# Patient Record
Sex: FEMALE | Race: WHITE | NOT HISPANIC OR LATINO | Employment: PART TIME | URBAN - METROPOLITAN AREA
[De-identification: names, ages, dates, MRNs, and addresses within clinical notes are randomized per-mention and may not be internally consistent; named-entity substitution may affect disease eponyms.]

---

## 2017-01-17 ENCOUNTER — ALLSCRIPTS OFFICE VISIT (OUTPATIENT)
Dept: OTHER | Facility: OTHER | Age: 42
End: 2017-01-17

## 2017-02-13 ENCOUNTER — ALLSCRIPTS OFFICE VISIT (OUTPATIENT)
Dept: OTHER | Facility: OTHER | Age: 42
End: 2017-02-13

## 2017-04-19 ENCOUNTER — ALLSCRIPTS OFFICE VISIT (OUTPATIENT)
Dept: OTHER | Facility: OTHER | Age: 42
End: 2017-04-19

## 2017-08-15 ENCOUNTER — ALLSCRIPTS OFFICE VISIT (OUTPATIENT)
Dept: OTHER | Facility: OTHER | Age: 42
End: 2017-08-15

## 2017-08-29 ENCOUNTER — ALLSCRIPTS OFFICE VISIT (OUTPATIENT)
Dept: OTHER | Facility: OTHER | Age: 42
End: 2017-08-29

## 2017-09-14 ENCOUNTER — TRANSCRIBE ORDERS (OUTPATIENT)
Dept: ADMINISTRATIVE | Facility: HOSPITAL | Age: 42
End: 2017-09-14

## 2017-09-14 DIAGNOSIS — N92.1 METRORRHAGIA: Primary | ICD-10-CM

## 2017-09-20 ENCOUNTER — HOSPITAL ENCOUNTER (OUTPATIENT)
Dept: RADIOLOGY | Facility: HOSPITAL | Age: 42
Discharge: HOME | End: 2017-09-20
Attending: OBSTETRICS & GYNECOLOGY
Payer: COMMERCIAL

## 2017-09-20 DIAGNOSIS — N92.1 METRORRHAGIA: ICD-10-CM

## 2017-09-20 PROCEDURE — 76856 US EXAM PELVIC COMPLETE: CPT

## 2017-09-20 PROCEDURE — 76830 TRANSVAGINAL US NON-OB: CPT

## 2017-09-28 ENCOUNTER — GENERIC CONVERSION - ENCOUNTER (OUTPATIENT)
Dept: OTHER | Facility: OTHER | Age: 42
End: 2017-09-28

## 2017-10-11 RX ORDER — OMEPRAZOLE 20 MG/1
20 CAPSULE, DELAYED RELEASE ORAL AS NEEDED
COMMUNITY
End: 2019-01-02 | Stop reason: SDUPTHER

## 2017-10-11 NOTE — PRE-PROCEDURE INSTRUCTIONS
Pre-Surgery Instructions:   Medication Instructions    MELOXICAM PO Instructed patient per Anesthesia Guidelines   omeprazole (PriLOSEC) 20 mg delayed release capsule Instructed patient per Anesthesia Guidelines  Pre op instructions reviewed with pt via phone  Instructed to take omeprazole (prn) a m of surgery   bunny Johns [phone]  My Surgical Experience    The following information was developed to assist you to prepare for your operation  What do I need to do before coming to the hospital?   Arrange for a responsible person to drive you to and from the hospital    Arrange care for your children at home  Children are not allowed in the recovery areas of the hospital   Plan to wear clothing that is easy to put on and take off  If you are having shoulder surgery, wear a shirt that buttons or zippers in the front  Bathing  o Shower the evening before and the morning of your surgery with an antibacterial soap  Please refer to the Pre Op Showering Instructions for Surgery Patients Sheet   o Remove nail polish and all body piercing jewelry  o Do not shave any body part for at least 24 hours before surgery-this includes face, arms, legs and upper body  Food  o Nothing to eat or drink after midnight the night before your surgery  This includes candy and chewing gum  o Exception: If your surgery is after 12:00pm (noon), you may have clear liquids such as 7-Up®, ginger ale, apple or cranberry juice, Jell-O®, water, or clear broth until 8:00 am  o Do not drink milk or juice with pulp on the morning before surgery  o Do not drink alcohol 24 hours before surgery  Medicine  o Follow instructions you received from your surgeon about which medicines you may take on the day of surgery  o If instructed to take medicine on the morning of surgery, take pills with just a small sip of water   Call your prescribing doctor for specific information on what to do if you take insulin    What should I bring to the hospital?    Bring:  Nonnie Senters or a walker, if you have them, for foot or knee surgery   A list of the daily medicines, vitamins, minerals, herbals and nutritional supplements you take  Include the dosages of medicines and the time you take them each day   Glasses, dentures or hearing aids   Minimal clothing; you will be wearing hospital sleepwear   Photo ID; required to verify your identity   If you have a Living Will or Power of , bring a copy of the documents   If you have an ostomy, bring an extra pouch and any supplies you use    Do not bring   Medicines or inhalers   Money, valuables or jewelry    What other information should I know about the day of surgery?  Notify your surgeons if you develop a cold, sore throat, cough, fever, rash or any other illness   Report to the Ambulatory Surgical/Same Day Surgery Unit   You will be instructed to stop at Registration only if you have not been pre-registered   Inform your  fi they do not stay that they will be asked by the staff to leave a phone number where they can be reached   Be available to be reached before surgery  In the event the operating room schedule changes, you may be asked to come in earlier or later than expected    *It is important to tell your doctor and others involved in your health care if you are taking or have been taking any non-prescription drugs, vitamins, minerals, herbals or other nutritional supplements   Any of these may interact with some food or medicines and cause a reaction

## 2017-10-17 ENCOUNTER — ANESTHESIA EVENT (OUTPATIENT)
Dept: PERIOP | Facility: AMBULARY SURGERY CENTER | Age: 42
End: 2017-10-17
Payer: COMMERCIAL

## 2017-10-17 NOTE — ANESTHESIA PREPROCEDURE EVALUATION
Review of Systems/Medical History  Patient summary reviewed  Chart reviewed  History of anesthetic complications PONV    Cardiovascular   Pulmonary  Smoker ex-smoker , ,        GI/Hepatic    GERD ,             Endo/Other     GYN       Hematology   Musculoskeletal    Comment: Chronic right foot pain      Neurology    Headaches,    Psychology   Anxiety,            Physical Exam    Airway    Mallampati score: II  TM Distance: <3 FB  Neck ROM: full     Dental       Cardiovascular  Rhythm: regular, Rate: normal,     Pulmonary  Breath sounds clear to auscultation,     Other Findings        Anesthesia Plan  ASA Score- 2       Anesthesia Type- general with ASA Monitors  Additional Monitors:   Airway Plan: LMA  Induction- intravenous  Informed Consent- Anesthetic plan and risks discussed with patient  I personally reviewed this patient with the CRNA  Discussed and agreed on the Anesthesia Plan with the CRNA  Shelton Hassan

## 2017-10-18 ENCOUNTER — ANESTHESIA (OUTPATIENT)
Dept: PERIOP | Facility: AMBULARY SURGERY CENTER | Age: 42
End: 2017-10-18
Payer: COMMERCIAL

## 2017-10-18 ENCOUNTER — HOSPITAL ENCOUNTER (OUTPATIENT)
Facility: AMBULARY SURGERY CENTER | Age: 42
Setting detail: OUTPATIENT SURGERY
Discharge: HOME | End: 2017-10-18
Attending: OBSTETRICS & GYNECOLOGY | Admitting: OBSTETRICS & GYNECOLOGY
Payer: COMMERCIAL

## 2017-10-18 VITALS
BODY MASS INDEX: 27.47 KG/M2 | SYSTOLIC BLOOD PRESSURE: 104 MMHG | TEMPERATURE: 97.4 F | DIASTOLIC BLOOD PRESSURE: 64 MMHG | HEIGHT: 67 IN | HEART RATE: 55 BPM | RESPIRATION RATE: 16 BRPM | OXYGEN SATURATION: 98 % | WEIGHT: 175 LBS

## 2017-10-18 DIAGNOSIS — N93.9 ABNORMAL UTERINE AND VAGINAL BLEEDING, UNSPECIFIED (CODE): ICD-10-CM

## 2017-10-18 PROCEDURE — 88305 TISSUE EXAM BY PATHOLOGIST: CPT | Performed by: OBSTETRICS & GYNECOLOGY

## 2017-10-18 RX ORDER — SODIUM CHLORIDE, SODIUM LACTATE, POTASSIUM CHLORIDE, CALCIUM CHLORIDE 600; 310; 30; 20 MG/100ML; MG/100ML; MG/100ML; MG/100ML
125 INJECTION, SOLUTION INTRAVENOUS CONTINUOUS
Status: DISCONTINUED | OUTPATIENT
Start: 2017-10-18 | End: 2017-10-18 | Stop reason: SDUPTHER

## 2017-10-18 RX ORDER — HYDROMORPHONE HCL 110MG/55ML
0.5 PATIENT CONTROLLED ANALGESIA SYRINGE INTRAVENOUS AS NEEDED
Status: DISCONTINUED | OUTPATIENT
Start: 2017-10-18 | End: 2017-10-18 | Stop reason: HOSPADM

## 2017-10-18 RX ORDER — MEPERIDINE HYDROCHLORIDE 25 MG/ML
12.5 INJECTION INTRAMUSCULAR; INTRAVENOUS; SUBCUTANEOUS
Status: DISCONTINUED | OUTPATIENT
Start: 2017-10-18 | End: 2017-10-18 | Stop reason: HOSPADM

## 2017-10-18 RX ORDER — ONDANSETRON 2 MG/ML
INJECTION INTRAMUSCULAR; INTRAVENOUS AS NEEDED
Status: DISCONTINUED | OUTPATIENT
Start: 2017-10-18 | End: 2017-10-18 | Stop reason: SURG

## 2017-10-18 RX ORDER — FENTANYL CITRATE 50 UG/ML
INJECTION, SOLUTION INTRAMUSCULAR; INTRAVENOUS AS NEEDED
Status: DISCONTINUED | OUTPATIENT
Start: 2017-10-18 | End: 2017-10-18 | Stop reason: SURG

## 2017-10-18 RX ORDER — SODIUM CHLORIDE, SODIUM LACTATE, POTASSIUM CHLORIDE, CALCIUM CHLORIDE 600; 310; 30; 20 MG/100ML; MG/100ML; MG/100ML; MG/100ML
125 INJECTION, SOLUTION INTRAVENOUS CONTINUOUS
Status: DISCONTINUED | OUTPATIENT
Start: 2017-10-18 | End: 2017-10-18 | Stop reason: HOSPADM

## 2017-10-18 RX ORDER — SODIUM CHLORIDE, SODIUM LACTATE, POTASSIUM CHLORIDE, CALCIUM CHLORIDE 600; 310; 30; 20 MG/100ML; MG/100ML; MG/100ML; MG/100ML
75 INJECTION, SOLUTION INTRAVENOUS CONTINUOUS
Status: DISCONTINUED | OUTPATIENT
Start: 2017-10-18 | End: 2017-10-18 | Stop reason: SDUPTHER

## 2017-10-18 RX ORDER — MIDAZOLAM HYDROCHLORIDE 1 MG/ML
INJECTION INTRAMUSCULAR; INTRAVENOUS AS NEEDED
Status: DISCONTINUED | OUTPATIENT
Start: 2017-10-18 | End: 2017-10-18 | Stop reason: SURG

## 2017-10-18 RX ORDER — ONDANSETRON 2 MG/ML
4 INJECTION INTRAMUSCULAR; INTRAVENOUS ONCE AS NEEDED
Status: DISCONTINUED | OUTPATIENT
Start: 2017-10-18 | End: 2017-10-18 | Stop reason: HOSPADM

## 2017-10-18 RX ORDER — PROPOFOL 10 MG/ML
INJECTION, EMULSION INTRAVENOUS AS NEEDED
Status: DISCONTINUED | OUTPATIENT
Start: 2017-10-18 | End: 2017-10-18 | Stop reason: SURG

## 2017-10-18 RX ORDER — FENTANYL CITRATE/PF 50 MCG/ML
50 SYRINGE (ML) INJECTION AS NEEDED
Status: DISCONTINUED | OUTPATIENT
Start: 2017-10-18 | End: 2017-10-18 | Stop reason: HOSPADM

## 2017-10-18 RX ORDER — MAGNESIUM HYDROXIDE 1200 MG/15ML
LIQUID ORAL AS NEEDED
Status: DISCONTINUED | OUTPATIENT
Start: 2017-10-18 | End: 2017-10-18 | Stop reason: HOSPADM

## 2017-10-18 RX ORDER — LIDOCAINE HYDROCHLORIDE 10 MG/ML
INJECTION, SOLUTION INFILTRATION; PERINEURAL AS NEEDED
Status: DISCONTINUED | OUTPATIENT
Start: 2017-10-18 | End: 2017-10-18 | Stop reason: SURG

## 2017-10-18 RX ORDER — SCOLOPAMINE TRANSDERMAL SYSTEM 1 MG/1
1 PATCH, EXTENDED RELEASE TRANSDERMAL ONCE
Status: DISCONTINUED | OUTPATIENT
Start: 2017-10-18 | End: 2017-10-18 | Stop reason: HOSPADM

## 2017-10-18 RX ORDER — PROMETHAZINE HYDROCHLORIDE 25 MG/ML
12.5 INJECTION, SOLUTION INTRAMUSCULAR; INTRAVENOUS ONCE AS NEEDED
Status: DISCONTINUED | OUTPATIENT
Start: 2017-10-18 | End: 2017-10-18 | Stop reason: HOSPADM

## 2017-10-18 RX ADMIN — DEXAMETHASONE SODIUM PHOSPHATE 5 MG: 10 INJECTION INTRAMUSCULAR; INTRAVENOUS at 09:38

## 2017-10-18 RX ADMIN — SODIUM CHLORIDE, SODIUM LACTATE, POTASSIUM CHLORIDE, AND CALCIUM CHLORIDE 125 ML/HR: .6; .31; .03; .02 INJECTION, SOLUTION INTRAVENOUS at 08:45

## 2017-10-18 RX ADMIN — MIDAZOLAM HYDROCHLORIDE 2 MG: 1 INJECTION, SOLUTION INTRAMUSCULAR; INTRAVENOUS at 09:22

## 2017-10-18 RX ADMIN — FENTANYL CITRATE 50 MCG: 50 INJECTION, SOLUTION INTRAMUSCULAR; INTRAVENOUS at 09:22

## 2017-10-18 RX ADMIN — SODIUM CHLORIDE, SODIUM LACTATE, POTASSIUM CHLORIDE, AND CALCIUM CHLORIDE: .6; .31; .03; .02 INJECTION, SOLUTION INTRAVENOUS at 09:20

## 2017-10-18 RX ADMIN — ONDANSETRON 4 MG: 2 INJECTION INTRAMUSCULAR; INTRAVENOUS at 09:38

## 2017-10-18 RX ADMIN — LIDOCAINE HYDROCHLORIDE 50 MG: 10 INJECTION, SOLUTION INFILTRATION; PERINEURAL at 09:25

## 2017-10-18 RX ADMIN — PROPOFOL 200 MG: 10 INJECTION, EMULSION INTRAVENOUS at 09:25

## 2017-10-18 NOTE — ANESTHESIA POSTPROCEDURE EVALUATION
Post-Op Assessment Note      CV Status:  Stable    Mental Status:  Awake    Hydration Status:  Stable    PONV Controlled:  None    Airway Patency:  Patent    Post Op Vitals Reviewed: Yes          Staff: Anesthesiologist           /67 (10/18/17 1001)    Temp      Pulse (!) 52 (10/18/17 1001)   Resp      SpO2 100 % (10/18/17 1001)

## 2017-10-18 NOTE — OP NOTE
OPERATIVE REPORT  PATIENT NAME: Rocio Johnson    :  1975  MRN: [id number]  Pt Location: [facility] OR ROOM 01    SURGERY DATE: 10/18/2017    Surgeon(s) and Role:     * Norris Ahumada, MD - Primary    Preop Diagnosis:  Abnormal uterine and vaginal bleeding, unspecified (CODE) [N93 9]    Post-Op Diagnosis Codes:     * Abnormal uterine and vaginal bleeding, unspecified (CODE) [N93 9]    Procedure(s) (LRB):  DILATATION AND CURETTAGE (D&C) WITH HYSTEROSCOPY, POSS  POLYPECTOMY (N/A)    Specimen(s):  ID Type Source Tests Collected by Time Destination   1 : Endo cervical curretings Tissue Endocervical TISSUE EXAM Norris Ahumada, MD 10/18/2017 8450    2 : Endometrial curretings Tissue Endometrium TISSUE EXAM Norris Ahumada, MD 10/18/2017 9444        Estimated Blood Loss:   Minimal    Drains:       Anesthesia Type:   General    Operative Indications:  Abnormal uterine and vaginal bleeding, unspecified (CODE) [N93 9]  Endometrial hyperplasia on recent ultrasound; possible endometrial polyp    Operative Findings:  Normal-appearing endometrial cavity; no endometrial polyp seen    Complications:   None    Procedure and Technique:  The patient was brought into the operating room placed under general anesthesia in lithotomy position prepped and draped in usual manner  A weighted speculum was placed in the posterior fornix of the vagina, straight speculum was used to raise the anterior aspect of the vagina revealing the cervix  This was grasped on its anterior lip and brought into longitudinal axis with the vagina  The cervix was slightly dilated using West dilators after rigid diagnostic hysteroscope was placed through the cervical canal into the cavity of the uterus  The cavity was well distended with glycine and there was good visualization of the endometrial cavity    There were no submucosal polyps or fibroids noted and the cavity looked completely normal   T Once the hysteroscope was removed, endocervical curettings were obtained after which a endometrial curette marked to the depth of the endometrial cavity as noted with preoperative ultrasound was passed into the cavity and a thorough but gentle curettage of the endometrium ensued  There was no active bleeding and no damage or puncture of the uterus during the case    At the end of the procedure bleeding was normal   The patient was clean the anesthesia was reversed and the patient was transferred to PACU in good condition   I was present for the entire procedure    Patient Disposition:  PACU     SIGNATURE: Lakeshia Azul MD  DATE: October 18, 2017  TIME: 9:45 AM

## 2017-10-18 NOTE — DISCHARGE INSTRUCTIONS
DISCHARGE INSTRUCTIONS  DR GARCIA    · No driving or operating any heavy equipment for at least 24 hours  · Resume normal diet:  Start light including liquids, toast, jello, soup etc   · Resume regular medications unless otherwise directed by you physician    If you have an abdominal incision:  · You may shower in the morning following surgery  · Call Dr Summer Varner for any redness, foul smelling discharge, fever of over 100 4 or any signs of infection      · Nothing in vagina for 5-7 days  No sex, douching or tampons  · Call physician for excessive bleeding, soaking though pads, or passing large clots     · Make an appointment to see MD in 2 weeks    Call Dr Summer Varner at [phone] for any problems or questions

## 2017-12-22 ENCOUNTER — ALLSCRIPTS OFFICE VISIT (OUTPATIENT)
Dept: OTHER | Facility: OTHER | Age: 42
End: 2017-12-22

## 2017-12-23 NOTE — PROGRESS NOTES
Assessment   1  Acute bacterial sinusitis (461 9) (J01 90,B96 89)    Plan   Acute bacterial sinusitis    · Amoxicillin-Pot Clavulanate 875-125 MG Oral Tablet; TAKE 1 TABLET EVERY 12    HOURS DAILY   · Promethazine-DM 6 25-15 MG/5ML Oral Syrup; TAKE 5 - 10 ML BY MOUTH EVERY    6 HOURS AS NEEDED    Discussion/Summary      Rto prn  Possible side effects of new medications were reviewed with the patient/guardian today  The treatment plan was reviewed with the patient/guardian  The patient/guardian understands and agrees with the treatment plan      Chief Complaint   pt  c/o sinus pain, swollen glands, left ear pain  ck/lpn      History of Present Illness   HPI: 43 y o f seen for sinus pressure, cough with colored mucus in am, cannot sleep b/o cough, no SOB, + L earache, no therapy tried      Review of Systems        Constitutional: No fever, no chills, feels well, no tiredness, no recent weight gain or loss  ENT: sore throat,-- nasal discharge-- and-- hoarseness, but-- as noted in HPI  Respiratory: cough, but-- no shortness of breath,-- no wheezing-- and-- no shortness of breath during exertion  Active Problems   1  Common migraine without aura (346 10) (G43 009)   2  Generalized anxiety disorder (300 02) (F41 1)   3  Screening for colorectal cancer (V76 51) (Z12 11,Z12 12)    Family History   Family History    1  Family history of Arthritis (V17 7)   2  Family history of Colon Cancer (V16 0)   3  Family history of Lung Cancer (V16 1)   4  Family history of Uterine Cancer (V16 49)    Social History    · Alcohol Use (History)   · Daily Cola Consumption (___ Cans/Day)   · Daily Tea Consumption (___ Cups/Day)   · Former smoker (A87 13) (F90 136)    Surgical History   1  History of Oral Surgery Tooth Extraction   2  History of Pilonidal Cyst Resection - Simple    Current Meds    1  FLUoxetine HCl - 20 MG Oral Capsule; TAKE 1 CAPSULE DAILY;      Therapy: 38DEF1479 to (Evaluate:11Nov2016)  Requested for: 79VIF5810; Last     Rx:07Miy2784 Ordered   2  Meloxicam 15 MG Oral Tablet; one tab po qd with food; Therapy: 17KET5854 to (Last Gissel Rodriguez)  Requested for: 04MNT2323 Ordered   3  Omeprazole 40 MG Oral Capsule Delayed Release; take 1 capsule by mouth once daily; Therapy: 99CZS6128 to (Evaluate:64Wfu3022)  Requested for: 00ZVO8358; Last     Rx:40Acz9163 Ordered   4  SUMAtriptan Succinate 100 MG Oral Tablet; take one for migraine; repeat in 2 hours if     needed; not to take more than 2 in 24 hours; Therapy: 94AMF5887 to (Last Rx:92Lof6144)  Requested for: 36Shw5883 Ordered     The medication list was reviewed and updated today  Allergies   1  No Known Drug Allergies    Vitals    Recorded: 22Dec2017 01:30PM   Temperature 98 8 F, Tympanic   Heart Rate 72, L Radial   Pulse Quality Normal, L Radial   Respiration Quality Normal   Respiration 14   Systolic 458, LUE, Sitting   Diastolic 70, LUE, Sitting   Height 5 ft 5 in   Weight 175 lb    BMI Calculated 29 12   BSA Calculated 1 87     Physical Exam        Constitutional      General appearance: No acute distress, well appearing and well nourished  Ears, Nose, Mouth, and Throat      Otoscopic examination: Tympanic membranes translucent with normal light reflex  Canals patent without erythema  Nasal mucosa, septum, and turbinates: Abnormal   The bilateral nasal mucosa was boggy-- and-- edematous  -- b/l maxillary sinuses tenderness  Oropharynx: Normal with no erythema, edema, exudate or lesions  Pulmonary      Respiratory effort: No increased work of breathing or signs of respiratory distress  Auscultation of lungs: Clear to auscultation  Results/Data   PHQ-2 Adult Depression Screening 22Dec2017 01:39PM User, s      Test Name Result Flag Reference   PHQ-2 Adult Depression Score 0     Over the last two weeks, how often have you been bothered by any of the following problems?      Little interest or pleasure in doing things: Not at all - 0     Feeling down, depressed, or hopeless: Not at all - 0   PHQ-2 Adult Depression Screening Negative          Signatures    Electronically signed by : GIAN Connors ; Dec 22 2017  1:49PM EST                       (Author)

## 2018-01-09 ENCOUNTER — ALLSCRIPTS OFFICE VISIT (OUTPATIENT)
Dept: OTHER | Facility: OTHER | Age: 43
End: 2018-01-09

## 2018-01-09 DIAGNOSIS — Z12.31 ENCOUNTER FOR SCREENING MAMMOGRAM FOR MALIGNANT NEOPLASM OF BREAST: ICD-10-CM

## 2018-01-10 NOTE — PROGRESS NOTES
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)   2  Common migraine without aura (346 10) (G43 009)   3  Encounter for PPD test (V74 1) (Z11 1)   4  Generalized anxiety disorder (300 02) (F41 1)   5  Ingrowing nail (703 0) (L60 0)   6  Left maxillary sinusitis (473 0) (J32 0)   7  Paronychia of toe (681 11) (L03 039)   8  Urinary frequency (788 41) (R35 0)   9  Vaginal candidiasis (112 1) (B37 3)    Current Meds   1  Amoxicillin-Pot Clavulanate 875-125 MG Oral Tablet; take one tablet twice daily for 10   days; Therapy: 73GPC8814 to (Evaluate:93Idp1835)  Requested for: 56CLA2000; Last   Rx:30Nov2016 Ordered   2  Fluconazole 150 MG Oral Tablet; TAKE 1 TABLET NOW AND AGAIN IN 3 DAYS; Therapy: 12XXY0066 to (Last Rx:30Nov2016)  Requested for: 85HPD7073 Ordered   3  FLUoxetine HCl - 20 MG Oral Capsule; TAKE 1 CAPSULE DAILY; Therapy: 62MYA3614 to (Evaluate:11Nov2016)  Requested for: 79BTM3911; Last   Rx:65Fmr8483 Ordered   4  Fluticasone Propionate 50 MCG/ACT Nasal Suspension; USE TWO SPRAYS IN EACH   NOSTRIL ONCE DAILY; Therapy: 72NNF6045 to (Last Rx:30Nov2016)  Requested for: 20DWC7264 Ordered   5  SUMAtriptan Succinate 100 MG Oral Tablet; take one for migraine; repeat in 2 hours if   needed; not to take more than 2 in 24 hours; Therapy: 31VAH5846 to (Last Rx:29Mmy2922)  Requested for: 68Hpz7246 Ordered    Allergies    1   No Known Drug Allergies    Plan  Encounter for PPD test    · PPD    Future Appointments    Date/Time Provider Specialty Site   12/23/2016 10:30 AM [facility] FP, Nurse Schedule  [facility]     Signatures   Electronically signed by : GIAN Zhao ; Dec 21 2016 11:58AM EST                       (Author)

## 2018-01-11 ENCOUNTER — GENERIC CONVERSION - ENCOUNTER (OUTPATIENT)
Dept: OTHER | Facility: OTHER | Age: 43
End: 2018-01-11

## 2018-01-11 LAB
A/G RATIO (HISTORICAL): 1.7 (ref 1.2–2.2)
ALBUMIN SERPL BCP-MCNC: 4.3 G/DL (ref 3.5–5.5)
ALP SERPL-CCNC: 86 IU/L (ref 39–117)
ALT SERPL W P-5'-P-CCNC: 8 IU/L (ref 0–32)
AST SERPL W P-5'-P-CCNC: 24 IU/L (ref 0–40)
BILIRUB SERPL-MCNC: 0.4 MG/DL (ref 0–1.2)
BUN SERPL-MCNC: 11 MG/DL (ref 6–24)
BUN/CREA RATIO (HISTORICAL): 13 (ref 9–23)
CALCIUM SERPL-MCNC: 9.1 MG/DL (ref 8.7–10.2)
CHLORIDE SERPL-SCNC: 100 MMOL/L (ref 96–106)
CHOLEST SERPL-MCNC: 172 MG/DL (ref 100–199)
CHOLEST/HDLC SERPL: 2.2 RATIO UNITS (ref 0–4.4)
CO2 SERPL-SCNC: 28 MMOL/L (ref 18–29)
CREAT SERPL-MCNC: 0.84 MG/DL (ref 0.57–1)
EGFR AFRICAN AMERICAN (HISTORICAL): 99 ML/MIN/1.73
EGFR-AMERICAN CALC (HISTORICAL): 86 ML/MIN/1.73
GLUCOSE SERPL-MCNC: 79 MG/DL (ref 65–99)
HDLC SERPL-MCNC: 79 MG/DL
INTERPRETATION (HISTORICAL): NORMAL
LDLC SERPL CALC-MCNC: 80 MG/DL (ref 0–99)
POTASSIUM SERPL-SCNC: 4 MMOL/L (ref 3.5–5.2)
SODIUM SERPL-SCNC: 142 MMOL/L (ref 134–144)
TOT. GLOBULIN, SERUM (HISTORICAL): 2.6 G/DL (ref 1.5–4.5)
TOTAL PROTEIN (HISTORICAL): 6.9 G/DL (ref 6–8.5)
TRIGL SERPL-MCNC: 66 MG/DL (ref 0–149)
VLDLC SERPL CALC-MCNC: 13 MG/DL (ref 5–40)

## 2018-01-12 ENCOUNTER — GENERIC CONVERSION - ENCOUNTER (OUTPATIENT)
Dept: OTHER | Facility: OTHER | Age: 43
End: 2018-01-12

## 2018-01-12 ENCOUNTER — ALLSCRIPTS OFFICE VISIT (OUTPATIENT)
Dept: OTHER | Facility: OTHER | Age: 43
End: 2018-01-12

## 2018-01-12 VITALS
WEIGHT: 175 LBS | SYSTOLIC BLOOD PRESSURE: 120 MMHG | HEART RATE: 80 BPM | DIASTOLIC BLOOD PRESSURE: 70 MMHG | BODY MASS INDEX: 29.16 KG/M2 | HEIGHT: 65 IN | TEMPERATURE: 97.5 F | RESPIRATION RATE: 18 BRPM

## 2018-01-12 LAB
DEPRECATED RDW RBC AUTO: 13.6 % (ref 12.3–15.4)
HCT VFR BLD AUTO: 40.2 % (ref 34–46.6)
HGB BLD-MCNC: 13 G/DL (ref 11.1–15.9)
MCH RBC QN AUTO: 28.6 PG (ref 26.6–33)
MCHC RBC AUTO-ENTMCNC: 32.3 G/DL (ref 31.5–35.7)
MCV RBC AUTO: 89 FL (ref 79–97)
PLATELET # BLD AUTO: 331 X10E3/UL (ref 150–379)
RBC (HISTORICAL): 4.54 X10E6/UL (ref 3.77–5.28)
TSH SERPL DL<=0.05 MIU/L-ACNC: 0.46 UIU/ML (ref 0.45–4.5)
WBC # BLD AUTO: 5.3 X10E3/UL (ref 3.4–10.8)

## 2018-01-13 VITALS
BODY MASS INDEX: 28.82 KG/M2 | SYSTOLIC BLOOD PRESSURE: 124 MMHG | RESPIRATION RATE: 18 BRPM | DIASTOLIC BLOOD PRESSURE: 70 MMHG | HEIGHT: 65 IN | HEART RATE: 80 BPM | WEIGHT: 173 LBS | TEMPERATURE: 98 F

## 2018-01-13 VITALS
WEIGHT: 175 LBS | BODY MASS INDEX: 29.16 KG/M2 | HEART RATE: 70 BPM | DIASTOLIC BLOOD PRESSURE: 73 MMHG | RESPIRATION RATE: 16 BRPM | SYSTOLIC BLOOD PRESSURE: 110 MMHG | HEIGHT: 65 IN

## 2018-01-14 VITALS
DIASTOLIC BLOOD PRESSURE: 65 MMHG | HEART RATE: 65 BPM | WEIGHT: 175 LBS | RESPIRATION RATE: 16 BRPM | SYSTOLIC BLOOD PRESSURE: 104 MMHG | HEIGHT: 65 IN | BODY MASS INDEX: 29.16 KG/M2

## 2018-01-14 VITALS
DIASTOLIC BLOOD PRESSURE: 80 MMHG | TEMPERATURE: 98.6 F | HEART RATE: 84 BPM | BODY MASS INDEX: 29.16 KG/M2 | SYSTOLIC BLOOD PRESSURE: 100 MMHG | WEIGHT: 175 LBS | RESPIRATION RATE: 18 BRPM | HEIGHT: 65 IN

## 2018-01-16 NOTE — PROGRESS NOTES
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)   2  Common migraine without aura (346 10) (G43 009)   3  Encounter for PPD test (V74 1) (Z11 1)   4  Generalized anxiety disorder (300 02) (F41 1)   5  Ingrowing nail (703 0) (L60 0)   6  Left maxillary sinusitis (473 0) (J32 0)   7  Paronychia of toe (681 11) (L03 039)   8  Urinary frequency (788 41) (R35 0)   9  Vaginal candidiasis (112 1) (B37 3)    Current Meds   1  Amoxicillin-Pot Clavulanate 875-125 MG Oral Tablet; take one tablet twice daily for 10   days; Therapy: 88ITS4341 to (Evaluate:13Dvp1503)  Requested for: 05NOD2873; Last   Rx:30Nov2016 Ordered   2  Fluconazole 150 MG Oral Tablet; TAKE 1 TABLET NOW AND AGAIN IN 3 DAYS; Therapy: 65WPX7224 to (Last Rx:30Nov2016)  Requested for: 25QUO9583 Ordered   3  FLUoxetine HCl - 20 MG Oral Capsule; TAKE 1 CAPSULE DAILY; Therapy: 86FMU4824 to (Evaluate:11Nov2016)  Requested for: 07MNF7878; Last   Rx:63Ubx4513 Ordered   4  Fluticasone Propionate 50 MCG/ACT Nasal Suspension; USE TWO SPRAYS IN EACH   NOSTRIL ONCE DAILY; Therapy: 44RPS8114 to (Last Rx:30Nov2016)  Requested for: 81RNW7873 Ordered   5  SUMAtriptan Succinate 100 MG Oral Tablet; take one for migraine; repeat in 2 hours if   needed; not to take more than 2 in 24 hours; Therapy: 25CAS2206 to (Last Rx:41Ego4003)  Requested for: 86Bmi2413 Ordered    Allergies    1   No Known Drug Allergies    Plan  Encounter for PPD test    · PPD    Signatures   Electronically signed by : GIAN Jarvis ; Dec 23 2016 12:11PM EST                       (Author)

## 2018-01-22 VITALS
BODY MASS INDEX: 29.16 KG/M2 | DIASTOLIC BLOOD PRESSURE: 70 MMHG | HEIGHT: 65 IN | SYSTOLIC BLOOD PRESSURE: 110 MMHG | HEART RATE: 72 BPM | TEMPERATURE: 98.8 F | RESPIRATION RATE: 14 BRPM | WEIGHT: 175 LBS

## 2018-01-22 VITALS
WEIGHT: 175 LBS | SYSTOLIC BLOOD PRESSURE: 98 MMHG | RESPIRATION RATE: 16 BRPM | DIASTOLIC BLOOD PRESSURE: 56 MMHG | BODY MASS INDEX: 29.16 KG/M2 | HEIGHT: 65 IN | HEART RATE: 68 BPM

## 2018-01-23 VITALS
RESPIRATION RATE: 16 BRPM | HEIGHT: 65 IN | SYSTOLIC BLOOD PRESSURE: 100 MMHG | TEMPERATURE: 97.7 F | DIASTOLIC BLOOD PRESSURE: 60 MMHG | HEART RATE: 76 BPM | BODY MASS INDEX: 29.49 KG/M2 | WEIGHT: 177 LBS

## 2018-01-23 NOTE — PROGRESS NOTES
Assessment    1  Encounter for preventive health examination (V70 0) (Z00 00)   2  Acute bacterial sinusitis (461 9) (J01 90,B96 89)   3  Cough (786 2) (R05)   4  Encounter for screening mammogram for breast cancer (V76 12) (Z12 31)   5  Encounter for PPD test (V74 1) (Z11 1)   6  Encounter for screening for diabetes mellitus (V77 1) (Z13 1)   7  History of dysfunctional uterine bleeding (V13 29) (Z87 42)   8  Thyroid disorder screen (V77 0) (Z13 29)   9  Need for lipid screening (V77 91) (Z13 220)   10  BMI 29 0-29 9,adult (V85 25) (Z68 29)    Plan  Acute bacterial sinusitis    · Fluticasone Propionate 50 MCG/ACT Nasal Suspension; Two sprays in each nostril  once daily   · Moxifloxacin HCl - 400 MG Oral Tablet; take one tablet daily   · Apply warm moist compresses to the affected area 3 times a day for 5 minutes ;  Status:Complete;   Done: 16LYD3071   · Drink at least 6 glasses of water or juice a day ; Status:Complete;   Done: 61ULE3900   · How to use a nasal spray ; Status:Complete;   Done: 26ULZ5681   · Taking a hot steamy shower may help your condition ; Status:Complete;   Done:  60SWF2826   · Call [phone] if: The symptoms are not better in 7 days ; Status:Complete;   Done:  60LTB3453   · Call [phone] if: The symptoms come back after the medications are finished ;  Status:Complete;   Done: 92KJR5059  Acute bacterial sinusitis, Common migraine without aura, Cough, Health Maintenance    · (1) CBC/ PLT (NO DIFF); Status:Active; Requested [id number];    · (1) COMPREHENSIVE METABOLIC PANEL; Status:Active;  Requested YXY:[id number];   Common migraine without aura    · SUMAtriptan Succinate 100 MG Oral Tablet (Imitrex); take one for migraine;  repeat in 2 hours if needed; not to take more than 2 in 24  hours  Cough    · Benzonatate 200 MG Oral Capsule; TAKE 1 CAPSULE 3 TIMES DAILY AS  NEEDED   · Promethazine-Codeine 6 25-10 MG/5ML Oral Syrup; TAKE 5 ML BY MOUTH AT  BEDTIME AS NEEDED  Encounter for PPD test    · PPD; INJECT 0 1  ML Intradermal; To Be Done: 82BLZ7558  Encounter for screening mammogram for breast cancer    · * MAMMO SCREENING BILATERAL W CAD; Status:Hold For - Scheduling; Requested  for:09Jan2018;   Generalized anxiety disorder, Thyroid disorder screen    · (1) TSH WITH FT4 REFLEX; Status:Active; Requested [id number]; Health Maintenance    · Follow-up visit in 1 year Evaluation and Treatment  Follow-up  Status: Hold For -  Scheduling  Requested for: 85MQX8373   · Begin or continue regular aerobic exercise  Gradually work up to at least 3 sessions of 30  minutes of exercise a week ; Status:Complete;   Done: 78ZTF3018   · Drink plenty of fluids ; Status:Complete;   Done: 87TSX5379   · Eat a low fat and low cholesterol diet ; Status:Complete;   Done: 20YGY3347   · There are ways to decrease your stress and improve your sense of well-being  We  encourage you to keep active and exercise regularly  Make time to take care of yourself  and participate in activities that you enjoy  Stay connected to friends and family that can  support and comfort you  If at any time you have thoughts of harming yourself or  someone else, contact us immediately ; Status:Active; Requested [id number]:[id number];    · Call [phone] if: You have any warning signs of skin cancer ; Status:Complete;    Done: 21QQT2428  Health Maintenance, Need for lipid screening    · (1) LIPID PANEL, FASTING; Status:Active; Requested [id number]:[id number]; Discussion/Summary  health maintenance visit Currently, she eats an adequate diet and has an inadequate exercise regimen  the risks and benefits of cervical cancer screening were discussed cervical cancer screening is managed by Dr Jasmeet Gerard Breast cancer screening: the risks and benefits of breast cancer screening were discussed, monthly self breast exam was advised and mammogram has been ordered  Screening lab work includes hemoglobin, glucose, lipid profile and thyroid function testing  The risks and benefits of immunizations were discussed and patient declines immunizations  Advice and education were given regarding nutrition and weight bearing exercise  Patient discussion: discussed with the patient  The patient was counseled regarding instructions for management, risk factor reductions, prognosis, impressions, risks and benefits of treatment options, importance of compliance with treatment  Possible side effects of new medications were reviewed with the patient/guardian today  The treatment plan was reviewed with the patient/guardian  The patient/guardian understands and agrees with the treatment plan      Chief Complaint  pt presents for annual PE  paps w/ Dr Sofiya Ng  Pt needs refills today  pt has regular eye exams w/ [facility]  rh/cma      History of Present Illness  HM, Adult Female: The patient is being seen for a health maintenance evaluation  The last health maintenance visit was unknown  General Health: The patient's health since the last visit is described as fair  She has regular dental visits  She denies vision problems  She denies hearing loss  Immunizations status: not up to date  Lifestyle:  She consumes a diverse and healthy diet  She does not exercise regularly  She does not use tobacco  She denies alcohol use  Reproductive health: the patient is perimenopausal   she reports abnormal menses  see hpi  Screening: Cervical cancer screening includes uncertain timing of her last pap smear  Breast cancer screening includes uncertain timing of her last mammogram  She hasn't been previously screened for colorectal cancer  Metabolic screening includes uncertain timing of her last lipid profile, uncertain timing of her last glucose screening and uncertain timing of her last thyroid function test      Cardiovascular risk factors: stress and obesity, but no diabetes  General health risks: no abnormal cervical cytology     Safety elements used: seat belt and safe driving habits  Risk assessments performed include depression symptoms  Risk findings: no depression symptoms  HPI: Sees Dr Ottoniel Augustin for GYN   s/p D & C with uterine polyp removal for DUB 10/18/17    Sinusitis (Brief): The patient is being seen for an initial evaluation of sinusitis  The sinusitis involves the maxillary sinuses  The sinusitis is classified as acute recurrent  The patient is currently experiencing symptoms  facial pressure headache nasal congestion purulent rhinorrhea postnasal drainage The facial pain is located in the retro-orbital area bilaterally and maxillary teeth bilaterally  Nasal discharge is described as yellow and brown  Onset was gradual 2 week(s) ago and did not get better from 12/22 ov  The symptoms occur constantly  She describes this as moderate in severity and worsening  Exacerbating factors:  lying down  No relieving factors are noted  Associated symptoms:  fever, ear pain and cough  Current treatment includes increased fluid intake and saline nasal drops  By report, there is poor symptom control  Pertinent medical history:  1 week postpartum  No risk factors have been identified  Exposure history:  upper respiratory tract infection  Review of Systems    Constitutional: as noted in HPI  Eyes: No complaints of eye pain, no red eyes, no eyesight problems, no discharge, no dry eyes, no itching of eyes  ENT: as noted in HPI  Cardiovascular: No complaints of slow heart rate, no fast heart rate, no chest pain, no palpitations, no leg claudication, no lower extremity edema  Respiratory: cough, but no shortness of breath and no wheezing  Gastrointestinal: No complaints of abdominal pain, no constipation, no nausea or vomiting, no diarrhea, no bloody stools  Genitourinary: No complaints of dysuria, no incontinence, no pelvic pain, no dysmenorrhea, no vaginal discharge or bleeding     Musculoskeletal: No complaints of arthralgias, no myalgias, no joint swelling or stiffness, no limb pain or swelling  Integumentary: no rashes  Neurological: headache, but no dizziness  Psychiatric: Not suicidal, no sleep disturbance, no anxiety or depression, no change in personality, no emotional problems  Active Problems    1  Acute bacterial sinusitis (461 9) (J01 90,B96 89)   2  Common migraine without aura (346 10) (G43 009)   3  Generalized anxiety disorder (300 02) (F41 1)   4   Screening for colorectal cancer (V76 51) (Z12 11,Z12 12)    Past Medical History    · History of Abdominal pain, LLQ (left lower quadrant) (789 04) (R10 32)   · History of Acquired ankle/foot deformity (736 70) (M21 969)   · History of Acute bacterial bronchitis (466 0,041 9) (J20 8,B96 89)   · History of Acute Bronchitis With Bronchospasm (466 0)   · History of Acute upper respiratory infection (465 9) (J06 9)   · History of Dermatomycosis (111 9) (B36 9)   · History of acute bacterial sinusitis (V12 69) (Z87 09)   · History of acute bacterial sinusitis (V12 69) (Z87 09)   · History of acute sinusitis (V12 69) (Z87 09)   · History of dysfunctional uterine bleeding (V13 29) (Z87 42)   · History of ingrown nail (V13 3) (Z87 2)   · History of ingrown nail (V13 3) (Z87 2)   · History of pharyngitis (V12 69) (Z87 09)   · History of trichomoniasis (V12 09) (Z86 19)   · History of urinary frequency (V13 09) (Z87 898)   · History of vaginitis (V13 29) (Z87 42)   · History of viral gastroenteritis (V12 09) (Z86 19)   · History of Ingrown nail (703 0) (L60 0)   · History of Joint pain, knee (719 46) (M25 569)   · History of Left maxillary sinusitis (473 0) (J32 0)   · History of Limb pain (729 5) (M79 609)   · History of Lower back pain (724 2) (M54 5)   · History of Mass of knee (719 66) (M25 869)   · History of Paronychia (681 9)   · History of Paronychia of toe (681 11) (L03 039)   · History of Paronychia of toe of right foot (681 11) (L03 031)   · History of Paronychia of toe, unspecified laterality (681 11) (L03 039)   · History of Patellofemoral Dysfunction (736 6)   · History of Patellofemoral stress syndrome, unspecified laterality (719 46) (M22 2X9)   · History of Pes planus, congenital (754 61) (Q66 50)   · History of Plantar fibromatosis (728 71) (M72 2)   · History of Polycystic ovarian syndrome (256 4) (E28 2)   · History of Right foot pain (729 5) (M79 671)   · History of Right foot pain (729 5) (M79 671)   · History of Symptoms involving urinary system (788 99) (R39 9)   · History of Twin pregnancy (651 00,V91 00) (O30 009)   · History of Urinary frequency (788 41) (R35 0)   · History of Urinary Tract Infection (V13 02)   · History of UTI (urinary tract infection) (599 0) (N39 0)   · History of Vaginal candidiasis (112 1) (B37 3)   · History of Varicose Veins Of Lower Extremities (454 9)   · History of Vertigo (780 4) (R42)   · History of Wrist Sprain (842 00)   · History of Yeast infection (112 9) (B37 9)    The active problems and past medical history were reviewed and updated today  Surgical History    · History of Oral Surgery Tooth Extraction   · History of Pilonidal Cyst Resection - Simple    The surgical history was reviewed and updated today  Family History  Family History    · Family history of Arthritis (V17 7)   · Family history of Colon Cancer (V16 0)   · Family history of Lung Cancer (V16 1)   · Family history of Uterine Cancer (V16 49)    The family history was reviewed and updated today  Social History    · Alcohol Use (History)   · Daily Cola Consumption (___ Cans/Day)   · Daily Tea Consumption (___ Cups/Day)   · Former smoker (F83 02) (S91 709)  The social history was reviewed and updated today  Current Meds   1  Omeprazole 40 MG Oral Capsule Delayed Release; take 1 capsule by mouth once daily; Therapy: 25KCP1254 to (Evaluate:42Ibl1191)  Requested for: 43Pgc9261; Last   Rx:92Ebm6546 Ordered   2   SUMAtriptan Succinate 100 MG Oral Tablet; take one for migraine; repeat in 2 hours if   needed; not to take more than 2 in 24 hours; Therapy: 24HCI5959 to (Last Rx:19Apr2017)  Requested for: 19Apr2017 Ordered    Allergies    1  No Known Drug Allergies    Vitals   Recorded: 59DWC6170 01:09PM   Temperature 97 7 F, Temporal   Heart Rate 76, L Radial   Pulse Quality Normal, L Radial   Respiration Quality Normal   Respiration 16   Systolic 670, LUE, Sitting   Diastolic 60, LUE, Sitting   Height 5 ft 5 in   Weight 177 lb    BMI Calculated 29 45   BSA Calculated 1 88     Physical Exam    Constitutional   General appearance: Abnormal   acutely ill and well hydrated  Eyes   Conjunctiva and lids: Abnormal   injected  Pupils and irises: Equal, round and reactive to light  Ears, Nose, Mouth, and Throat   External inspection of ears and nose: Normal     Otoscopic examination: Abnormal   TMs dull  Nasal mucosa, septum, and turbinates: Abnormal   There was a purulent discharge from both nares  The bilateral nasal mucosa was edematous and red  Oropharynx: Abnormal   The posterior pharynx was erythematous  Pulmonary   Respiratory effort: No increased work of breathing or signs of respiratory distress  Auscultation of lungs: Clear to auscultation  Cardiovascular   Auscultation of heart: Normal rate and rhythm, normal S1 and S2, without murmurs  Examination of extremities for edema and/or varicosities: Normal     Lymphatic   Palpation of lymph nodes in neck: Abnormal   bilateral anterior cervical node enlargement  Musculoskeletal   Gait and station: Normal     Skin   Skin and subcutaneous tissue: Normal without rashes or lesions  Psychiatric   Mood and affect: Normal       Head and Face: Maxillary sinuses: tender to palpation on the left        Future Appointments    Date/Time Provider Specialty Site   01/12/2018 11:30 AM [facility] FP, Nurse Schedule  [facility]     Signatures   Electronically signed by : MICHAEL Cummings; Jan 9 2018 1:45PM EST                       (Author)    Electronically signed by : GIAN Ventura ; Jan 9 2018  1:45PM EST                       (Author)

## 2018-02-06 ENCOUNTER — OFFICE VISIT (OUTPATIENT)
Dept: FAMILY MEDICINE CLINIC | Facility: CLINIC | Age: 43
End: 2018-02-06
Payer: COMMERCIAL

## 2018-02-06 VITALS
HEART RATE: 78 BPM | HEIGHT: 65 IN | SYSTOLIC BLOOD PRESSURE: 92 MMHG | BODY MASS INDEX: 29.49 KG/M2 | DIASTOLIC BLOOD PRESSURE: 60 MMHG | TEMPERATURE: 97.1 F | RESPIRATION RATE: 12 BRPM | WEIGHT: 177 LBS

## 2018-02-06 DIAGNOSIS — M25.50 ARTHRALGIA, UNSPECIFIED JOINT: Primary | ICD-10-CM

## 2018-02-06 PROCEDURE — 99214 OFFICE O/P EST MOD 30 MIN: CPT | Performed by: FAMILY MEDICINE

## 2018-02-06 NOTE — PROGRESS NOTES
Chief Complaint   Patient presents with    Joint Swelling     pt  c/o joint pain x2 years on and off        Patient ID: Scar Interiano is a 43 y o  female  HPI    Pt is seeing for intermittent multiple joints pain started 1 y ago -  Worsening last wk -  Was taking NSAIDs with help -  Symptoms resolved by now, no prior w/u     The following portions of the patient's history were reviewed and updated as appropriate: allergies, current medications, past family history, past medical history, past social history, past surgical history and problem list     Review of Systems   Constitutional: Negative  Respiratory: Negative  Cardiovascular: Negative  Gastrointestinal: Negative  Genitourinary: Negative  Musculoskeletal: Negative  Skin: Negative  Neurological: Negative  Current Outpatient Prescriptions   Medication Sig Dispense Refill    omeprazole (PriLOSEC) 20 mg delayed release capsule Take 20 mg by mouth as needed       No current facility-administered medications for this visit  Objective:    BP 92/60 (BP Location: Right arm, Patient Position: Sitting, Cuff Size: Adult)   Pulse 78   Temp (!) 97 1 °F (36 2 °C) (Temporal)   Resp 12   Ht 5' 5" (1 651 m)   Wt 80 3 kg (177 lb)   BMI 29 45 kg/m²        Physical Exam   Constitutional: She is oriented to person, place, and time  Musculoskeletal: Normal range of motion  She exhibits no edema, tenderness or deformity  Neurological: She is alert and oriented to person, place, and time  No cranial nerve deficit  Skin: Skin is warm and dry  No rash noted  Assessment/Plan:     Diagnoses and all orders for this visit:    Arthralgia, unspecified joint  -     Comprehensive metabolic panel; Future  -     Lyme disease, western blot; Future  -     CBC and differential; Future  -     TSH, 3rd generation; Future  -     Sedimentation rate, automated; Future  -     Vitamin D 25 hydroxy;  Future  -     RF Screen w/ Reflex to [place]; Future  -     Ambulatory referral to Rheumatology;  Future    rto prn                             Valente Dorman MD

## 2018-02-07 LAB
25(OH)D3+25(OH)D2 SERPL-MCNC: 22 NG/ML (ref 30–100)
ALBUMIN SERPL-MCNC: 4.4 G/DL (ref 3.5–5.5)
ALBUMIN/GLOB SERPL: 1.6 {RATIO} (ref 1.2–2.2)
ALP SERPL-CCNC: 81 IU/L (ref 39–117)
ALT SERPL-CCNC: 7 IU/L (ref 0–32)
AST SERPL-CCNC: 17 IU/L (ref 0–40)
B BURGDOR IGG PATRN SER IB-IMP: NEGATIVE
B BURGDOR IGM PATRN SER IB-IMP: NEGATIVE
B BURGDOR18KD IGG SER QL IB: ABNORMAL
B BURGDOR23KD IGG SER QL IB: ABNORMAL
B BURGDOR23KD IGM SER QL IB: ABNORMAL
B BURGDOR28KD IGG SER QL IB: ABNORMAL
B BURGDOR30KD IGG SER QL IB: ABNORMAL
B BURGDOR39KD IGG SER QL IB: ABNORMAL
B BURGDOR39KD IGM SER QL IB: ABNORMAL
B BURGDOR41KD IGG SER QL IB: ABNORMAL
B BURGDOR41KD IGM SER QL IB: ABNORMAL
B BURGDOR45KD IGG SER QL IB: ABNORMAL
B BURGDOR58KD IGG SER QL IB: ABNORMAL
B BURGDOR66KD IGG SER QL IB: PRESENT
B BURGDOR93KD IGG SER QL IB: ABNORMAL
BASOPHILS # BLD AUTO: 0.1 X10E3/UL (ref 0–0.2)
BASOPHILS NFR BLD AUTO: 1 %
BILIRUB SERPL-MCNC: 0.6 MG/DL (ref 0–1.2)
BUN SERPL-MCNC: 13 MG/DL (ref 6–24)
BUN/CREAT SERPL: 18 (ref 9–23)
CALCIUM SERPL-MCNC: 9.2 MG/DL (ref 8.7–10.2)
CHLORIDE SERPL-SCNC: 98 MMOL/L (ref 96–106)
CO2 SERPL-SCNC: 25 MMOL/L (ref 18–29)
CREAT SERPL-MCNC: 0.71 MG/DL (ref 0.57–1)
EOSINOPHIL # BLD AUTO: 0.1 X10E3/UL (ref 0–0.4)
EOSINOPHIL NFR BLD AUTO: 2 %
ERYTHROCYTE [DISTWIDTH] IN BLOOD BY AUTOMATED COUNT: 12.7 % (ref 12.3–15.4)
ERYTHROCYTE [SEDIMENTATION RATE] IN BLOOD BY WESTERGREN METHOD: 2 MM/HR (ref 0–32)
GLOBULIN SER-MCNC: 2.7 G/DL (ref 1.5–4.5)
GLUCOSE SERPL-MCNC: 81 MG/DL (ref 65–99)
HCT VFR BLD AUTO: 37.2 % (ref 34–46.6)
HGB BLD-MCNC: 12.7 G/DL (ref 11.1–15.9)
IMM GRANULOCYTES # BLD: 0 X10E3/UL (ref 0–0.1)
IMM GRANULOCYTES NFR BLD: 0 %
LYMPHOCYTES # BLD AUTO: 2.8 X10E3/UL (ref 0.7–3.1)
LYMPHOCYTES NFR BLD AUTO: 36 %
MCH RBC QN AUTO: 28.3 PG (ref 26.6–33)
MCHC RBC AUTO-ENTMCNC: 34.1 G/DL (ref 31.5–35.7)
MCV RBC AUTO: 83 FL (ref 79–97)
MONOCYTES # BLD AUTO: 0.5 X10E3/UL (ref 0.1–0.9)
MONOCYTES NFR BLD AUTO: 6 %
NEUTROPHILS # BLD AUTO: 4.4 X10E3/UL (ref 1.4–7)
NEUTROPHILS NFR BLD AUTO: 55 %
PLATELET # BLD AUTO: 280 X10E3/UL (ref 150–379)
POTASSIUM SERPL-SCNC: 4.2 MMOL/L (ref 3.5–5.2)
PROT SERPL-MCNC: 7.1 G/DL (ref 6–8.5)
RBC # BLD AUTO: 4.49 X10E6/UL (ref 3.77–5.28)
RHEUMATOID FACT SERPL-ACNC: <10 IU/ML (ref 0–13.9)
SL AMB EGFR AFRICAN AMERICAN: 121 ML/MIN/1.73
SL AMB EGFR NON AFRICAN AMERICAN: 105 ML/MIN/1.73
SODIUM SERPL-SCNC: 139 MMOL/L (ref 134–144)
TSH SERPL DL<=0.005 MIU/L-ACNC: 0.64 UIU/ML (ref 0.45–4.5)
WBC # BLD AUTO: 7.9 X10E3/UL (ref 3.4–10.8)

## 2018-02-08 ENCOUNTER — TELEPHONE (OUTPATIENT)
Dept: FAMILY MEDICINE CLINIC | Facility: CLINIC | Age: 43
End: 2018-02-08

## 2018-02-08 NOTE — TELEPHONE ENCOUNTER
LMOM to return call - Dr Jigna Hillman message states:   pl, advise pt -  All labs are normal except for minimally decreased Vit D level at 22 ( normal from 30 + ) - Ok to try OTC 1000 IU a day of Vit D  -  Still needs to see rheumatologist as discussed at last Varun 53, [place]

## 2018-02-26 NOTE — PROGRESS NOTES
Chief Complaint  PPD read negative 0 00mm bh lpn      Active Problems    1  Acute bacterial sinusitis (461 9) (J01 90,B96 89)   2  BMI 29 0-29 9,adult (V85 25) (Z68 29)   3  Common migraine without aura (346 10) (G43 009)   4  Cough (786 2) (R05)   5  Encounter for PPD test (V74 1) (Z11 1)   6  Encounter for screening for diabetes mellitus (V77 1) (Z13 1)   7  Encounter for screening mammogram for breast cancer (V76 12) (Z12 31)   8  Generalized anxiety disorder (300 02) (F41 1)   9  Need for lipid screening (V77 91) (Z13 220)   10  Screening for colorectal cancer (V76 51) (Z12 11,Z12 12)   11  Thyroid disorder screen (V77 0) (Z13 29)    Current Meds   1  Benzonatate 200 MG Oral Capsule; TAKE 1 CAPSULE 3 TIMES DAILY AS NEEDED; Therapy: 25LPG4167 to )  Requested for: 34VYE9550; Last   Rx:09Jan2018 Ordered   2  Fluticasone Propionate 50 MCG/ACT Nasal Suspension; Two sprays in each nostril once   daily; Therapy: 43XDV2736 to (Last Rx:09Jan2018)  Requested for: 12KSR0246 Ordered   3  Moxifloxacin HCl - 400 MG Oral Tablet; take one tablet daily; Therapy: 56XFQ8075 to )  Requested for: 44MBF2036; Last   Rx:09Jan2018 Ordered   4  Omeprazole 40 MG Oral Capsule Delayed Release; take 1 capsule by mouth once daily; Therapy: 71BBZ8380 to (Evaluate:93Pdi9555)  Requested for: 95PPB2425; Last   Rx:37Umz3209 Ordered   5  Promethazine-Codeine 6 25-10 MG/5ML Oral Syrup; TAKE 5 ML BY MOUTH AT   BEDTIME AS NEEDED; Therapy: 62KUM0150 to (Evaluate:88Xmc9124); Last Rx:09Jan2018 Ordered   6  SUMAtriptan Succinate 100 MG Oral Tablet; take one for migraine; repeat in 2 hours if   needed; not to take more than 2 in 24 hours; Therapy: 83TSV8980 to (Last Rx:09Jan2018)  Requested for: 66TOC2645 Ordered    Allergies    1   No Known Drug Allergies    Plan  Encounter for PPD test    · PPD; INJECT 0 1  ML Intradermal; To Be Done: 46CTQ5776    Signatures   Electronically signed by : Roel Melvin GIAN Burns ; Jan 12 2018 12:42PM EST                       (Author)

## 2018-02-26 NOTE — RESULT NOTES
Verified Results  (1) CBC/ PLT (NO DIFF) [id number] 08:04AM [facility]     Test Name Result Flag Reference   WBC 5 3 x10E3/uL  3 4-10 8   RBC 4 54 x10E6/uL  3 77-5 28   Hemoglobin 13 0 g/dL  11 1-15 9   Hematocrit 40 2 %  34 0-46  6   MCV 89 fL  79-97   MCH 28 6 pg  26 6-33 0   MCHC 32 3 g/dL  31 5-35 7   RDW 13 6 %  12 3-15 4   Platelets 658 /IU  150-379       Signatures   Electronically signed by : MICHAEL Gallego; 2018  2:00PM EST                       (Author)

## 2018-02-26 NOTE — RESULT NOTES
Discussion/Summary   blood work in acceptable range     Verified Results  (1) LIPID PANEL, FASTING [id number] 08:04AM Evaristo Kanner     Test Name Result Flag Reference   Cholesterol, Total 172 mg/dL  100-199   Triglycerides 66 mg/dL  0-149   HDL Cholesterol 79 mg/dL  >39   VLDL Cholesterol [place] 13 mg/dL  5-40   LDL Cholesterol Calc 80 mg/dL  0-99   T  Chol/HDL Ratio 2 2 ratio units  0 0-4 4   T  Chol/HDL Ratio                                                             Men  Women                                               1/2 Avg  Risk  3 4    3 3                                                   Avg Risk  5 0    4 4                                                2X Avg  Risk  9 6    7 1                                                3X Avg  Risk 23 4   11 0     (1) TSH WITH FT4 REFLEX [id number] 08:04AM Evaristo Kanner     Test Name Result Flag Reference   TSH 0 464 uIU/mL  0 450-4 500     (1) COMPREHENSIVE METABOLIC PANEL [id number] 79:82IZ Ardeen Kanner     Test Name Result Flag Reference   Glucose, Serum 79 mg/dL  65-99   BUN 11 mg/dL  6-24   Creatinine, Serum 0 84 mg/dL  0 57-1 00   BUN/Creatinine Ratio 13  9-23   Sodium, Serum 142 mmol/L  134-144   Potassium, Serum 4 0 mmol/L  3 5-5 2   Chloride, Serum 100 mmol/L     Carbon Dioxide, Total 28 mmol/L  18-29   Calcium, Serum 9 1 mg/dL  8 7-10 2   Protein, Total, Serum 6 9 g/dL  6 0-8 5   Albumin, Serum 4 3 g/dL  3 5-5 5   Globulin, Total 2 6 g/dL  1 5-4 5   A/G Ratio 1 7  1 2-2 2   Bilirubin, Total 0 4 mg/dL  0 0-1 2   Alkaline Phosphatase, S 86 IU/L     AST (SGOT) 24 IU/L  0-40   ALT (SGPT) 8 IU/L  0-32   eGFR If NonAfricn Am 86 mL/min/1 73  >59   eGFR If Africn Am 99 mL/min/1 73  >59     [facility]) Cardiovascular Risk Assessment [id number] 08:04AM Evaristo Kanner     Test Name Result Flag Reference   Interpretation Note     -------------------------------  CARDIOVASCULAR REPORT:  -------------------------------  Current available clinical information suggests the  patient's risk is at least LOW  If the patient has two or  more major risk factors, the risk category is intermediate  If the patient has CHD or a CHD risk equivalent, the risk  category is high  If patient does not have CHD or a CHD risk  equivalent, consider use of the Pooled Cohort Equations to  estimate 10-year CVD risk, as individuals with greater than  7 5% risk may warrant more intensive therapy  The calculator  can be found at:  http://tools  cardiosource org/JJXRM-Ucwc-Duhbwbusv/  -  Insulin resistance, obesity, excessive alcohol use, smoking,  thyroid disease, nephrotic syndrome, liver disease, and  certain medications are all causes of secondary  dyslipidemia  Consider evaluation if clinically indicated  -  Therapeutic lifestyle changes are always valuable to achieve  optimal blood lipid status (diet, exercise, weight  management)  -------------------------------  LIPID MANAGEMENT  Select one patient risk category based upon medical history  and clinical judgment  Additional risk factors such as  personal or family history of premature CHD, smoking, and  hypertension modify a patient's goals of therapy  In CVD  prevention, the intensity of therapy should be adjusted to  the level of patient risk  MODERATE intensity statin therapy  generally results in an average LDL-C reduction of 30% to  less than 50% from the untreated baseline  Examples include  (daily doses): atorvastatin 10-20 mg, rosuvastatin 5-10 mg,  simvastatin 20-40 mg, pravastatin 40-80 mg, lovastatin 40  mg  HIGH intensity statin therapy generally results in an  average LDL-C reduction of 50% or more from the untreated  baseline  Examples include (daily doses): atorvastatin 40-80  mg and rosuvastatin 20 mg   -------------------------------  LOW RISK ASSESSMENT AND TREATMENT SUGGESTIONS  -------------------------------  LDL-C is optimal, 80 mg/dL  Non-HDL Cholesterol is optimal,  93 mg/dL    -  Considerations for use of statin therapy include family  history of premature atherosclerotic disease, elevated  coronary artery calcium score, ankle-brachial index < 0 9,  elevated CRP, or elevated 10-year or lifetime CVD risk   -------------------------------  INTERMEDIATE RISK ASSESSMENT AND TREATMENT SUGGESTIONS  -------------------------------  LDL-C is optimal, 80 mg/dL  Non-HDL Cholesterol is optimal,  93 mg/dL  -  Consider measurement of LDL particle number or Apo B to  adjudicate need for further LDL lowering therapy  Factors  that may influence statin use include family history of  premature atherosclerotic disease, elevated coronary artery  calcium score, ankle-brachial index < 0 9, elevated CRP, or  elevated 10-year or lifetime CVD risk  If statin cannot be  tolerated or increased, alternatives include use of an  intestinal agent (ezetimibe or bile acid sequestrant) or  niacin   -------------------------------  HIGH RISK ASSESSMENT AND TREATMENT SUGGESTIONS  -------------------------------  LDL-C is normal, 80 mg/dL  Non-HDL Cholesterol is optimal,  93 mg/dL  -  If at least a 50% LDL reduction from baseline has not been  achieved, begin or increase statin  Consider measurement of  LDL particle number or Apo B to adjudicate need for further  LDL lowering therapy  If statin cannot be tolerated or  increased, alternatives include use of an intestinal agent  (ezetimibe or bile acid sequestrant) or niacin   -------------------------------  DISCLAIMER  These assessments and treatment suggestions are provided as  a convenience in support of the physician-patient  relationship and are not intended to replace the physician's  clinical judgment  They are derived from national guidelines  in addition to other evidence and expert opinion  The  clinician should consider this information within the  context of clinical opinion and the individual patient    SEE GUIDANCE FOR CARDIOVASCULAR REPORT: Arturo Bartlett et al  2013  ACC/AHA guideline on the treatment of blood cholesterol to  reduce atherosclerotic cardiovascular risk in adults: a  report of the [employer] of Cardiology/American Heart  Association Task Force on Practice Guidelines  Circulation  2014; 129 (suppl 2): S1?S45; Contois et al  Clin Chem 2009;  55(3):407-419; Tian et al  Diabetes Care 2008;  31(4):811-82  PDF Image            Signatures   Electronically signed by : MICHAEL Montanez; Jan 12 2018 11:33AM EST                       (Author)

## 2018-04-18 ENCOUNTER — OFFICE VISIT (OUTPATIENT)
Dept: PODIATRY | Facility: CLINIC | Age: 43
End: 2018-04-18
Payer: COMMERCIAL

## 2018-04-18 VITALS
RESPIRATION RATE: 17 BRPM | BODY MASS INDEX: 29.49 KG/M2 | HEIGHT: 65 IN | DIASTOLIC BLOOD PRESSURE: 69 MMHG | WEIGHT: 177 LBS | SYSTOLIC BLOOD PRESSURE: 96 MMHG | HEART RATE: 72 BPM

## 2018-04-18 DIAGNOSIS — Q66.52 CONGENITAL PES PLANUS OF LEFT FOOT: ICD-10-CM

## 2018-04-18 DIAGNOSIS — M79.672 PAIN IN BOTH FEET: ICD-10-CM

## 2018-04-18 DIAGNOSIS — M72.2 PLANTAR FASCIITIS: Primary | ICD-10-CM

## 2018-04-18 DIAGNOSIS — M79.671 PAIN IN BOTH FEET: ICD-10-CM

## 2018-04-18 DIAGNOSIS — Q66.51 CONGENITAL PES PLANUS OF RIGHT FOOT: ICD-10-CM

## 2018-04-18 DIAGNOSIS — R26.2 DIFFICULTY WALKING: ICD-10-CM

## 2018-04-18 DIAGNOSIS — M54.16 RADICULOPATHY OF LUMBAR REGION: ICD-10-CM

## 2018-04-18 PROCEDURE — L3000 FT INSERT UCB BERKELEY SHELL: HCPCS | Performed by: PODIATRIST

## 2018-04-18 PROCEDURE — 99213 OFFICE O/P EST LOW 20 MIN: CPT | Performed by: PODIATRIST

## 2018-04-18 RX ORDER — MELOXICAM 7.5 MG/1
7.5 TABLET ORAL DAILY
Qty: 20 TABLET | Refills: 0 | Status: SHIPPED | OUTPATIENT
Start: 2018-04-18 | End: 2018-06-10

## 2018-04-18 RX ORDER — GABAPENTIN 100 MG/1
100 CAPSULE ORAL 3 TIMES DAILY
Qty: 90 CAPSULE | Refills: 0 | Status: SHIPPED | OUTPATIENT
Start: 2018-04-18 | End: 2018-06-10

## 2018-04-18 RX ORDER — METHYLPREDNISOLONE 4 MG/1
TABLET ORAL
Qty: 21 TABLET | Refills: 0 | Status: SHIPPED | OUTPATIENT
Start: 2018-04-18 | End: 2018-06-10

## 2018-04-18 NOTE — PROGRESS NOTES
Assessment/Plan:  Plantar fasciitis  Radiculopathy  Plan  Patient declines injection therapy  She declines physical therapy  We will try Medrol Dosepak  We will add gabapentin  Her feet have been casted for custom molded foot orthotics  2 pairs dispensed  We will wait rheumatology workup    No problem-specific Assessment & Plan notes found for this encounter  Review of Systems     Constitutional: as noted in HPI    ENT: as noted in HPI  Cardiovascular: no complaints of slow or fast heart rate, no chest pain, no palpitations, no leg claudication or lower extremity edema  Respiratory: cough, but-no shortness of breath,-no wheezing-and-no shortness of breath during exertion  Gastrointestinal: no complaints of abdominal pain, no constipation, no nausea or diarrhea, no vomiting, no bloody stools  Integumentary: no rashes  Neurological: headache       Constitutional: feeling poorly, but-not feeling tired  Cardiovascular: No complaints of slow heart rate, no fast heart rate, no chest pain, no palpitations, no leg claudication, no lower extremity edema  Respiratory: No complaints of shortness of breath, no wheezing, no cough, no SOB on exertion, no orthopnea, no PND  Gastrointestinal: No complaints of abdominal pain, no constipation, no nausea or vomiting, no diarrhea, no bloody stools  Psychiatric: anxiety-and-Generally patient appears improved and confirms that she is better controlled with higher dose of fluoxetine  Active Problems  1  Acquired ankle/foot deformity (736 70) (M21 969)   2  Acute bacterial bronchitis (466 0,041 9) (J20 8,B96 89)   3  Common migraine without aura (346 10) (G43 009)   4  Generalized anxiety disorder (300 02) (F41 1)   5  Ingrowing nail (703 0) (L60 0)   6  Paronychia of toe of right foot (681 11) (L03 031)   7  Pes planus, congenital (754 61) (Q66 50)   8  Plantar fibromatosis (728 71) (M72 2)   9  Right foot pain (729 5) (M79 671)   10   Screening for colorectal cancer (V76 51) (Z12 11,Z12 12)     Past Medical History   · History of Abdominal pain, LLQ (left lower quadrant) (789 04) (R10 32)   · History of Acute Bronchitis With Bronchospasm (466 0)   · History of Acute upper respiratory infection (465 9) (J06 9)   · History of Cough (786 2) (R05)   · History of Dermatomycosis (111 9) (B36 9)   · History of Encounter for PPD test (V74 1) (Z11 1)   · History of acute bacterial sinusitis (V12 69) (Z87 09)   · History of acute bacterial sinusitis (V12 69) (Z87 09)   · History of acute sinusitis (V12 69) (Z87 09)   · History of ingrown nail (V13 3) (Z87 2)   · History of pharyngitis (V12 69) (Z87 09)   · History of trichomoniasis (V12 09) (Z86 19)   · History of urinary frequency (V13 09) (W14 575)   · History of vaginitis (V13 29) (Z87 42)   · History of viral gastroenteritis (V12 09) (Z86 19)   · History of Ingrown nail (703 0) (L60 0)   · History of Joint pain, knee (719 46) (M25 569)   · History of Left maxillary sinusitis (473 0) (J32 0)   · History of Limb pain (729 5) (M79 609)   · History of Lower back pain (724 2) (M54 5)   · History of Mass of knee (719 66) (M25 869)   · History of Paronychia (681 9)   · History of Paronychia of toe (681 11) (L03 039)   · History of Paronychia of toe, unspecified laterality (681 11) (L03 039)   · History of Patellofemoral Dysfunction (736 6)   · History of Patellofemoral stress syndrome, unspecified laterality (719 46) (M22 2X9)   · History of Polycystic ovarian syndrome (256 4) (E28 2)   · History of Right foot pain (729 5) (M79 671)   · History of Symptoms involving urinary system (788 99) (R39 9)   · History of Twin pregnancy (651 00,V91 00) (O30 009)   · History of Urinary frequency (788 41) (R35 0)   · History of Urinary Tract Infection (V13 02)   · History of UTI (urinary tract infection) (599 0) (N39 0)   · History of Vaginal candidiasis (112 1) (B37 3)   · History of Varicose Veins Of Lower Extremities (454 9)   · History of Vertigo (780 4) (R42)   · History of Wrist Sprain (842 00)   · History of Yeast infection (112 9) (B37 9)     The active problems and past medical history were reviewed and updated today  Surgical History   · History of Oral Surgery Tooth Extraction   · History of Pilonidal Cyst Resection - Simple     The surgical history was reviewed and updated today  Family History  Family History    · Family history of Arthritis (V17 7)   · Family history of Colon Cancer (V16 0)   · Family history of Lung Cancer (V16 1)   · Family history of Uterine Cancer (V16 49)     The family history was reviewed and updated today  Social History   · Alcohol Use (History)   · Daily Cola Consumption (___ Cans/Day)   · Daily Tea Consumption (___ Cups/Day)   · Former smoker (Y61 61) (A92 238)  The social history was reviewed and updated today  The social history was reviewed and is unchanged  Current Meds   1  FLUoxetine HCl - 20 MG Oral Capsule; TAKE 1 CAPSULE DAILY; Therapy: 03JZR6349 to (Evaluate:11Nov2016)  Requested for: 80YEO4602; Last   Rx:01Vag4729 Ordered   2  Moxifloxacin HCl - 400 MG Oral Tablet; 1 every day; Therapy: 26QYA7888 to (Last Rx:25Cvy1535)  Requested for: 19Apr2017 Ordered   3  Moxifloxacin HCl - 400 MG Oral Tablet; take one tablet daily; Therapy: 19Apr2017 to (Last Rx:19Apr2017)  Requested for: 19Apr2017 Ordered   4  Omeprazole 40 MG Oral Capsule Delayed Release; take 1 capsule by mouth once daily; Therapy: 88RHS9893 to (Evaluate:97Aha4586)  Requested for: 85PJR0255; Last   Rx:80Ter6554 Ordered   5  Promethazine-DM 6 25-15 MG/5ML Oral Syrup; TAKE 5-10 ML Bedtime PRN cough; Therapy: 19Apr2017 to (Evaluate:45Nan4614)  Requested for: 19Apr2017; Last   Rx:19Apr2017 Ordered   6  Proventil  (90 Base) MCG/ACT Inhalation Aerosol Solution; INHALE TWO   PUFFS EVERY FOUR HOURS AS NEEDED FOR WHEEZING; Therapy: 19Apr2017 to (Last Rx:19Apr2017)  Requested for: 44Bww5469 Ordered   7  SUMAtriptan Succinate 100 MG Oral Tablet; take one for migraine; repeat in 2 hours if   needed; not to take more than 2 in 24 hours; Therapy: 40FBB3079 to (Last Rx:19Apr2017)  Requested for: 45Iyg4696 Ordered     The medication list was reviewed and updated today  There are no diagnoses linked to this encounter  Subjective:      Patient ID: Ami Aragon is a 43 y o  female  Patient complains of pain in her feet  She has pain in her right heel  She has pain in her legs  She suffers from low back pain  She is presently undergoing workup by Rheumatology  The following portions of the patient's history were reviewed and updated as appropriate: allergies, current medications, past family history, past medical history, past social history, past surgical history and problem list     Review of Systems      Objective: Foot Exam    General  General Appearance: appears stated age and healthy   Orientation: alert and oriented to person, place, and time   Affect: appropriate   Gait: antalgic       Right Foot/Ankle     Inspection and Palpation  Ecchymosis: none  Tenderness: plantar fascia   Swelling: plantar fascia   Arch: pes planus  Hammertoes: fifth toe  Hallux valgus: no  Hallux limitus: yes  Skin Exam: skin intact; Neurovascular  Dorsalis pedis: 3+  Posterior tibial: 3+  Superficial peroneal nerve sensation: diminished  Deep peroneal nerve sensation: diminished  Achilles reflex: 1+    Muscle Strength  Ankle dorsiflexion: 4+    Tests  PT Tinel's sign: negative    Too many toes: positive     Comments  Pain with palpation heel  4/5 L5 testing bilateral     Left Foot/Ankle      Inspection and Palpation  Ecchymosis: none  Tenderness: plantar fascia   Swelling: none   Arch: pes planus  Hammertoes: fifth toe  Hallux valgus: no  Hallux limitus: yes  Skin Exam: skin intact;      Neurovascular  Dorsalis pedis: 3+  Posterior tibial: 3+  Superficial peroneal nerve sensation: diminished  Deep peroneal nerve sensation: diminished  Achilles reflex: 1+    Muscle Strength  Ankle dorsiflexion: 4+    Tests  Too many toes: positive         Physical Exam   Cardiovascular:   Pulses:       Dorsalis pedis pulses are 3+ on the right side, and 3+ on the left side  Posterior tibial pulses are 3+ on the right side, and 3+ on the left side  Neurological:   Reflex Scores:       Achilles reflexes are 1+ on the right side and 1+ on the left side

## 2018-04-20 ENCOUNTER — HOSPITAL ENCOUNTER (OUTPATIENT)
Dept: RADIOLOGY | Facility: HOSPITAL | Age: 43
Discharge: HOME | End: 2018-04-20
Payer: COMMERCIAL

## 2018-04-20 ENCOUNTER — TELEPHONE (OUTPATIENT)
Dept: FAMILY MEDICINE CLINIC | Facility: CLINIC | Age: 43
End: 2018-04-20

## 2018-04-20 DIAGNOSIS — Z12.31 ENCOUNTER FOR SCREENING MAMMOGRAM FOR MALIGNANT NEOPLASM OF BREAST: ICD-10-CM

## 2018-04-20 PROCEDURE — 77067 SCR MAMMO BI INCL CAD: CPT

## 2018-04-20 NOTE — TELEPHONE ENCOUNTER
----- Message from 3Ejasson Fry [facility] sent at 4/20/2018  3:07 PM EDT -----  Mammo is acceptable  Follow up as scheduled

## 2018-05-10 ENCOUNTER — OFFICE VISIT (OUTPATIENT)
Dept: PODIATRY | Facility: CLINIC | Age: 43
End: 2018-05-10
Payer: COMMERCIAL

## 2018-05-10 VITALS
HEIGHT: 65 IN | WEIGHT: 177 LBS | DIASTOLIC BLOOD PRESSURE: 81 MMHG | SYSTOLIC BLOOD PRESSURE: 122 MMHG | BODY MASS INDEX: 29.49 KG/M2

## 2018-05-10 DIAGNOSIS — S92.014A CLOSED NONDISPLACED FRACTURE OF BODY OF RIGHT CALCANEUS, INITIAL ENCOUNTER: ICD-10-CM

## 2018-05-10 DIAGNOSIS — M72.2 PLANTAR FASCIITIS: Primary | ICD-10-CM

## 2018-05-10 DIAGNOSIS — G57.51 TARSAL TUNNEL SYNDROME OF RIGHT SIDE: ICD-10-CM

## 2018-05-10 PROCEDURE — 99213 OFFICE O/P EST LOW 20 MIN: CPT | Performed by: PODIATRIST

## 2018-05-10 RX ORDER — NAPROXEN 500 MG/1
500 TABLET ORAL 2 TIMES DAILY WITH MEALS
Qty: 40 TABLET | Refills: 0 | Status: SHIPPED | OUTPATIENT
Start: 2018-05-10 | End: 2018-05-31 | Stop reason: SDUPTHER

## 2018-05-10 RX ORDER — GABAPENTIN 300 MG/1
300 CAPSULE ORAL 2 TIMES DAILY
Qty: 60 CAPSULE | Refills: 0 | Status: SHIPPED | OUTPATIENT
Start: 2018-05-10 | End: 2018-05-31 | Stop reason: SDUPTHER

## 2018-05-10 RX ORDER — ETODOLAC 400 MG/1
400 TABLET, FILM COATED ORAL 2 TIMES DAILY
Qty: 60 TABLET | Refills: 0 | Status: SHIPPED | OUTPATIENT
Start: 2018-05-10 | End: 2018-05-10

## 2018-05-10 NOTE — PROGRESS NOTES
Assessment/Plan:  Plantar fasciitis  Rule out tarsal tunnel syndrome  Rule out calcaneal stress fracture  Plan  Patient declines injection  We will check orthotics  MRI will be ordered to rule out fracture  Will change oral medication  No problem-specific Assessment & Plan notes found for this encounter  There are no diagnoses linked to this encounter  Subjective:      Patient ID: Bert Gonzales is a 43 y o  female  Patient is having continued pain  She has pain in her right heel  She has pain in her right foot  The following portions of the patient's history were reviewed and updated as appropriate: allergies, current medications, past family history, past medical history, past social history, past surgical history and problem list     Review of Systems      Objective:  Review of Systems     Constitutional: as noted in HPI    ENT: as noted in HPI  Cardiovascular: no complaints of slow or fast heart rate, no chest pain, no palpitations, no leg claudication or lower extremity edema  Respiratory: cough, but-no shortness of breath,-no wheezing-and-no shortness of breath during exertion  Gastrointestinal: no complaints of abdominal pain, no constipation, no nausea or diarrhea, no vomiting, no bloody stools  Integumentary: no rashes  Neurological: headache       Constitutional: feeling poorly, but-not feeling tired  Cardiovascular: No complaints of slow heart rate, no fast heart rate, no chest pain, no palpitations, no leg claudication, no lower extremity edema  Respiratory: No complaints of shortness of breath, no wheezing, no cough, no SOB on exertion, no orthopnea, no PND  Gastrointestinal: No complaints of abdominal pain, no constipation, no nausea or vomiting, no diarrhea, no bloody stools     Psychiatric: anxiety-and-Generally patient appears improved and confirms that she is better controlled with higher dose of fluoxetine       Active Problems  1  Acquired ankle/foot deformity (736 70) (M21 969)   2  Acute bacterial bronchitis (466 0,041 9) (J20 8,B96 89)   3  Common migraine without aura (346 10) (G43 009)   4  Generalized anxiety disorder (300 02) (F41 1)   5  Ingrowing nail (703 0) (L60 0)   6  Paronychia of toe of right foot (681 11) (L03 031)   7  Pes planus, congenital (754 61) (Q66 50)   8  Plantar fibromatosis (728 71) (M72 2)   9  Right foot pain (729 5) (M79 671)   10  Screening for colorectal cancer (V76 51) (Z12 11,Z12 12)     Past Medical History   · History of Abdominal pain, LLQ (left lower quadrant) (789 04) (R10 32)   · History of Acute Bronchitis With Bronchospasm (466 0)   · History of Acute upper respiratory infection (465 9) (J06 9)   · History of Cough (786 2) (R05)   · History of Dermatomycosis (111 9) (B36 9)   · History of Encounter for PPD test (V74 1) (Z11 1)   · History of acute bacterial sinusitis (V12 69) (Z87 09)   · History of acute bacterial sinusitis (V12 69) (Z87 09)   · History of acute sinusitis (V12 69) (Z87 09)   · History of ingrown nail (V13 3) (Z87 2)   · History of pharyngitis (V12 69) (Z87 09)   · History of trichomoniasis (V12 09) (Z86 19)   · History of urinary frequency (V13 09) (Z87 898)   · History of vaginitis (V13 29) (Z87 42)   · History of viral gastroenteritis (V12 09) (Z86 19)   · History of Ingrown nail (703 0) (L60 0)   · History of Joint pain, knee (719 46) (M25 569)   · History of Left maxillary sinusitis (473 0) (J32 0)   · History of Limb pain (729 5) (M79 609)   · History of Lower back pain (724 2) (M54 5)   · History of Mass of knee (719 66) (M25 869)   · History of Paronychia (681 9)   · History of Paronychia of toe (681 11) (L03 039)   · History of Paronychia of toe, unspecified laterality (681 11) (L03 039)   · History of Patellofemoral Dysfunction (736 6)   · History of Patellofemoral stress syndrome, unspecified laterality (719 46) (M22 2X9)   · History of Polycystic ovarian syndrome (256 4) (E28 2)   · History of Right foot pain (729 5) (M79 671)   · History of Symptoms involving urinary system (788 99) (R39 9)   · History of Twin pregnancy (651 00,V91 00) (O30 009)   · History of Urinary frequency (788 41) (R35 0)   · History of Urinary Tract Infection (V13 02)   · History of UTI (urinary tract infection) (599 0) (N39 0)   · History of Vaginal candidiasis (112 1) (B37 3)   · History of Varicose Veins Of Lower Extremities (454 9)   · History of Vertigo (780 4) (R42)   · History of Wrist Sprain (842 00)   · History of Yeast infection (112 9) (B37 9)     The active problems and past medical history were reviewed and updated today       Surgical History   · History of Oral Surgery Tooth Extraction   · History of Pilonidal Cyst Resection - Simple     The surgical history was reviewed and updated today        Family History  Family History    · Family history of Arthritis (V17 7)   · Family history of Colon Cancer (V16 0)   · Family history of Lung Cancer (V16 1)   · Family history of Uterine Cancer (V16 49)     The family history was reviewed and updated today        Social History   · Alcohol Use (History)   · Daily Cola Consumption (___ Cans/Day)   · Daily Tea Consumption (___ Cups/Day)   · Former smoker (J14 64) (Z87 891)  The social history was reviewed and updated today   The social history was reviewed and is unchanged       Current Meds   1  FLUoxetine HCl - 20 MG Oral Capsule; TAKE 1 CAPSULE DAILY;   Therapy: 02MWH2108 to (Evaluate:11Nov2016)  Requested for: 55EIW3559; Last   Rx:17Yyd9311 Ordered   2  Moxifloxacin HCl - 400 MG Oral Tablet; 1 every day;   Therapy: 90NHM5438 to (Last Rx:59Pwt1187)  Requested for: 56Qrm1066 Ordered   3  Moxifloxacin HCl - 400 MG Oral Tablet; take one tablet daily;   Therapy: 19Apr2017 to (Last Rx:19Apr2017)  Requested for: 19Apr2017 Ordered   4  Omeprazole 40 MG Oral Capsule Delayed Release; take 1 capsule by mouth once daily;   Therapy: 48VWX8041 to ([place])  Requested for: 57KXT4071; Last   Rx:40Hid5038 Ordered   5  Promethazine-DM 6 25-15 MG/5ML Oral Syrup; TAKE 5-10 ML Bedtime PRN cough;   Therapy: 94UJY8387 to (Evaluate:33Syh9147)  Requested for: 19Apr2017; Last   Rx:19Apr2017 Ordered   6  Proventil  (90 Base) MCG/ACT Inhalation Aerosol Solution; INHALE TWO   PUFFS EVERY FOUR HOURS AS NEEDED FOR WHEEZING;   Therapy: 86ONC3759 to (Last Rx:19Apr2017)  Requested for: 19Apr2017 Ordered   7  SUMAtriptan Succinate 100 MG Oral Tablet; take one for migraine; repeat in 2 hours if   needed; not to take more than 2 in 24 hours;  Margaret Purchase: 09JYA8008 to (Last Rx:19Apr2017)  Requested for: 19Apr2017 Ordered     The medication list was reviewed and updated today              There are no diagnoses linked to this encounter        Subjective:       Patient ID: Blanca Valenzuela is a 43 y o  female      Patient complains of pain in her feet  She has pain in her right heel  She has pain in her legs  She suffers from low back pain    She is presently undergoing workup by Rheumatology         The following portions of the patient's history were reviewed and updated as appropriate: allergies, current medications, past family history, past medical history, past social history, past surgical history and problem list      Review of Systems       Objective:      Foot Exam     General  General Appearance: appears stated age and healthy   Orientation: alert and oriented to person, place, and time   Affect: appropriate   Gait: antalgic         Right Foot/Ankle      Inspection and Palpation  Ecchymosis: none  Tenderness: plantar fascia   Swelling: plantar fascia   Arch: pes planus  Hammertoes: fifth toe  Hallux valgus: no  Hallux limitus: yes  Skin Exam: skin intact;      Neurovascular  Dorsalis pedis: 3+  Posterior tibial: 3+  Superficial peroneal nerve sensation: diminished  Deep peroneal nerve sensation: diminished  Achilles reflex: 1+     Muscle Strength  Ankle dorsiflexion: 4+     Tests  PT Tinel's sign: negative    Too many toes: positive      Comments  Pain with palpation heel  4/5 L5 testing bilateral      Left Foot/Ankle       Inspection and Palpation  Ecchymosis: none  Tenderness: plantar fascia   Swelling: none   Arch: pes planus  Hammertoes: fifth toe  Hallux valgus: no  Hallux limitus: yes  Skin Exam: skin intact;      Neurovascular  Dorsalis pedis: 3+  Posterior tibial: 3+  Superficial peroneal nerve sensation: diminished  Deep peroneal nerve sensation: diminished  Achilles reflex: 1+     Muscle Strength  Ankle dorsiflexion: 4+     Tests  Too many toes: positive         Physical Exam   Cardiovascular:   Pulses:       Dorsalis pedis pulses are 3+ on the right side, and 3+ on the left side  Posterior tibial pulses are 3+ on the right side, and 3+ on the left side     Neurological:   Reflex Scores:       Achilles reflexes are 1+ on the right side and 1+ on the left side           Foot ExamPhysical Exam

## 2018-05-24 ENCOUNTER — TELEPHONE (OUTPATIENT)
Dept: FAMILY MEDICINE CLINIC | Facility: CLINIC | Age: 43
End: 2018-05-24

## 2018-05-24 ENCOUNTER — OFFICE VISIT (OUTPATIENT)
Dept: FAMILY MEDICINE CLINIC | Facility: CLINIC | Age: 43
End: 2018-05-24
Payer: COMMERCIAL

## 2018-05-24 VITALS
WEIGHT: 182 LBS | TEMPERATURE: 98.1 F | RESPIRATION RATE: 16 BRPM | HEART RATE: 68 BPM | SYSTOLIC BLOOD PRESSURE: 110 MMHG | BODY MASS INDEX: 30.32 KG/M2 | DIASTOLIC BLOOD PRESSURE: 66 MMHG | HEIGHT: 65 IN

## 2018-05-24 DIAGNOSIS — J01.41 ACUTE RECURRENT PANSINUSITIS: Primary | ICD-10-CM

## 2018-05-24 PROBLEM — M32.8 OTHER FORMS OF SYSTEMIC LUPUS ERYTHEMATOSUS (HCC): Status: ACTIVE | Noted: 2018-05-24

## 2018-05-24 PROCEDURE — 99213 OFFICE O/P EST LOW 20 MIN: CPT | Performed by: NURSE PRACTITIONER

## 2018-05-24 RX ORDER — MOXIFLOXACIN HYDROCHLORIDE 400 MG/1
400 TABLET ORAL DAILY
Qty: 7 TABLET | Refills: 0 | Status: SHIPPED | OUTPATIENT
Start: 2018-05-24 | End: 2018-05-31

## 2018-05-24 RX ORDER — HYDROXYCHLOROQUINE SULFATE 200 MG/1
200 TABLET, FILM COATED ORAL 2 TIMES DAILY
Refills: 0 | COMMUNITY
Start: 2018-05-02

## 2018-05-24 RX ORDER — ERGOCALCIFEROL 1.25 MG/1
CAPSULE ORAL
Refills: 0 | COMMUNITY
Start: 2018-04-18 | End: 2018-08-09 | Stop reason: ALTCHOICE

## 2018-05-24 RX ORDER — SUMATRIPTAN 100 MG/1
TABLET, FILM COATED ORAL
Refills: 0 | COMMUNITY
Start: 2018-04-17 | End: 2018-09-27 | Stop reason: SDUPTHER

## 2018-05-24 NOTE — LETTER
Date: 5/24/2018    To whom it may concern: This is to certify that Luz Elena Rodriguez has been under my care for the following diagnosis: Chronic back pain with radiculopathy, Lupus  Unable to sit for prolong periods greater than one hour at a time  I feel that she is unable to serve on Jury Duty at this time for the above mentioned medical reasons                    Sincerely,   Jean Carlos Lyle, [address]

## 2018-05-24 NOTE — PROGRESS NOTES
Assessment:      Acute bacterial sinusitis      Plan:      1  Mucinex  2  Avelox  3  Nasal saline rinses as needed for congestion  4  Follow-up with PCP in 1 week if symptoms worsen or persist      Supportive care as dicussed    Subjective:       Raegan Schuler is a 43 y o  female who presents for evaluation of possible sinus infection  Symptoms include bilateral ear pressure/pain, facial pain, headache described as dull, low grade fever, nasal congestion, post nasal drip and purulent nasal discharge  Onset of symptoms was 2 weeks ago, gradually worsening since that time  She is drinking plenty of fluids  Past history is significant for no history of pneumonia or bronchitis  Patient is a non-smoker  Reports frequent sinus infections  +sick contacts  The following portions of the patient's history were reviewed and updated as appropriate: allergies, current medications, past family history, past medical history, past social history, past surgical history and problem list     Review of Systems  Pertinent items are noted in HPI        Objective:      /66 (BP Location: Left arm, Patient Position: Sitting, Cuff Size: Adult)   Pulse 68   Temp 98 1 °F (36 7 °C) (Temporal)   Resp 16   Ht 5' 5" (1 651 m)   Wt 82 6 kg (182 lb)   BMI 30 29 kg/m²   General appearance: alert and oriented, in no acute distress  Head: Normocephalic, without obvious abnormality, sinuses tender to percussion  Ears: abnormal TM right ear - dull and abnormal TM left ear - dull  Nose: no discharge, turbinates red, swollen  Throat: abnormal findings: moderate oropharyngeal erythema  Lungs: clear to auscultation bilaterally  Heart: regular rate and rhythm, S1, S2 normal, no murmur, click, rub or gallop  Pulses: 2+ and symmetric  Lymph nodes: Cervical adenopathy: present  Neurologic: Grossly normal

## 2018-05-24 NOTE — PATIENT INSTRUCTIONS
Sinusitis   AMBULATORY CARE:   Sinusitis  is inflammation or infection of your sinuses  It is most often caused by a virus  Acute sinusitis may last up to 12 weeks  Chronic sinusitis lasts longer than 12 weeks  Recurrent sinusitis means you have 4 or more times in 1 year  Common symptoms include the following:   · Fever    · Pain, pressure, redness, or swelling around the forehead, cheeks, or eyes    · Thick yellow or green discharge from your nose    · Tenderness when you touch your face over your sinuses    · Dry cough that happens mostly at night or when you lie down    · Headache and face pain that is worse when you lean forward    · Tooth pain, or pain when you chew  Seek care immediately if:   · Your eye and eyelid are red, swollen, and painful  · You cannot open your eye  · You have vision changes, such as double vision  · Your eyeball bulges out or you cannot move your eye  · You are more sleepy than normal, or you notice changes in your ability to think, move, or talk  · You have a stiff neck, a fever, or a bad headache  · You have swelling of your forehead or scalp  Contact your healthcare provider if:   · Your symptoms do not improve after 3 days  · Your symptoms do not go away after 10 days  · You have nausea and are vomiting  · Your nose is bleeding  · You have questions or concerns about your condition or care  Treatment for sinusitis:  Your symptoms may go away on their own  Your healthcare provider may recommend watchful waiting for up to 10 days before starting antibiotics  You may  need any of the following:  · Acetaminophen  decreases pain and fever  It is available without a doctor's order  Ask how much to take and how often to take it  Follow directions  Read the labels of all other medicines you are using to see if they also contain acetaminophen, or ask your doctor or pharmacist  Acetaminophen can cause liver damage if not taken correctly   Do not use more than 4 grams (4,000 milligrams) total of acetaminophen in one day  · NSAIDs , such as ibuprofen, help decrease swelling, pain, and fever  This medicine is available with or without a doctor's order  NSAIDs can cause stomach bleeding or kidney problems in certain people  If you take blood thinner medicine, always ask your healthcare provider if NSAIDs are safe for you  Always read the medicine label and follow directions  · Nasal steroid sprays  may help decrease inflammation in your nose and sinuses  · Decongestants  help reduce swelling and drain mucus in the nose and sinuses  They may help you breathe easier  · Antihistamines  help dry mucus in the nose and relieve sneezing  · Antibiotics  help treat or prevent a bacterial infection  · Take your medicine as directed  Contact your healthcare provider if you think your medicine is not helping or if you have side effects  Tell him or her if you are allergic to any medicine  Keep a list of the medicines, vitamins, and herbs you take  Include the amounts, and when and why you take them  Bring the list or the pill bottles to follow-up visits  Carry your medicine list with you in case of an emergency  Self-care:   · Rinse your sinuses  Use a sinus rinse device to rinse your nasal passages with a saline (salt water) solution or distilled water  Do not use tap water  This will help thin the mucus in your nose and rinse away pollen and dirt  It will also help reduce swelling so you can breathe normally  Ask your healthcare provider how often to do this  · Breathe in steam   Heat a bowl of water until you see steam  Lean over the bowl and make a tent over your head with a large towel  Breathe deeply for about 20 minutes  Be careful not to get too close to the steam or burn yourself  Do this 3 times a day  You can also breathe deeply when you take a hot shower  · Sleep with your head elevated    Place an extra pillow under your head before you go to sleep to help your sinuses drain  · Drink liquids as directed  Ask your healthcare provider how much liquid to drink each day and which liquids are best for you  Liquids will thin the mucus in your nose and help it drain  Avoid drinks that contain alcohol or caffeine  · Do not smoke, and avoid secondhand smoke  Nicotine and other chemicals in cigarettes and cigars can make your symptoms worse  Ask your healthcare provider for information if you currently smoke and need help to quit  E-cigarettes or smokeless tobacco still contain nicotine  Talk to your healthcare provider before you use these products  Prevent the spread of germs that cause sinusitis:  Wash your hands often with soap and water  Wash your hands after you use the bathroom, change a child's diaper, or sneeze  Wash your hands before you prepare or eat food  Follow up with your healthcare provider as directed: You may be referred to an ear, nose, and throat specialist  Write down your questions so you remember to ask them during your visits  © 2017 [facility] Information is for End User's use only and may not be sold, redistributed or otherwise used for commercial purposes  All illustrations and images included in CareNotes® are the copyrighted property of [employer]  or [employer]  The above information is an  only  It is not intended as medical advice for individual conditions or treatments  Talk to your doctor, nurse or pharmacist before following any medical regimen to see if it is safe and effective for you

## 2018-05-24 NOTE — TELEPHONE ENCOUNTER
Dr Nayeli Lemos,     Patient was called to jury duty and was asking fi she could get a note for that because of her back pain and not being able to sit there for that long? She said that she either sees you or ORLÉANS here and then she asked to make an appointment for today because of sinus pain so I gave her a same day with ORLÉANS since you are out  I gave ABHIJIT 30 minutes incase she starts to ask her about jury duty

## 2018-05-31 ENCOUNTER — OFFICE VISIT (OUTPATIENT)
Dept: PODIATRY | Facility: CLINIC | Age: 43
End: 2018-05-31
Payer: COMMERCIAL

## 2018-05-31 VITALS
BODY MASS INDEX: 30.32 KG/M2 | DIASTOLIC BLOOD PRESSURE: 71 MMHG | SYSTOLIC BLOOD PRESSURE: 110 MMHG | WEIGHT: 182 LBS | HEIGHT: 65 IN

## 2018-05-31 DIAGNOSIS — M79.671 RIGHT FOOT PAIN: ICD-10-CM

## 2018-05-31 DIAGNOSIS — M79.671 PAIN IN BOTH FEET: ICD-10-CM

## 2018-05-31 DIAGNOSIS — Q66.52 CONGENITAL PES PLANUS OF LEFT FOOT: ICD-10-CM

## 2018-05-31 DIAGNOSIS — M79.672 PAIN IN BOTH FEET: ICD-10-CM

## 2018-05-31 DIAGNOSIS — G57.51 TARSAL TUNNEL SYNDROME OF RIGHT SIDE: ICD-10-CM

## 2018-05-31 DIAGNOSIS — M72.2 PLANTAR FASCIITIS: Primary | ICD-10-CM

## 2018-05-31 DIAGNOSIS — Q66.51 CONGENITAL PES PLANUS OF RIGHT FOOT: ICD-10-CM

## 2018-05-31 DIAGNOSIS — R26.2 DIFFICULTY WALKING: ICD-10-CM

## 2018-05-31 PROCEDURE — L3000 FT INSERT UCB BERKELEY SHELL: HCPCS | Performed by: PODIATRIST

## 2018-05-31 PROCEDURE — 99213 OFFICE O/P EST LOW 20 MIN: CPT | Performed by: PODIATRIST

## 2018-05-31 RX ORDER — NAPROXEN 500 MG/1
500 TABLET ORAL 2 TIMES DAILY WITH MEALS
Qty: 40 TABLET | Refills: 0 | Status: SHIPPED | OUTPATIENT
Start: 2018-05-31 | End: 2018-08-08 | Stop reason: SDUPTHER

## 2018-05-31 RX ORDER — GABAPENTIN 300 MG/1
300 CAPSULE ORAL 2 TIMES DAILY
Qty: 60 CAPSULE | Refills: 0 | Status: SHIPPED | OUTPATIENT
Start: 2018-05-31 | End: 2018-08-08 | Stop reason: SDUPTHER

## 2018-05-31 NOTE — PROGRESS NOTES
Assessment/Plan:  Chronic plantar fasciitis  Rule out tarsal tunnel syndrome  Plan  X-rays taken  Right heel trigger point injection done  1 cc of Kenalog 10 injected into the right heel without pain or complication  Patient declines therapy due to time constraints  MRI is ordered  Patient will use orthotics daily  Refill medications ordered  No problem-specific Assessment & Plan notes found for this encounter  There are no diagnoses linked to this encounter  Subjective:  Patient has continued ongoing pain in the right heel  Her insurance is denying  MRI  Patient ID: Lois Bentley is a 43 y o  female  HPI    The following portions of the patient's history were reviewed and updated as appropriate: allergies, current medications, past family history, past medical history, past social history, past surgical history and problem list     Review of Systems      Objective:     Objective:  Review of Systems     Constitutional: as noted in HPI    ENT: as noted in HPI  Cardiovascular: no complaints of slow or fast heart rate, no chest pain, no palpitations, no leg claudication or lower extremity edema  Respiratory: cough, but-no shortness of breath,-no wheezing-and-no shortness of breath during exertion  Gastrointestinal: no complaints of abdominal pain, no constipation, no nausea or diarrhea, no vomiting, no bloody stools  Integumentary: no rashes  Neurological: headache       Constitutional: feeling poorly, but-not feeling tired  Cardiovascular: No complaints of slow heart rate, no fast heart rate, no chest pain, no palpitations, no leg claudication, no lower extremity edema  Respiratory: No complaints of shortness of breath, no wheezing, no cough, no SOB on exertion, no orthopnea, no PND  Gastrointestinal: No complaints of abdominal pain, no constipation, no nausea or vomiting, no diarrhea, no bloody stools     Psychiatric: anxiety-and-Generally patient appears improved and confirms that she is better controlled with higher dose of fluoxetine       Active Problems  1  Acquired ankle/foot deformity (736 70) (M21 969)   2  Acute bacterial bronchitis (466 0,041 9) (J20 8,B96 89)   3  Common migraine without aura (346 10) (G43 009)   4  Generalized anxiety disorder (300 02) (F41 1)   5  Ingrowing nail (703 0) (L60 0)   6  Paronychia of toe of right foot (681 11) (L03 031)   7  Pes planus, congenital (754 61) (Q66 50)   8  Plantar fibromatosis (728 71) (M72 2)   9  Right foot pain (729 5) (M79 671)   10  Screening for colorectal cancer (V76 51) (Z12 11,Z12 12)     Past Medical History   · History of Abdominal pain, LLQ (left lower quadrant) (789 04) (R10 32)   · History of Acute Bronchitis With Bronchospasm (466 0)   · History of Acute upper respiratory infection (465 9) (J06 9)   · History of Cough (786 2) (R05)   · History of Dermatomycosis (111 9) (B36 9)   · History of Encounter for PPD test (V74 1) (Z11 1)   · History of acute bacterial sinusitis (V12 69) (Z87 09)   · History of acute bacterial sinusitis (V12 69) (Z87 09)   · History of acute sinusitis (V12 69) (Z87 09)   · History of ingrown nail (V13 3) (Z87 2)   · History of pharyngitis (V12 69) (Z87 09)   · History of trichomoniasis (V12 09) (Z86 19)   · History of urinary frequency (V13 09) (Z87 898)   · History of vaginitis (V13 29) (Z87 42)   · History of viral gastroenteritis (V12 09) (Z86 19)   · History of Ingrown nail (703 0) (L60 0)   · History of Joint pain, knee (719 46) (M25 569)   · History of Left maxillary sinusitis (473 0) (J32 0)   · History of Limb pain (729 5) (M79 609)   · History of Lower back pain (724 2) (M54 5)   · History of Mass of knee (719 66) (M25 869)   · History of Paronychia (681 9)   · History of Paronychia of toe (681 11) (L03 039)   · History of Paronychia of toe, unspecified laterality (681 11) (L03 039)   · History of Patellofemoral Dysfunction (736 6)   · History of Patellofemoral stress syndrome, unspecified laterality (719 46) (M22 2X9)   · History of Polycystic ovarian syndrome (256 4) (E28 2)   · History of Right foot pain (729 5) (M79 671)   · History of Symptoms involving urinary system (788 99) (R39 9)   · History of Twin pregnancy (651 00,V91 00) (O30 009)   · History of Urinary frequency (788 41) (R35 0)   · History of Urinary Tract Infection (V13 02)   · History of UTI (urinary tract infection) (599 0) (N39 0)   · History of Vaginal candidiasis (112 1) (B37 3)   · History of Varicose Veins Of Lower Extremities (454 9)   · History of Vertigo (780 4) (R42)   · History of Wrist Sprain (842 00)   · History of Yeast infection (112 9) (B37 9)     The active problems and past medical history were reviewed and updated today       Surgical History   · History of Oral Surgery Tooth Extraction   · History of Pilonidal Cyst Resection - Simple     The surgical history was reviewed and updated today        Family History  Family History    · Family history of Arthritis (V17 7)   · Family history of Colon Cancer (V16 0)   · Family history of Lung Cancer (V16 1)   · Family history of Uterine Cancer (V16 49)     The family history was reviewed and updated today        Social History   · Alcohol Use (History)   · Daily Cola Consumption (___ Cans/Day)   · Daily Tea Consumption (___ Cups/Day)   · Former smoker (B03 34) (Z87 891)  The social history was reviewed and updated today   The social history was reviewed and is unchanged       Current Meds   1  FLUoxetine HCl - 20 MG Oral Capsule; TAKE 1 CAPSULE DAILY;   Therapy: 60SZG4981 to (Evaluate:11Nov2016)  Requested for: 85FOX0513; Last   Rx:41Mry3769 Ordered   2  Moxifloxacin HCl - 400 MG Oral Tablet; 1 every day;   Therapy: 03DDS6514 to (Last Rx:60Fag1732)  Requested for: 19Apr2017 Ordered   3  Moxifloxacin HCl - 400 MG Oral Tablet; take one tablet daily;   Therapy: 22XSU2652 to (Last Rx:19Apr2017)  Requested for: 19Apr2017 Ordered   4  Omeprazole 40 MG Oral Capsule Delayed Release; take 1 capsule by mouth once daily;   Therapy: 71SIX7032 to (Evaluate:50Ebb7349)  Requested for: 36QBK8910; Last   Rx:45Ozg7321 Ordered   5  Promethazine-DM 6 25-15 MG/5ML Oral Syrup; TAKE 5-10 ML Bedtime PRN cough;   Therapy: 78ZBG6489 to (Evaluate:85Ofk5566)  Requested for: 15Zbo7376; Last   Rx:19Apr2017 Ordered   6  Proventil  (90 Base) MCG/ACT Inhalation Aerosol Solution; INHALE TWO   PUFFS EVERY FOUR HOURS AS NEEDED FOR WHEEZING;   Therapy: 58LFO5891 to (Last Rx:19Apr2017)  Requested for: 55Bmv3793 Ordered   7  SUMAtriptan Succinate 100 MG Oral Tablet; take one for migraine; repeat in 2 hours if   needed; not to take more than 2 in 24 hours;   Therapy: 16VCM0758 to (Last Rx:19Apr2017)  Requested for: 01Lww6503 Ordered     The medication list was reviewed and updated today              Foot ExamPhysical Exam

## 2018-06-10 ENCOUNTER — HOSPITAL ENCOUNTER (EMERGENCY)
Facility: HOSPITAL | Age: 43
Discharge: HOME | End: 2018-06-10
Attending: EMERGENCY MEDICINE | Admitting: EMERGENCY MEDICINE
Payer: COMMERCIAL

## 2018-06-10 VITALS
BODY MASS INDEX: 29.95 KG/M2 | OXYGEN SATURATION: 98 % | TEMPERATURE: 99.8 F | SYSTOLIC BLOOD PRESSURE: 103 MMHG | RESPIRATION RATE: 18 BRPM | HEART RATE: 79 BPM | WEIGHT: 180 LBS | DIASTOLIC BLOOD PRESSURE: 54 MMHG

## 2018-06-10 DIAGNOSIS — J02.0 STREP PHARYNGITIS: Primary | ICD-10-CM

## 2018-06-10 DIAGNOSIS — E87.6 HYPOKALEMIA: ICD-10-CM

## 2018-06-10 DIAGNOSIS — R79.89 ABNORMAL TSH: ICD-10-CM

## 2018-06-10 LAB
ALBUMIN SERPL BCP-MCNC: 3.6 G/DL (ref 3.5–5)
ALP SERPL-CCNC: 66 U/L (ref 46–116)
ALT SERPL W P-5'-P-CCNC: 12 U/L (ref 12–78)
ANION GAP SERPL CALCULATED.3IONS-SCNC: 7 MMOL/L (ref 4–13)
AST SERPL W P-5'-P-CCNC: 11 U/L (ref 5–45)
BASOPHILS # BLD AUTO: 0.04 THOUSANDS/ΜL (ref 0–0.1)
BASOPHILS NFR BLD AUTO: 0 % (ref 0–1)
BILIRUB SERPL-MCNC: 1 MG/DL (ref 0.2–1)
BUN SERPL-MCNC: 11 MG/DL (ref 5–25)
CALCIUM SERPL-MCNC: 8.8 MG/DL (ref 8.3–10.1)
CHLORIDE SERPL-SCNC: 101 MMOL/L (ref 100–108)
CO2 SERPL-SCNC: 30 MMOL/L (ref 21–32)
CREAT SERPL-MCNC: 0.91 MG/DL (ref 0.6–1.3)
EOSINOPHIL # BLD AUTO: 0 THOUSAND/ΜL (ref 0–0.61)
EOSINOPHIL NFR BLD AUTO: 0 % (ref 0–6)
ERYTHROCYTE [DISTWIDTH] IN BLOOD BY AUTOMATED COUNT: 13.3 % (ref 11.6–15.1)
ERYTHROCYTE [SEDIMENTATION RATE] IN BLOOD: 11 MM/HOUR (ref 2–25)
GFR SERPL CREATININE-BSD FRML MDRD: 78 ML/MIN/1.73SQ M
GLUCOSE SERPL-MCNC: 110 MG/DL (ref 65–140)
HCT VFR BLD AUTO: 39.6 % (ref 34.8–46.1)
HGB BLD-MCNC: 12.7 G/DL (ref 11.5–15.4)
IMM GRANULOCYTES # BLD AUTO: 0.09 THOUSAND/UL (ref 0–0.2)
IMM GRANULOCYTES NFR BLD AUTO: 1 % (ref 0–2)
LYMPHOCYTES # BLD AUTO: 1.17 THOUSANDS/ΜL (ref 0.6–4.47)
LYMPHOCYTES NFR BLD AUTO: 8 % (ref 14–44)
MCH RBC QN AUTO: 27.4 PG (ref 26.8–34.3)
MCHC RBC AUTO-ENTMCNC: 32.1 G/DL (ref 31.4–37.4)
MCV RBC AUTO: 86 FL (ref 82–98)
MONOCYTES # BLD AUTO: 0.92 THOUSAND/ΜL (ref 0.17–1.22)
MONOCYTES NFR BLD AUTO: 6 % (ref 4–12)
NEUTROPHILS # BLD AUTO: 13.46 THOUSANDS/ΜL (ref 1.85–7.62)
NEUTS SEG NFR BLD AUTO: 85 % (ref 43–75)
NRBC BLD AUTO-RTO: 0 /100 WBCS
PLATELET # BLD AUTO: 243 THOUSANDS/UL (ref 149–390)
PMV BLD AUTO: 9.8 FL (ref 8.9–12.7)
POTASSIUM SERPL-SCNC: 3 MMOL/L (ref 3.5–5.3)
PROT SERPL-MCNC: 7.3 G/DL (ref 6.4–8.2)
RBC # BLD AUTO: 4.63 MILLION/UL (ref 3.81–5.12)
S PYO AG THROAT QL: POSITIVE
SODIUM SERPL-SCNC: 138 MMOL/L (ref 136–145)
TSH SERPL DL<=0.05 MIU/L-ACNC: 0.2 UIU/ML (ref 0.36–3.74)
WBC # BLD AUTO: 15.68 THOUSAND/UL (ref 4.31–10.16)

## 2018-06-10 PROCEDURE — 85025 COMPLETE CBC W/AUTO DIFF WBC: CPT | Performed by: EMERGENCY MEDICINE

## 2018-06-10 PROCEDURE — 99284 EMERGENCY DEPT VISIT MOD MDM: CPT

## 2018-06-10 PROCEDURE — 87430 STREP A AG IA: CPT | Performed by: EMERGENCY MEDICINE

## 2018-06-10 PROCEDURE — 80053 COMPREHEN METABOLIC PANEL: CPT | Performed by: EMERGENCY MEDICINE

## 2018-06-10 PROCEDURE — 84443 ASSAY THYROID STIM HORMONE: CPT | Performed by: EMERGENCY MEDICINE

## 2018-06-10 PROCEDURE — 36415 COLL VENOUS BLD VENIPUNCTURE: CPT | Performed by: EMERGENCY MEDICINE

## 2018-06-10 PROCEDURE — 85652 RBC SED RATE AUTOMATED: CPT | Performed by: EMERGENCY MEDICINE

## 2018-06-10 RX ORDER — POTASSIUM CHLORIDE 20 MEQ/1
40 TABLET, EXTENDED RELEASE ORAL ONCE
Status: COMPLETED | OUTPATIENT
Start: 2018-06-10 | End: 2018-06-10

## 2018-06-10 RX ORDER — AMOXICILLIN 500 MG/1
500 CAPSULE ORAL 3 TIMES DAILY
Qty: 30 CAPSULE | Refills: 0 | Status: SHIPPED | OUTPATIENT
Start: 2018-06-10 | End: 2018-06-20

## 2018-06-10 RX ADMIN — POTASSIUM CHLORIDE 40 MEQ: 1500 TABLET, EXTENDED RELEASE ORAL at 15:29

## 2018-06-10 RX ADMIN — SODIUM CHLORIDE 1000 ML: 0.9 INJECTION, SOLUTION INTRAVENOUS at 15:01

## 2018-06-10 NOTE — ED PROVIDER NOTES
History  Chief Complaint   Patient presents with    Weakness - Generalized     States she has not been feeling well since last week but attributed to being dx with Lupus last month  States has been very tired, sore throat since last night, chills, did not take temp  Today when took a shower her legs were weak and her fingers contracted  Patient is a 49-year-old female that was recently diagnosed with lupus presents with complaint of feeling generalized weakness and exhaustion  Patient had an episode this morning in the shower where she felt weak she had to sit down states she might have been hyperventilating and she had her fingers were cramped and claw like motion that lasted approximately 15 minutes  Patient also awoke with the sore throat, there has been no fevers or chills, no nausea vomiting or diarrhea  Patient states his exhaustion has been going on patient recently has started a medication for lupus  Patient denies any aggravating or alleviating factors  Patient denies any chest pain, no shortness of breath, no abdominal pain, no nausea vomiting or diarrhea  Patient denies any difficulty with moving her bowels or passing urine  Prior to Admission Medications   Prescriptions Last Dose Informant Patient Reported? Taking? SUMAtriptan (IMITREX) 100 mg tablet 6/9/2018 at Unknown time  Yes Yes   ergocalciferol (VITAMIN D2) 50,000 units Past Week at Unknown time  Yes Yes   Sig: take 1 capsule by mouth every week  X 12 WEEKS-THEN DISCONTINUE A   (REFER TO PRESCRIPTION NOTES)     gabapentin (NEURONTIN) 300 mg capsule 6/9/2018 at Unknown time  No Yes   Sig: Take 1 capsule (300 mg total) by mouth 2 (two) times a day for 30 days   hydroxychloroquine (PLAQUENIL) 200 mg tablet 6/10/2018 at 1230  Yes Yes   Sig: Take 200 mg by mouth 2 (two) times a day   naproxen (NAPROSYN) 500 mg tablet 6/9/2018 at Unknown time  No Yes   Sig: Take 1 tablet (500 mg total) by mouth 2 (two) times a day with meals for 20 days   omeprazole (PriLOSEC) 20 mg delayed release capsule More than a month at Unknown time  Yes No   Sig: Take 20 mg by mouth as needed      Facility-Administered Medications: None       Past Medical History:   Diagnosis Date    Acquired ankle/foot deformity     last assessed: 2017    Anxiety     Chronic pain disorder     right foot plantar    Dermatomycosis 2008    Dysfunctional uterine bleeding     Last assessed: 2018    Headache     Lupus     Mass of knee     last assessed: 2014    Mild acid reflux     Paronychia of finger     last assessed: 2013    Paronychia of toe     last asssessed: 2016   right foot    Patellofemoral dysfunction     last assessed: 3/21/2014    PCOS (polycystic ovarian syndrome) 12/15/2007    Pes planus, congenital     last assessed: 2017    Plantar fasciitis of right foot     Plantar fibromatosis     last assessed: 2017    PONV (postoperative nausea and vomiting)     Trichomoniasis     last assessed: 2015    Vaginal candidiasis     last assessed: 2016    Varicose veins of lower extremity     last assessed: 3/5/2014    Vertigo     last assessed: 3/5/2014    Viral gastroenteritis     last assessed: 2015       Past Surgical History:   Procedure Laterality Date    BACK SURGERY      discectomy lumbar     SECTION      x2 bikini    PILONIDAL CYST EXCISION      NH HYSTEROSCOPY,W/ENDO BX N/A 10/18/2017    Procedure: HYSTEROSCOPY AND FRACTIONAL DILATATION AND CURRETTAGE;  Surgeon: Tito Becerra MD;  Location: [facility] MAIN OR;  Service: Gynecology    TUBAL LIGATION  2016    WISDOM TOOTH EXTRACTION         Family History   Problem Relation Age of Onset    Cancer Mother      skin , gallbladder    Liver disease Mother     Hypertension Father     Heart disease Sister     Cancer Brother      skin    No Known Problems Daughter     No Known Problems Son     No Known Problems Son     Colon cancer Maternal Uncle     Arthritis Family     Lung cancer Family     Uterine cancer Family      I have reviewed and agree with the history as documented  Social History   Substance Use Topics    Smoking status: Former Smoker     Packs/day: 0 50     Years: 20 00     Quit date: 2010    Smokeless tobacco: Never Used    Alcohol use Yes      Comment: occ        Review of Systems   Constitutional: Positive for fatigue  Negative for chills and fever  HENT: Positive for sore throat  Negative for drooling, facial swelling, rhinorrhea and trouble swallowing  Eyes: Negative for photophobia and visual disturbance  Respiratory: Negative for cough, chest tightness and shortness of breath  Cardiovascular: Negative for chest pain and leg swelling  Gastrointestinal: Negative for abdominal pain, nausea and vomiting  Genitourinary: Negative for dysuria and flank pain  Musculoskeletal: Negative for back pain and neck pain  Skin: Negative  Neurological: Negative for weakness and numbness  Hematological: Negative  Psychiatric/Behavioral: Negative  Physical Exam  Physical Exam   Constitutional: She is oriented to person, place, and time  She appears well-developed and well-nourished  No distress  HENT:   Head: Normocephalic and atraumatic  Mouth/Throat: Uvula is midline and mucous membranes are normal  Posterior oropharyngeal edema and posterior oropharyngeal erythema present  No oropharyngeal exudate  Cardiovascular: Normal rate and regular rhythm  Pulmonary/Chest: Effort normal and breath sounds normal    Abdominal: Soft  Bowel sounds are normal  She exhibits no distension  There is no tenderness  Musculoskeletal: Normal range of motion  Neurological: She is alert and oriented to person, place, and time  Skin: Skin is warm and dry  Psychiatric: She has a normal mood and affect  Nursing note and vitals reviewed        Vital Signs  ED Triage Vitals [06/10/18 1312]   Temperature Pulse Respirations Blood Pressure SpO2   99 8 °F (37 7 °C) 79 18 103/54 98 %      Temp Source Heart Rate Source Patient Position - Orthostatic VS BP Location FiO2 (%)   Oral Monitor Lying Left arm --      Pain Score       5           Vitals:    06/10/18 1312   BP: 103/54   Pulse: 79   Patient Position - Orthostatic VS: Lying       Visual Acuity      ED Medications  Medications   sodium chloride 0 9 % bolus 1,000 mL (1,000 mL Intravenous New Bag 6/10/18 1501)   potassium chloride (K-DUR,KLOR-CON) CR tablet 40 mEq (not administered)       Diagnostic Studies  Results Reviewed     Procedure Component Value Units Date/Time    Sedimentation rate, automated [[id number]]  (Normal) Collected:  06/10/18 1417    Lab Status:  Final result Specimen:  Blood from Arm, Left Updated:  06/10/18 1526     Sed Rate 11 mm/hour     Comprehensive metabolic panel [[id number]]  (Abnormal) Collected:  06/10/18 1417    Lab Status:  Final result Specimen:  Blood from Arm, Left Updated:  06/10/18 1521     Sodium 138 mmol/L      Potassium 3 0 (L) mmol/L      Chloride 101 mmol/L      CO2 30 mmol/L      Anion Gap 7 mmol/L      BUN 11 mg/dL      Creatinine 0 91 mg/dL      Glucose 110 mg/dL      Calcium 8 8 mg/dL      AST 11 U/L      ALT 12 U/L      Alkaline Phosphatase 66 U/L      Total Protein 7 3 g/dL      Albumin 3 6 g/dL      Total Bilirubin 1 00 mg/dL      eGFR 78 ml/min/1 73sq m     Narrative:         National Kidney Disease Education Program recommendations are as follows:  GFR calculation is accurate only with a steady state creatinine  Chronic Kidney disease less than 60 ml/min/1 73 sq  meters  Kidney failure less than 15 ml/min/1 73 sq  meters      TSH [[id number]]  (Abnormal) Collected:  06/10/18 1417    Lab Status:  Final result Specimen:  Blood from Arm, Left Updated:  06/10/18 1521     TSH 3RD GENERATON 0 200 (L) uIU/mL     Narrative:         Patients undergoing fluorescein dye angiography may retain small amounts of fluorescein in the body for 48-72 hours post procedure  Samples containing fluorescein can produce falsely depressed TSH values  If the patient had this procedure,a specimen should be resubmitted post fluorescein clearance  The recommended reference ranges for TSH during pregnancy are as follows:  First trimester 0 1 to 2 5 uIU/mL  Second trimester  0 2 to 3 0 uIU/mL  Third trimester 0 3 to 3 0 uIU/m      Rapid Beta strep screen [[id number]]  (Abnormal) Collected:  06/10/18 1417    Lab Status:  Final result Specimen:  Throat from Throat Updated:  06/10/18 1448     Rapid Strep A Screen Positive (A)    CBC and differential [[id number]]  (Abnormal) Collected:  06/10/18 1417    Lab Status:  Final result Specimen:  Blood from Arm, Left Updated:  06/10/18 1436     WBC 15 68 (H) Thousand/uL      RBC 4 63 Million/uL      Hemoglobin 12 7 g/dL      Hematocrit 39 6 %      MCV 86 fL      MCH 27 4 pg      MCHC 32 1 g/dL      RDW 13 3 %      MPV 9 8 fL      Platelets 442 Thousands/uL      nRBC 0 /100 WBCs      Neutrophils Relative 85 (H) %      Immat GRANS % 1 %      Lymphocytes Relative 8 (L) %      Monocytes Relative 6 %      Eosinophils Relative 0 %      Basophils Relative 0 %      Neutrophils Absolute 13 46 (H) Thousands/µL      Immature Grans Absolute 0 09 Thousand/uL      Lymphocytes Absolute 1 17 Thousands/µL      Monocytes Absolute 0 92 Thousand/µL      Eosinophils Absolute 0 00 Thousand/µL      Basophils Absolute 0 04 Thousands/µL                  No orders to display              Procedures  Procedures       Phone Contacts  ED Phone Contact    ED Course                               MDM  Number of Diagnoses or Management Options  Abnormal TSH:   Hypokalemia:   Strep pharyngitis:   Diagnosis management comments: Patient workup showed a low TSH, low potassium and strep throat    Patient was informed of these findings she is going to be given amoxicillin for her strep throat she was given 40 mEq of potassium in the emergency room and she is going to eat banana daily or drink arms use for the next 3-5 days  Patient also needs to follow up where a complete thyroid panel with her primary care doctor  Patient states understanding is in agreement with the assessment plan  Amount and/or Complexity of Data Reviewed  Clinical lab tests: ordered and reviewed      CritCare Time    Disposition  Final diagnoses:   Strep pharyngitis   Hypokalemia   Abnormal TSH     Time reflects when diagnosis was documented in both MDM as applicable and the Disposition within this note     Time User Action Codes Description Comment    6/10/2018  3:25 PM Tsiha Starcher Add [J02 0] Strep pharyngitis     6/10/2018  3:25 PM Tisha Starcher Add [E87 6] Hypokalemia     6/10/2018  3:25 PM Tisha Starcher Add [R94 6] Abnormal TSH       ED Disposition     ED Disposition Condition Comment    Discharge  Leeta Space discharge to home/self care  Condition at discharge: Stable        Follow-up Information     Follow up With Specialties Details Why Contact Info    Lynda Souza MD Family Medicine Schedule an appointment as soon as possible for a visit in 2 days  [address] [facility] [address]  [phone]            Patient's Medications   Discharge Prescriptions    AMOXICILLIN (AMOXIL) 500 MG CAPSULE    Take 1 capsule (500 mg total) by mouth 3 (three) times a day for 10 days       Start Date: 6/10/2018 End Date: 6/20/2018       Order Dose: 500 mg       Quantity: 30 capsule    Refills: 0     No discharge procedures on file      ED Provider  Electronically Signed by           Stephanie Valderrama MD  06/10/18 9779

## 2018-06-10 NOTE — DISCHARGE INSTRUCTIONS
Hypokalemia   WHAT YOU NEED TO KNOW:   Hypokalemia is a low level of potassium in your blood  Potassium helps control how your muscles, heart, and digestive system work  Hypokalemia occurs when your body loses too much potassium or does not absorb enough from food  DISCHARGE INSTRUCTIONS:   Seek care immediately if:   · You cannot move your arm or leg  · You have a fast or irregular heartbeat  · You are too tired or weak to stand up  Contact your healthcare provider if:   · You are vomiting, or you have diarrhea  · You have numbness or tingling in your arms or legs  · Your symptoms do not go away or they get worse  · You have questions or concerns about your condition or care  Medicines:   · Potassium  will be given to bring your potassium levels back to normal     · Take your medicine as directed  Contact your healthcare provider if you think your medicine is not helping or if you have side effects  Tell him of her if you are allergic to any medicine  Keep a list of the medicines, vitamins, and herbs you take  Include the amounts, and when and why you take them  Bring the list or the pill bottles to follow-up visits  Carry your medicine list with you in case of an emergency  Eat foods that are high in potassium:  Foods that are high in potassium include bananas, oranges, tomatoes, potatoes, and avocado  Rajput beans, turkey, salmon, lean beef, yogurt, and milk are also high in potassium  Ask your healthcare provider or dietitian for more information about foods that are high in potassium  Follow up with your healthcare provider as directed:  Write down your questions so you remember to ask them during your visits  © 2017 [employer]  Information is for End User's use only and may not be sold, redistributed or otherwise used for commercial purposes   All illustrations and images included in CareNotes® are the copyrighted property of [employer]  or [employer]  The above information is an  only  It is not intended as medical advice for individual conditions or treatments  Talk to your doctor, nurse or pharmacist before following any medical regimen to see if it is safe and effective for you  Pharyngitis   WHAT YOU NEED TO KNOW:   Pharyngitis, or sore throat, is inflammation of the tissues and structures in your pharynx (throat)  Pharyngitis is most often caused by bacteria  It may also be caused by a cold or flu virus  Other causes include smoking, allergies, or acid reflux  DISCHARGE INSTRUCTIONS:   Call 911 for any of the following:   · You have trouble breathing or swallowing because your throat is swollen or sore  Return to the emergency department if:   · You are drooling because it hurts too much to swallow  · Your fever is higher than 102? F (39?C) or lasts longer than 3 days  · You are confused  · You taste blood in your throat  Contact your healthcare provider if:   · Your throat pain gets worse  · You have a painful lump in your throat that does not go away after 5 days  · Your symptoms do not improve after 5 days  · You have questions or concerns about your condition or care  Medicines:  Viral pharyngitis will go away on its own without treatment  Your sore throat should start to feel better in 3 to 5 days for both viral and bacterial infections  You may need any of the following:  · Antibiotics  treat a bacterial infection  · NSAIDs , such as ibuprofen, help decrease swelling, pain, and fever  NSAIDs can cause stomach bleeding or kidney problems in certain people  If you take blood thinner medicine, always ask your healthcare provider if NSAIDs are safe for you  Always read the medicine label and follow directions  · Acetaminophen  decreases pain and fever  It is available without a doctor's order  Ask how much to take and how often to take it  Follow directions   Acetaminophen can cause liver damage if not taken correctly  · Take your medicine as directed  Contact your healthcare provider if you think your medicine is not helping or if you have side effects  Tell him or her if you are allergic to any medicine  Keep a list of the medicines, vitamins, and herbs you take  Include the amounts, and when and why you take them  Bring the list or the pill bottles to follow-up visits  Carry your medicine list with you in case of an emergency  Manage your symptoms:   · Gargle salt water  Mix ¼ teaspoon salt in an 8 ounce glass of warm water and gargle  This may help decrease swelling in your throat  · Drink liquids as directed  You may need to drink more liquids than usual  Liquids may help soothe your throat and prevent dehydration  Ask how much liquid to drink each day and which liquids are best for you  · Use a cool-steam humidifier  to help moisten the air in your room and calm your cough  · Soothe your throat  with cough drops, ice, soft foods, or popsicles  Prevent the spread of pharyngitis:  Cover your mouth and nose when you cough or sneeze  Do not share food or drinks  Wash your hands often  Use soap and water  If soap and water are unavailable, use an alcohol based hand   Follow up with your healthcare provider as directed:  Write down your questions so you remember to ask them during your visits  © 2017 [employer] Information is for End User's use only and may not be sold, redistributed or otherwise used for commercial purposes  All illustrations and images included in CareNotes® are the copyrighted property of A [employer]  or [employer]  The above information is an  only  It is not intended as medical advice for individual conditions or treatments  Talk to your doctor, nurse or pharmacist before following any medical regimen to see if it is safe and effective for you

## 2018-06-11 ENCOUNTER — VBI (OUTPATIENT)
Dept: ADMINISTRATIVE | Facility: OTHER | Age: 43
End: 2018-06-11

## 2018-08-08 DIAGNOSIS — M72.2 PLANTAR FASCIITIS: ICD-10-CM

## 2018-08-08 DIAGNOSIS — G57.51 TARSAL TUNNEL SYNDROME OF RIGHT SIDE: ICD-10-CM

## 2018-08-08 RX ORDER — NAPROXEN 500 MG/1
500 TABLET ORAL 2 TIMES DAILY WITH MEALS
Qty: 40 TABLET | Refills: 0 | Status: SHIPPED | OUTPATIENT
Start: 2018-08-08 | End: 2018-09-28 | Stop reason: SDUPTHER

## 2018-08-08 RX ORDER — GABAPENTIN 300 MG/1
300 CAPSULE ORAL 2 TIMES DAILY
Qty: 60 CAPSULE | Refills: 0 | Status: SHIPPED | OUTPATIENT
Start: 2018-08-08 | End: 2019-01-21

## 2018-08-09 ENCOUNTER — OFFICE VISIT (OUTPATIENT)
Dept: FAMILY MEDICINE CLINIC | Facility: CLINIC | Age: 43
End: 2018-08-09
Payer: COMMERCIAL

## 2018-08-09 VITALS
HEART RATE: 68 BPM | HEIGHT: 65 IN | TEMPERATURE: 98.1 F | RESPIRATION RATE: 20 BRPM | WEIGHT: 181 LBS | SYSTOLIC BLOOD PRESSURE: 120 MMHG | DIASTOLIC BLOOD PRESSURE: 66 MMHG | BODY MASS INDEX: 30.16 KG/M2

## 2018-08-09 DIAGNOSIS — J32.0 LEFT MAXILLARY SINUSITIS: Primary | ICD-10-CM

## 2018-08-09 PROCEDURE — 99213 OFFICE O/P EST LOW 20 MIN: CPT | Performed by: NURSE PRACTITIONER

## 2018-08-09 RX ORDER — MOXIFLOXACIN HYDROCHLORIDE 400 MG/1
400 TABLET ORAL DAILY
Qty: 10 TABLET | Refills: 0 | Status: SHIPPED | OUTPATIENT
Start: 2018-08-09 | End: 2018-08-19

## 2018-08-09 RX ORDER — FLUTICASONE PROPIONATE 50 MCG
2 SPRAY, SUSPENSION (ML) NASAL DAILY
Qty: 16 G | Refills: 0 | Status: SHIPPED | OUTPATIENT
Start: 2018-08-09 | End: 2018-11-09 | Stop reason: SDUPTHER

## 2018-08-09 RX ORDER — MELATONIN
1000 DAILY
COMMUNITY

## 2018-08-09 NOTE — PATIENT INSTRUCTIONS
Sinusitis   AMBULATORY CARE:   Sinusitis  is inflammation or infection of your sinuses  It is most often caused by a virus  Acute sinusitis may last up to 12 weeks  Chronic sinusitis lasts longer than 12 weeks  Recurrent sinusitis means you have 4 or more times in 1 year  Common symptoms include the following:   · Fever    · Pain, pressure, redness, or swelling around the forehead, cheeks, or eyes    · Thick yellow or green discharge from your nose    · Tenderness when you touch your face over your sinuses    · Dry cough that happens mostly at night or when you lie down    · Headache and face pain that is worse when you lean forward    · Tooth pain, or pain when you chew  Seek care immediately if:   · Your eye and eyelid are red, swollen, and painful  · You cannot open your eye  · You have vision changes, such as double vision  · Your eyeball bulges out or you cannot move your eye  · You are more sleepy than normal, or you notice changes in your ability to think, move, or talk  · You have a stiff neck, a fever, or a bad headache  · You have swelling of your forehead or scalp  Contact your healthcare provider if:   · Your symptoms do not improve after 3 days  · Your symptoms do not go away after 10 days  · You have nausea and are vomiting  · Your nose is bleeding  · You have questions or concerns about your condition or care  Treatment for sinusitis:  Your symptoms may go away on their own  Your healthcare provider may recommend watchful waiting for up to 10 days before starting antibiotics  You may  need any of the following:  · Acetaminophen  decreases pain and fever  It is available without a doctor's order  Ask how much to take and how often to take it  Follow directions  Read the labels of all other medicines you are using to see if they also contain acetaminophen, or ask your doctor or pharmacist  Acetaminophen can cause liver damage if not taken correctly   Do not use more than 4 grams (4,000 milligrams) total of acetaminophen in one day  · NSAIDs , such as ibuprofen, help decrease swelling, pain, and fever  This medicine is available with or without a doctor's order  NSAIDs can cause stomach bleeding or kidney problems in certain people  If you take blood thinner medicine, always ask your healthcare provider if NSAIDs are safe for you  Always read the medicine label and follow directions  · Nasal steroid sprays  may help decrease inflammation in your nose and sinuses  · Decongestants  help reduce swelling and drain mucus in the nose and sinuses  They may help you breathe easier  · Antihistamines  help dry mucus in the nose and relieve sneezing  · Antibiotics  help treat or prevent a bacterial infection  · Take your medicine as directed  Contact your healthcare provider if you think your medicine is not helping or if you have side effects  Tell him or her if you are allergic to any medicine  Keep a list of the medicines, vitamins, and herbs you take  Include the amounts, and when and why you take them  Bring the list or the pill bottles to follow-up visits  Carry your medicine list with you in case of an emergency  Self-care:   · Rinse your sinuses  Use a sinus rinse device to rinse your nasal passages with a saline (salt water) solution or distilled water  Do not use tap water  This will help thin the mucus in your nose and rinse away pollen and dirt  It will also help reduce swelling so you can breathe normally  Ask your healthcare provider how often to do this  · Breathe in steam   Heat a bowl of water until you see steam  Lean over the bowl and make a tent over your head with a large towel  Breathe deeply for about 20 minutes  Be careful not to get too close to the steam or burn yourself  Do this 3 times a day  You can also breathe deeply when you take a hot shower  · Sleep with your head elevated    Place an extra pillow under your head before you go to sleep to help your sinuses drain  · Drink liquids as directed  Ask your healthcare provider how much liquid to drink each day and which liquids are best for you  Liquids will thin the mucus in your nose and help it drain  Avoid drinks that contain alcohol or caffeine  · Do not smoke, and avoid secondhand smoke  Nicotine and other chemicals in cigarettes and cigars can make your symptoms worse  Ask your healthcare provider for information if you currently smoke and need help to quit  E-cigarettes or smokeless tobacco still contain nicotine  Talk to your healthcare provider before you use these products  Prevent the spread of germs that cause sinusitis:  Wash your hands often with soap and water  Wash your hands after you use the bathroom, change a child's diaper, or sneeze  Wash your hands before you prepare or eat food  Follow up with your healthcare provider as directed: You may be referred to an ear, nose, and throat specialist  Write down your questions so you remember to ask them during your visits  © 2017 [address] [facility] Information is for End User's use only and may not be sold, redistributed or otherwise used for commercial purposes  All illustrations and images included in CareNotes® are the copyrighted property of [employer]  or [employer]  The above information is an  only  It is not intended as medical advice for individual conditions or treatments  Talk to your doctor, nurse or pharmacist before following any medical regimen to see if it is safe and effective for you

## 2018-08-09 NOTE — PROGRESS NOTES
Assessment:      Acute bacterial sinusitis      Plan:      1  flonase  2  Avelox  3  Nasal saline rinses as needed for congestion  4  Follow-up with  in 1 week if symptoms worsen or persist     Supportive care as discussed  Subjective:       Erika Posey is a 37 y o  female who presents for evaluation of possible sinus infection  Symptoms include bilateral ear pressure/pain, achiness, facial pain, headache described as left side face, low grade fever, nasal congestion, productive cough with  yellow colored sputum, purulent nasal discharge and sinus pressure  Onset of symptoms was 2 weeks ago, gradually worsening since that time  She is drinking moderate amounts of fluids  Past history is significant for hx of sinusitis  Patient is a non-smoker  + sick contacts  Taking ibu for sxs relief  The following portions of the patient's history were reviewed and updated as appropriate: allergies, current medications, past family history, past medical history, past social history, past surgical history and problem list     Review of Systems  Pertinent items are noted in HPI        Objective:      /66 (BP Location: Left arm, Patient Position: Sitting, Cuff Size: Adult)   Pulse 68   Temp 98 1 °F (36 7 °C) (Temporal)   Resp 20   Ht 5' 5" (1 651 m)   Wt 82 1 kg (181 lb)   BMI 30 12 kg/m²   General appearance: alert and oriented, in no acute distress  Head: Normocephalic, without obvious abnormality, sinuses tender to percussion  Ears: abnormal TM right ear - dull and abnormal TM left ear - dull  Nose: yellow discharge, turbinates red  Throat: abnormal findings: moderate oropharyngeal erythema  Lungs: clear to auscultation bilaterally  Heart: regular rate and rhythm, S1, S2 normal, no murmur, click, rub or gallop  Pulses: 2+ and symmetric  Lymph nodes: Cervical adenopathy: present  Neurologic: Grossly normal

## 2018-09-04 ENCOUNTER — CLINICAL SUPPORT (OUTPATIENT)
Dept: FAMILY MEDICINE CLINIC | Facility: CLINIC | Age: 43
End: 2018-09-04
Payer: COMMERCIAL

## 2018-09-04 DIAGNOSIS — Z11.1 ENCOUNTER FOR PPD TEST: Primary | ICD-10-CM

## 2018-09-04 PROCEDURE — 86580 TB INTRADERMAL TEST: CPT

## 2018-09-06 ENCOUNTER — CLINICAL SUPPORT (OUTPATIENT)
Dept: FAMILY MEDICINE CLINIC | Facility: CLINIC | Age: 43
End: 2018-09-06

## 2018-09-06 DIAGNOSIS — Z11.1 ENCOUNTER FOR PPD SKIN TEST READING: ICD-10-CM

## 2018-09-06 PROCEDURE — 99024 POSTOP FOLLOW-UP VISIT: CPT

## 2018-09-18 ENCOUNTER — CLINICAL SUPPORT (OUTPATIENT)
Dept: FAMILY MEDICINE CLINIC | Facility: CLINIC | Age: 43
End: 2018-09-18
Payer: COMMERCIAL

## 2018-09-18 DIAGNOSIS — Z11.1 PPD SCREENING TEST: Primary | ICD-10-CM

## 2018-09-18 PROCEDURE — 86580 TB INTRADERMAL TEST: CPT

## 2018-09-20 ENCOUNTER — CLINICAL SUPPORT (OUTPATIENT)
Dept: FAMILY MEDICINE CLINIC | Facility: CLINIC | Age: 43
End: 2018-09-20

## 2018-09-20 DIAGNOSIS — Z11.1 ENCOUNTER FOR PPD SKIN TEST READING: Primary | ICD-10-CM

## 2018-09-20 PROCEDURE — 99024 POSTOP FOLLOW-UP VISIT: CPT

## 2018-09-27 ENCOUNTER — OFFICE VISIT (OUTPATIENT)
Dept: FAMILY MEDICINE CLINIC | Facility: CLINIC | Age: 43
End: 2018-09-27
Payer: COMMERCIAL

## 2018-09-27 VITALS
SYSTOLIC BLOOD PRESSURE: 110 MMHG | TEMPERATURE: 97.4 F | HEIGHT: 65 IN | HEART RATE: 70 BPM | WEIGHT: 187.6 LBS | DIASTOLIC BLOOD PRESSURE: 68 MMHG | RESPIRATION RATE: 116 BRPM | BODY MASS INDEX: 31.25 KG/M2

## 2018-09-27 DIAGNOSIS — G43.009 MIGRAINE WITHOUT AURA AND WITHOUT STATUS MIGRAINOSUS, NOT INTRACTABLE: Primary | ICD-10-CM

## 2018-09-27 DIAGNOSIS — J32.9 SINUSITIS, UNSPECIFIED CHRONICITY, UNSPECIFIED LOCATION: Primary | ICD-10-CM

## 2018-09-27 PROCEDURE — 99213 OFFICE O/P EST LOW 20 MIN: CPT | Performed by: FAMILY MEDICINE

## 2018-09-27 PROCEDURE — 3008F BODY MASS INDEX DOCD: CPT | Performed by: FAMILY MEDICINE

## 2018-09-27 RX ORDER — MOXIFLOXACIN HYDROCHLORIDE 400 MG/1
400 TABLET ORAL DAILY
Qty: 7 TABLET | Refills: 0 | Status: SHIPPED | OUTPATIENT
Start: 2018-09-27 | End: 2018-10-02 | Stop reason: SDUPTHER

## 2018-09-27 RX ORDER — SUMATRIPTAN 100 MG/1
100 TABLET, FILM COATED ORAL ONCE AS NEEDED
Qty: 9 TABLET | Refills: 1 | Status: SHIPPED | OUTPATIENT
Start: 2018-09-27 | End: 2019-01-02 | Stop reason: SDUPTHER

## 2018-09-27 NOTE — PROGRESS NOTES
Sarah Lee 1975 female MRN: [id number]    FAMILY PRACTICE ACUTE OFFICE VISIT  [facility] - [address]      ASSESSMENT/PLAN  Sarah Lee is a 37 y o  female presents to the office for    Sinusitis, unspecified chronicity, unspecified location  - Encourage hydration and rest  If fever develops > 100 4 to take tylenol as needed  - If symptoms worsen to please contact the office   -  Started on  moxifloxacin (AVELOX) 400 MG tablet; Take 1 tablet (400 mg total) by mouth daily for 7 days        Disposition:  Return to the office in 7 days to see PCP    No future appointments  SUBJECTIVE  CC: Earache (entire left side of face hurts)      HPI:  Sarah Lee is a 37 y o  female who presents for a couple days of left-sided pain  She states that she has left ear pain, jaw pain, head pain  Denies any fevers but has a productive cough  Patient states that she usually responds to Avelox when she has these acute infections  Currently denies any other complaints  Review of Systems   Constitutional: Positive for fatigue  Negative for activity change, appetite change, chills and fever  HENT: Positive for congestion, ear pain and sinus pain  Eyes: Negative for visual disturbance  Respiratory: Positive for cough  Negative for chest tightness and shortness of breath  Cardiovascular: Negative for chest pain and leg swelling  Gastrointestinal: Negative for abdominal distention, abdominal pain, constipation, diarrhea, nausea and vomiting  Allergic/Immunologic: Negative for environmental allergies  Neurological: Positive for headaches  Negative for dizziness and light-headedness  All other systems reviewed and are negative        Historical Information   The patient history was reviewed as follows:  Past Medical History:   Diagnosis Date    Acquired ankle/foot deformity     last assessed: 8/29/2017    Anxiety     Chronic pain disorder     right foot plantar    Dermatomycosis 2008    Dysfunctional uterine bleeding     Last assessed: 2018    Headache     Lupus     Mass of knee     last assessed: 2014    Mild acid reflux     Paronychia of finger     last assessed: 2013    Paronychia of toe     last asssessed: 2016   right foot    Patellofemoral dysfunction     last assessed: 3/21/2014    PCOS (polycystic ovarian syndrome) 12/15/2007    Pes planus, congenital     last assessed: 2017    Plantar fasciitis of right foot     Plantar fibromatosis     last assessed: 2017    PONV (postoperative nausea and vomiting)     Trichomoniasis     last assessed: 2015    Vaginal candidiasis     last assessed: 2016    Varicose veins of lower extremity     last assessed: 3/5/2014    Vertigo     last assessed: 3/5/2014    Viral gastroenteritis     last assessed: 2015         Past Surgical History:   Procedure Laterality Date    BACK SURGERY      discectomy lumbar     SECTION      x2 bikini    PILONIDAL CYST EXCISION      MI HYSTEROSCOPY,W/ENDO BX N/A 10/18/2017    Procedure: HYSTEROSCOPY AND FRACTIONAL DILATATION AND CURRETTAGE;  Surgeon: Kristi Mckenna MD;  [facility] MA[facility] OR;  Service: Gynecology    TUBAL LIGATION  2016    WISDOM TOOTH EXTRACTION       Family History   Problem Relation Age of Onset    Cancer Mother         skin , gallbladder    Liver disease Mother     Hypertension Father     Heart disease Sister     Cancer Brother         skin    No Known Problems Daughter     No Known Problems Son     No Known Problems Son     Colon cancer Maternal Uncle     Arthritis Family     Lung cancer Family     Uterine cancer Family       Social History   History   Alcohol Use    Yes     Comment: occ     History   Drug Use No     History   Smoking Status    Former Smoker    Packs/day: 0 50    Years: 20 00    Quit date:    Smokeless Tobacco    Never Used       Medications:     Current Outpatient Prescriptions:     cholecalciferol (VITAMIN D3) 1,000 units tablet, Take 1,000 Units by mouth daily, Disp: , Rfl:     hydroxychloroquine (PLAQUENIL) 200 mg tablet, Take 200 mg by mouth 2 (two) times a day, Disp: , Rfl: 0    omeprazole (PriLOSEC) 20 mg delayed release capsule, Take 20 mg by mouth as needed, Disp: , Rfl:     SUMAtriptan (IMITREX) 100 mg tablet, , Disp: , Rfl: 0    gabapentin (NEURONTIN) 300 mg capsule, Take 1 capsule (300 mg total) by mouth 2 (two) times a day for 30 days, Disp: 60 capsule, Rfl: 0    moxifloxacin (AVELOX) 400 MG tablet, Take 1 tablet (400 mg total) by mouth daily for 7 days, Disp: 7 tablet, Rfl: 0    naproxen (NAPROSYN) 500 mg tablet, Take 1 tablet (500 mg total) by mouth 2 (two) times a day with meals for 20 days, Disp: 40 tablet, Rfl: 0    No Known Allergies    OBJECTIVE  Vitals:   Vitals:    09/27/18 1026   BP: 110/68   BP Location: Left arm   Patient Position: Sitting   Cuff Size: Standard   Pulse: 70   Resp: (!) 116   Temp: (!) 97 4 °F (36 3 °C)   Weight: 85 1 kg (187 lb 9 6 oz)   Height: 5' 5" (1 651 m)         Physical Exam   Constitutional: She is oriented to person, place, and time  Vital signs are normal  She appears well-developed and well-nourished  HENT:   Head: Normocephalic and atraumatic  Right Ear: Tympanic membrane and ear canal normal  No swelling or tenderness  Tympanic membrane is not bulging  Decreased hearing is noted  Left Ear: Hearing normal  There is swelling (Of the left ear canal) and tenderness  Tympanic membrane is bulging  Mouth/Throat: Posterior oropharyngeal erythema present  No oropharyngeal exudate  Eyes: Pupils are equal, round, and reactive to light  Conjunctivae and EOM are normal    Neck: Normal range of motion  Neck supple  Cardiovascular: Normal rate, regular rhythm, S1 normal, S2 normal, normal heart sounds and intact distal pulses  No murmur heard    Pulmonary/Chest: Effort normal and breath sounds normal  No respiratory distress  She has no wheezes  Abdominal: Soft  Bowel sounds are normal  She exhibits no distension  There is no tenderness  Musculoskeletal: Normal range of motion  She exhibits no edema  Neurological: She is alert and oriented to person, place, and time  She has normal strength  Skin: Skin is warm  No rash noted  Psychiatric: She has a normal mood and affect  Her speech is normal and behavior is normal  Judgment and thought content normal    Vitals reviewed         Leela Sutton MD,   [facility]  9/27/2018

## 2018-09-28 DIAGNOSIS — M72.2 PLANTAR FASCIITIS: ICD-10-CM

## 2018-09-28 RX ORDER — NAPROXEN 500 MG/1
500 TABLET ORAL 2 TIMES DAILY WITH MEALS
Qty: 60 TABLET | Refills: 1 | Status: SHIPPED | OUTPATIENT
Start: 2018-09-28 | End: 2018-12-14 | Stop reason: SDUPTHER

## 2018-10-02 ENCOUNTER — OFFICE VISIT (OUTPATIENT)
Dept: FAMILY MEDICINE CLINIC | Facility: CLINIC | Age: 43
End: 2018-10-02
Payer: COMMERCIAL

## 2018-10-02 VITALS
WEIGHT: 188.4 LBS | HEIGHT: 65 IN | TEMPERATURE: 97.8 F | DIASTOLIC BLOOD PRESSURE: 68 MMHG | RESPIRATION RATE: 16 BRPM | BODY MASS INDEX: 31.39 KG/M2 | SYSTOLIC BLOOD PRESSURE: 118 MMHG

## 2018-10-02 DIAGNOSIS — J32.9 SINUSITIS, UNSPECIFIED CHRONICITY, UNSPECIFIED LOCATION: ICD-10-CM

## 2018-10-02 DIAGNOSIS — J40 BRONCHITIS: Primary | ICD-10-CM

## 2018-10-02 PROCEDURE — 99213 OFFICE O/P EST LOW 20 MIN: CPT | Performed by: FAMILY MEDICINE

## 2018-10-02 RX ORDER — ALBUTEROL SULFATE 90 UG/1
2 AEROSOL, METERED RESPIRATORY (INHALATION) EVERY 4 HOURS PRN
Qty: 8.5 G | Refills: 0 | Status: SHIPPED | OUTPATIENT
Start: 2018-10-02 | End: 2022-06-17

## 2018-10-02 RX ORDER — MOXIFLOXACIN HYDROCHLORIDE 400 MG/1
400 TABLET ORAL DAILY
Qty: 7 TABLET | Refills: 0 | Status: SHIPPED | OUTPATIENT
Start: 2018-10-02 | End: 2018-10-09

## 2018-10-02 NOTE — PROGRESS NOTES
Nathan Robles 1975 female MRN: [id number]    FAMILY PRACTICE ACUTE OFFICE VISIT  [facility] - [address]      ASSESSMENT/PLAN  Nathan Robles is a 37 y o  female presents to the office for    Diagnoses and all orders for this visit:    Bronchitis  - mixed picture with sinus infection, and bronchitis  Treatment below  -     albuterol (PROAIR HFA) 90 mcg/act inhaler; Inhale 2 puffs every 4 (four) hours as needed for wheezing    Sinusitis, unspecified chronicity, unspecified location  -     moxifloxacin (AVELOX) 400 MG tablet; Take 1 tablet (400 mg total) by mouth daily for 7 days  -     albuterol (PROAIR HFA) 90 mcg/act inhaler; Inhale 2 puffs every 4 (four) hours as needed for wheezing        Disposition:RTC in 2 week with PCP if symptoms don't improve    No future appointments  SUBJECTIVE  CC: Cough      HPI:  Nathan Robles is a 37 y o  female who presents for symptoms persisting  Worsening cough, congestion, headache sinus pressure  She only had 3 tablets of moxifloxacin she stated that her daughter spilled the bottle in the toilet  She has needed inhaler in the past but doesn't have hx of asthma  No fever  Her son now has the same symptoms and b/l ear infection  Review of Systems   Constitutional: Positive for fatigue  Negative for activity change, appetite change, chills and fever  HENT: Positive for congestion, sinus pressure and sore throat  Eyes: Negative for visual disturbance  Respiratory: Positive for cough  Negative for chest tightness and shortness of breath  Cardiovascular: Negative for chest pain and leg swelling  Gastrointestinal: Negative for abdominal distention, abdominal pain, constipation, diarrhea, nausea and vomiting  Allergic/Immunologic: Negative for environmental allergies  Neurological: Negative for dizziness, light-headedness and headaches  All other systems reviewed and are negative        Historical Information   The patient history was reviewed as follows:  Past Medical History:   Diagnosis Date    Acquired ankle/foot deformity     last assessed: 2017    Anxiety     Chronic pain disorder     right foot plantar    Dermatomycosis 2008    Dysfunctional uterine bleeding     Last assessed: 2018    Headache     Lupus     Mass of knee     last assessed: 2014    Mild acid reflux     Paronychia of finger     last assessed: 2013    Paronychia of toe     last asssessed: 2016   right foot    Patellofemoral dysfunction     last assessed: 3/21/2014    PCOS (polycystic ovarian syndrome) 12/15/2007    Pes planus, congenital     last assessed: 2017    Plantar fasciitis of right foot     Plantar fibromatosis     last assessed: 2017    PONV (postoperative nausea and vomiting)     Trichomoniasis     last assessed: 2015    Vaginal candidiasis     last assessed: 2016    Varicose veins of lower extremity     last assessed: 3/5/2014    Vertigo     last assessed: 3/5/2014    Viral gastroenteritis     last assessed: 2015         Past Surgical History:   Procedure Laterality Date    BACK SURGERY      discectomy lumbar     SECTION      x2 bikini    PILONIDAL CYST EXCISION      NC HYSTEROSCOPY,W/ENDO BX N/A 10/18/2017    Procedure: HYSTEROSCOPY AND FRACTIONAL DILATATION AND CURRETTAGE;  Surgeon: Parris Flores MD;  [facility] MAIN OR;  Service: Gynecology    TUBAL LIGATION  2016    WISDOM TOOTH EXTRACTION       Family History   Problem Relation Age of Onset    Cancer Mother         skin , gallbladder    Liver disease Mother     Hypertension Father     Heart disease Sister     Cancer Brother         skin    No Known Problems Daughter     No Known Problems Son     No Known Problems Son     Colon cancer Maternal Uncle     Arthritis Family     Lung cancer Family     Uterine cancer Family       Social History   History   Alcohol Use    Yes     Comment: occ     History   Drug Use No     History   Smoking Status    Former Smoker    Packs/day: 0 50    Years: 20 00    Quit date: 2010   Smokeless Tobacco    Never Used       Medications:     Current Outpatient Prescriptions:     cholecalciferol (VITAMIN D3) 1,000 units tablet, Take 1,000 Units by mouth daily, Disp: , Rfl:     hydroxychloroquine (PLAQUENIL) 200 mg tablet, Take 200 mg by mouth 2 (two) times a day, Disp: , Rfl: 0    moxifloxacin (AVELOX) 400 MG tablet, Take 1 tablet (400 mg total) by mouth daily for 7 days, Disp: 7 tablet, Rfl: 0    naproxen (NAPROSYN) 500 mg tablet, Take 1 tablet (500 mg total) by mouth 2 (two) times a day with meals for 60 days, Disp: 60 tablet, Rfl: 1    omeprazole (PriLOSEC) 20 mg delayed release capsule, Take 20 mg by mouth as needed, Disp: , Rfl:     SUMAtriptan (IMITREX) 100 mg tablet, Take 1 tablet (100 mg total) by mouth once as needed for migraine for up to 1 dose Within 30 min onset of headache, Disp: 9 tablet, Rfl: 1    albuterol (PROAIR HFA) 90 mcg/act inhaler, Inhale 2 puffs every 4 (four) hours as needed for wheezing, Disp: 8 5 g, Rfl: 0    gabapentin (NEURONTIN) 300 mg capsule, Take 1 capsule (300 mg total) by mouth 2 (two) times a day for 30 days, Disp: 60 capsule, Rfl: 0    No Known Allergies    OBJECTIVE  Vitals:   Vitals:    10/02/18 1454   BP: 118/68   BP Location: Left arm   Patient Position: Sitting   Cuff Size: Standard   Resp: 16   Temp: 97 8 °F (36 6 °C)   Weight: 85 5 kg (188 lb 6 4 oz)   Height: 5' 5" (1 651 m)         Physical Exam   Constitutional: She is oriented to person, place, and time  Vital signs are normal  She appears well-developed and well-nourished  HENT:   Head: Normocephalic and atraumatic  Right Ear: Hearing and external ear normal  Tympanic membrane is bulging  Left Ear: Hearing and external ear normal  Tympanic membrane is bulging  Nose: Mucosal edema present  Mouth/Throat: Posterior oropharyngeal erythema present  Eyes: Pupils are equal, round, and reactive to light  Conjunctivae and EOM are normal    Neck: Normal range of motion  Neck supple  Cardiovascular: Normal rate, regular rhythm, S1 normal, S2 normal, normal heart sounds and intact distal pulses  No murmur heard  Pulmonary/Chest: Effort normal  No respiratory distress  She has wheezes (right lobe, on exertion  )  Abdominal: Soft  Bowel sounds are normal  She exhibits no distension  There is no tenderness  Musculoskeletal: Normal range of motion  She exhibits no edema  Neurological: She is alert and oriented to person, place, and time  She has normal strength  Skin: Skin is warm  No rash noted  Psychiatric: She has a normal mood and affect  Her speech is normal and behavior is normal  Judgment and thought content normal    Vitals reviewed           Dulce Dooley MD,   [facility]  10/2/2018

## 2018-10-09 ENCOUNTER — OFFICE VISIT (OUTPATIENT)
Dept: FAMILY MEDICINE CLINIC | Facility: CLINIC | Age: 43
End: 2018-10-09
Payer: COMMERCIAL

## 2018-10-09 VITALS
DIASTOLIC BLOOD PRESSURE: 58 MMHG | TEMPERATURE: 98.6 F | BODY MASS INDEX: 31.28 KG/M2 | SYSTOLIC BLOOD PRESSURE: 102 MMHG | WEIGHT: 188 LBS | HEART RATE: 72 BPM | RESPIRATION RATE: 12 BRPM

## 2018-10-09 DIAGNOSIS — Z71.3 WEIGHT LOSS COUNSELING, ENCOUNTER FOR: ICD-10-CM

## 2018-10-09 DIAGNOSIS — R05.8 POST-VIRAL COUGH SYNDROME: Primary | ICD-10-CM

## 2018-10-09 PROCEDURE — 99214 OFFICE O/P EST MOD 30 MIN: CPT | Performed by: NURSE PRACTITIONER

## 2018-10-09 RX ORDER — PROMETHAZINE HYDROCHLORIDE AND CODEINE PHOSPHATE 6.25; 1 MG/5ML; MG/5ML
5 SYRUP ORAL
Qty: 120 ML | Refills: 0 | Status: SHIPPED | OUTPATIENT
Start: 2018-10-09 | End: 2019-01-21

## 2018-10-09 RX ORDER — BENZONATATE 200 MG/1
200 CAPSULE ORAL 3 TIMES DAILY PRN
Qty: 30 CAPSULE | Refills: 3 | Status: SHIPPED | OUTPATIENT
Start: 2018-10-09 | End: 2022-01-25 | Stop reason: SDUPTHER

## 2018-10-09 RX ORDER — FLUTICASONE PROPIONATE 110 UG/1
1 AEROSOL, METERED RESPIRATORY (INHALATION) 2 TIMES DAILY
Qty: 1 INHALER | Refills: 0 | Status: SHIPPED | OUTPATIENT
Start: 2018-10-09

## 2018-10-09 NOTE — PATIENT INSTRUCTIONS
Weight Management   AMBULATORY CARE:   Why it is important to manage your weight:  Being overweight increases your risk of health conditions such as heart disease, high blood pressure, type 2 diabetes, and certain types of cancer  It can also increase your risk for osteoarthritis, sleep apnea, and other respiratory problems  Aim for a slow, steady weight loss  Even a small amount of weight loss can lower your risk of health problems  How to lose weight safely:  A safe and healthy way to lose weight is to eat fewer calories and get regular exercise  You can lose up about 1 pound a week by decreasing the number of calories you eat by 500 calories each day  You can decrease calories by eating smaller portion sizes or by cutting out high-calorie foods  Read labels to find out how many calories are in the foods you eat  You can also burn calories with exercise such as walking, swimming, or biking  You will be more likely to keep weight off if you make these changes part of your lifestyle  Healthy meal plan for weight management:  A healthy meal plan includes a variety of foods, contains fewer calories, and helps you stay healthy  A healthy meal plan includes the following:  · Eat whole-grain foods more often  A healthy meal plan should contain fiber  Fiber is the part of grains, fruits, and vegetables that is not broken down by your body  Whole-grain foods are healthy and provide extra fiber in your diet  Some examples of whole-grain foods are whole-wheat breads and pastas, oatmeal, brown rice, and bulgur  · Eat a variety of vegetables every day  Include dark, leafy greens such as spinach, kale, matias greens, and mustard greens  Eat yellow and orange vegetables such as carrots, sweet potatoes, and winter squash  · Eat a variety of fruits every day  Choose fresh or canned fruit (canned in its own juice or light syrup) instead of juice  Fruit juice has very little or no fiber  · Eat low-fat dairy foods  Drink fat-free (skim) milk or 1% milk  Eat fat-free yogurt and low-fat cottage cheese  Try low-fat cheeses such as mozzarella and other reduced-fat cheeses  · Choose meat and other protein foods that are low in fat  Choose beans or other legumes such as split peas or lentils  Choose fish, skinless poultry (chicken or turkey), or lean cuts of red meat (beef or pork)  Before you cook meat or poultry, cut off any visible fat  · Use less fat and oil  Try baking foods instead of frying them  Add less fat, such as margarine, sour cream, regular salad dressing and mayonnaise to foods  Eat fewer high-fat foods  Some examples of high-fat foods include french fries, doughnuts, ice cream, and cakes  · Eat fewer sweets  Limit foods and drinks that are high in sugar  This includes candy, cookies, regular soda, and sweetened drinks  Ways to decrease calories:   · Eat smaller portions  ¨ Use a small plate with smaller servings  ¨ Do not eat second helpings  ¨ When you eat at a restaurant, ask for a box and place half of your meal in the box before you eat  ¨ Share an entrée with someone else  · Replace high-calorie snacks with healthy, low-calorie snacks  ¨ Choose fresh fruit, vegetables, fat-free rice cakes, or air-popped popcorn instead of potato chips, nuts, or chocolate  ¨ Choose water or calorie-free drinks instead of soda or sweetened drinks  · Eat regular meals  Skipping meals can lead to overeating later in the day  Eat a healthy snack in place of a meal if you do not have time to eat a regular meal      · Do not shop for groceries when you are hungry  You may be more likely to make unhealthy food choices  Take a grocery list of healthy foods and shop after you have eaten  Exercise:  Exercise at least 30 minutes per day on most days of the week  Some examples of exercise include walking, biking, dancing, and swimming   You can also fit in more physical activity by taking the stairs instead of the elevator or parking farther away from stores  Ask your healthcare provider about the best exercise plan for you  Other things to consider as you try to lose weight:   · Be aware of situations that may give you the urge to overeat, such as eating while watching television  Find ways to avoid these situations  For example, read a book, go for a walk, or do crafts  · Meet with a weight loss support group or friends who are also trying to lose weight  This may help you stay motivated to continue working on your weight loss goals  © 2017 [address] [facility] Information is for End User's use only and may not be sold, redistributed or otherwise used for commercial purposes  All illustrations and images included in CareNotes® are the copyrighted property of A [employer]  or [employer]  The above information is an  only  It is not intended as medical advice for individual conditions or treatments  Talk to your doctor, nurse or pharmacist before following any medical regimen to see if it is safe and effective for you  Acute Cough   AMBULATORY CARE:   An acute cough  can last up to 3 weeks  Common causes of an acute cough include a cold, allergies, or a lung infection  Seek care immediately if:   · You have trouble breathing or feel short of breath  · You cough up blood, or you see blood in your mucus  · You faint or feel weak or dizzy  · You have chest pain when you cough or take a deep breath  · You have new wheezing  Contact your healthcare provider if:   · You have a fever  · Your cough lasts longer than 4 weeks  · Your symptoms do not improve with treatment  · You have questions or concerns about your condition or care  Treatment:  An acute cough usually goes away on its own  Ask your healthcare provider about medicines you can take to decrease your cough   You may need medicine to stop the cough, decrease swelling in your airways, or help open your airways  Medicine may also be given to help you cough up mucus  If you have an infection caused by bacteria, you may need antibiotics  Manage your symptoms:   · Do not smoke and stay away from others who smoke  Nicotine and other chemicals in cigarettes and cigars can cause lung damage and make your cough worse  Ask your healthcare provider for information if you currently smoke and need help to quit  E-cigarettes or smokeless tobacco still contain nicotine  Talk to your healthcare provider before you use these products  · Drink extra liquids as directed  Liquids will help thin and loosen mucus so you can cough it up  Liquids will also help prevent dehydration  Examples of good liquids to drink include water, fruit juice, and broth  Do not drink liquids that contain caffeine  Caffeine can increase your risk for dehydration  Ask your healthcare provider how much liquid to drink each day  · Rest as directed  Do not do activities that make your cough worse, such as exercise  · Use a humidifier or vaporizer  Use a cool mist humidifier or a vaporizer to increase air moisture in your home  This may make it easier for you to breathe and help decrease your cough  · Eat 2 to 5 mL of honey 2 times each day  Honey can help thin mucus and decrease your cough  · Use cough drops or lozenges  These can help decrease throat irritation and your cough  Follow up with your healthcare provider as directed:  Write down your questions so you remember to ask them during your visits  © 2017 [address] [employer] Information is for End User's use only and may not be sold, redistributed or otherwise used for commercial purposes  All illustrations and images included in CareNotes® are the copyrighted property of A D A M [employer] Inc  or [employer]  The above information is an  only  It is not intended as medical advice for individual conditions or treatments   Talk to your doctor, nurse or pharmacist before following any medical regimen to see if it is safe and effective for you

## 2018-10-09 NOTE — PROGRESS NOTES
Assessment/Plan:    Problem List Items Addressed This Visit     None      Visit Diagnoses     Post-viral cough syndrome    -  Primary    Weight loss counseling, encounter for        BMI 31 0-31 9,adult            Cough  No evidence of bacterial infection on exam  This is consistent with post viral cough  Supportive care for now    Obesity  I assessed Bryce Toro to be in a contemplative stage with respect to weight loss  General weight loss/lifestyle modification strategies discussed (elicit support from others; identify saboteurs; non-food rewards, etc)  Diet interventions: diet diary indefinitely, moderate (500 kCal/d) deficit diet and referral to dietitian for guidance in these changes  Informal exercise measures discussed, e g  taking stairs instead of elevator  Regular aerobic exercise program discussed  Follow up in: 4 weeks and as needed       Patient Instructions   Weight Management   AMBULATORY CARE:   Why it is important to manage your weight:  Being overweight increases your risk of health conditions such as heart disease, high blood pressure, type 2 diabetes, and certain types of cancer  It can also increase your risk for osteoarthritis, sleep apnea, and other respiratory problems  Aim for a slow, steady weight loss  Even a small amount of weight loss can lower your risk of health problems  How to lose weight safely:  A safe and healthy way to lose weight is to eat fewer calories and get regular exercise  You can lose up about 1 pound a week by decreasing the number of calories you eat by 500 calories each day  You can decrease calories by eating smaller portion sizes or by cutting out high-calorie foods  Read labels to find out how many calories are in the foods you eat  You can also burn calories with exercise such as walking, swimming, or biking  You will be more likely to keep weight off if you make these changes part of your lifestyle     Healthy meal plan for weight management:  A healthy meal plan includes a variety of foods, contains fewer calories, and helps you stay healthy  A healthy meal plan includes the following:  · Eat whole-grain foods more often  A healthy meal plan should contain fiber  Fiber is the part of grains, fruits, and vegetables that is not broken down by your body  Whole-grain foods are healthy and provide extra fiber in your diet  Some examples of whole-grain foods are whole-wheat breads and pastas, oatmeal, brown rice, and bulgur  · Eat a variety of vegetables every day  Include dark, leafy greens such as spinach, kale, matias greens, and mustard greens  Eat yellow and orange vegetables such as carrots, sweet potatoes, and winter squash  · Eat a variety of fruits every day  Choose fresh or canned fruit (canned in its own juice or light syrup) instead of juice  Fruit juice has very little or no fiber  · Eat low-fat dairy foods  Drink fat-free (skim) milk or 1% milk  Eat fat-free yogurt and low-fat cottage cheese  Try low-fat cheeses such as mozzarella and other reduced-fat cheeses  · Choose meat and other protein foods that are low in fat  Choose beans or other legumes such as split peas or lentils  Choose fish, skinless poultry (chicken or turkey), or lean cuts of red meat (beef or pork)  Before you cook meat or poultry, cut off any visible fat  · Use less fat and oil  Try baking foods instead of frying them  Add less fat, such as margarine, sour cream, regular salad dressing and mayonnaise to foods  Eat fewer high-fat foods  Some examples of high-fat foods include french fries, doughnuts, ice cream, and cakes  · Eat fewer sweets  Limit foods and drinks that are high in sugar  This includes candy, cookies, regular soda, and sweetened drinks  Ways to decrease calories:   · Eat smaller portions  ¨ Use a small plate with smaller servings  ¨ Do not eat second helpings      ¨ When you eat at a restaurant, ask for a box and place half of your meal in the box before you eat  ¨ Share an entrée with someone else  · Replace high-calorie snacks with healthy, low-calorie snacks  ¨ Choose fresh fruit, vegetables, fat-free rice cakes, or air-popped popcorn instead of potato chips, nuts, or chocolate  ¨ Choose water or calorie-free drinks instead of soda or sweetened drinks  · Eat regular meals  Skipping meals can lead to overeating later in the day  Eat a healthy snack in place of a meal if you do not have time to eat a regular meal      · Do not shop for groceries when you are hungry  You may be more likely to make unhealthy food choices  Take a grocery list of healthy foods and shop after you have eaten  Exercise:  Exercise at least 30 minutes per day on most days of the week  Some examples of exercise include walking, biking, dancing, and swimming  You can also fit in more physical activity by taking the stairs instead of the elevator or parking farther away from stores  Ask your healthcare provider about the best exercise plan for you  Other things to consider as you try to lose weight:   · Be aware of situations that may give you the urge to overeat, such as eating while watching television  Find ways to avoid these situations  For example, read a book, go for a walk, or do crafts  · Meet with a weight loss support group or friends who are also trying to lose weight  This may help you stay motivated to continue working on your weight loss goals  © 2017 [employer] Information is for End User's use only and may not be sold, redistributed or otherwise used for commercial purposes  All illustrations and images included in CareNotes® are the copyrighted property of A [employer]  or [employer]  The above information is an  only  It is not intended as medical advice for individual conditions or treatments   Talk to your doctor, nurse or pharmacist before following any medical regimen to see if it is safe and effective for you  Acute Cough   AMBULATORY CARE:   An acute cough  can last up to 3 weeks  Common causes of an acute cough include a cold, allergies, or a lung infection  Seek care immediately if:   · You have trouble breathing or feel short of breath  · You cough up blood, or you see blood in your mucus  · You faint or feel weak or dizzy  · You have chest pain when you cough or take a deep breath  · You have new wheezing  Contact your healthcare provider if:   · You have a fever  · Your cough lasts longer than 4 weeks  · Your symptoms do not improve with treatment  · You have questions or concerns about your condition or care  Treatment:  An acute cough usually goes away on its own  Ask your healthcare provider about medicines you can take to decrease your cough  You may need medicine to stop the cough, decrease swelling in your airways, or help open your airways  Medicine may also be given to help you cough up mucus  If you have an infection caused by bacteria, you may need antibiotics  Manage your symptoms:   · Do not smoke and stay away from others who smoke  Nicotine and other chemicals in cigarettes and cigars can cause lung damage and make your cough worse  Ask your healthcare provider for information if you currently smoke and need help to quit  E-cigarettes or smokeless tobacco still contain nicotine  Talk to your healthcare provider before you use these products  · Drink extra liquids as directed  Liquids will help thin and loosen mucus so you can cough it up  Liquids will also help prevent dehydration  Examples of good liquids to drink include water, fruit juice, and broth  Do not drink liquids that contain caffeine  Caffeine can increase your risk for dehydration  Ask your healthcare provider how much liquid to drink each day  · Rest as directed  Do not do activities that make your cough worse, such as exercise  · Use a humidifier or vaporizer  Use a cool mist humidifier or a vaporizer to increase air moisture in your home  This may make it easier for you to breathe and help decrease your cough  · Eat 2 to 5 mL of honey 2 times each day  Honey can help thin mucus and decrease your cough  · Use cough drops or lozenges  These can help decrease throat irritation and your cough  Follow up with your healthcare provider as directed:  Write down your questions so you remember to ask them during your visits  © 2017 [employer]  Information is for End User's use only and may not be sold, redistributed or otherwise used for commercial purposes  All illustrations and images included in CareNotes® are the copyrighted property of A D A M , Inc  or [employer]  The above information is an  only  It is not intended as medical advice for individual conditions or treatments  Talk to your doctor, nurse or pharmacist before following any medical regimen to see if it is safe and effective for you  Return in about 4 weeks (around 11/6/2018)  Subjective:      Patient ID: Josephine Gordon is a 37 y o  female  Chief Complaint   Patient presents with    Follow-up     cough not feeling better         Josephine Gordon is a 37 y o  female here for discussion regarding weight loss  She has noted a weight gain of approximately 13 pounds over the last 6 months  She feels ideal weight is 145 pounds  History of eating disorders: none  There is a family history positive for obesity in the patient, mother and father  Previous treatments for obesity include OTC appetite suppressants:   and diet modification  Obesity associated medical conditions: none  Obesity associated medications: none  Cardiovascular risk factors besides obesity: obesity (BMI >= 30 kg/m2), sedentary lifestyle and smoking/ tobacco exposure  Has three young children which limits her ability to exercise   "I don't have time"          Cough   Chronicity: persistent  The current episode started 1 to 4 weeks ago (since URI)  The problem has been waxing and waning  Episode frequency: worse at night  The cough is non-productive  Pertinent negatives include no chills, ear congestion, fever, headaches, heartburn, myalgias, nasal congestion, postnasal drip, sore throat, shortness of breath or wheezing  The symptoms are aggravated by lying down  Risk factors for lung disease include smoking/tobacco exposure  She has tried prescription cough suppressant and a beta-agonist inhaler for the symptoms  The treatment provided moderate relief  Her past medical history is significant for environmental allergies  There is no history of COPD  The following portions of the patient's history were reviewed and updated as appropriate: allergies, current medications, past family history, past medical history, past social history, past surgical history and problem list     Review of Systems   Constitutional: Negative for chills, fever and unexpected weight change  HENT: Negative for postnasal drip, sinus pressure and sore throat  Respiratory: Positive for cough  Negative for shortness of breath and wheezing  Cardiovascular: Negative  Gastrointestinal: Negative  Negative for heartburn  Musculoskeletal: Negative for myalgias  Allergic/Immunologic: Positive for environmental allergies  Neurological: Negative for dizziness, weakness and headaches           Current Outpatient Prescriptions   Medication Sig Dispense Refill    cholecalciferol (VITAMIN D3) 1,000 units tablet Take 1,000 Units by mouth daily      gabapentin (NEURONTIN) 300 mg capsule Take 1 capsule (300 mg total) by mouth 2 (two) times a day for 30 days 60 capsule 0    hydroxychloroquine (PLAQUENIL) 200 mg tablet Take 200 mg by mouth 2 (two) times a day  0    naproxen (NAPROSYN) 500 mg tablet Take 1 tablet (500 mg total) by mouth 2 (two) times a day with meals for 60 days 60 tablet 1    omeprazole (PriLOSEC) 20 mg delayed release capsule Take 20 mg by mouth as needed      SUMAtriptan (IMITREX) 100 mg tablet Take 1 tablet (100 mg total) by mouth once as needed for migraine for up to 1 dose Within 30 min onset of headache 9 tablet 1    albuterol (PROAIR HFA) 90 mcg/act inhaler Inhale 2 puffs every 4 (four) hours as needed for wheezing (Patient not taking: Reported on 10/9/2018 ) 8 5 g 0    moxifloxacin (AVELOX) 400 MG tablet Take 1 tablet (400 mg total) by mouth daily for 7 days (Patient not taking: Reported on 10/9/2018 ) 7 tablet 0     No current facility-administered medications for this visit  Objective:    /58 (BP Location: Right arm, Patient Position: Sitting, Cuff Size: Adult)   Pulse 72   Temp 98 6 °F (37 °C) (Temporal)   Resp 12   Wt 85 3 kg (188 lb)   BMI 31 28 kg/m²        Physical Exam   Constitutional: She is oriented to person, place, and time  Vital signs are normal  She appears well-developed and well-nourished  No distress  HENT:   Nasal mucosa pink    TMs dull    +mod post nasal drip   Cardiovascular: Normal rate, regular rhythm, normal heart sounds and intact distal pulses  Pulmonary/Chest: Effort normal and breath sounds normal  No respiratory distress  Musculoskeletal: She exhibits no edema  Lymphadenopathy:     She has no cervical adenopathy  Neurological: She is alert and oriented to person, place, and time  Coordination normal    Skin: Skin is warm and dry  No pallor  Psychiatric: She has a normal mood and affect  Vitals reviewed               Sebastián Jones, [facility]

## 2018-10-19 ENCOUNTER — OFFICE VISIT (OUTPATIENT)
Dept: PODIATRY | Facility: CLINIC | Age: 43
End: 2018-10-19
Payer: COMMERCIAL

## 2018-10-19 VITALS
RESPIRATION RATE: 17 BRPM | HEIGHT: 65 IN | DIASTOLIC BLOOD PRESSURE: 85 MMHG | BODY MASS INDEX: 31.32 KG/M2 | SYSTOLIC BLOOD PRESSURE: 124 MMHG | HEART RATE: 71 BPM | WEIGHT: 188 LBS

## 2018-10-19 DIAGNOSIS — G57.51 TARSAL TUNNEL SYNDROME OF RIGHT SIDE: ICD-10-CM

## 2018-10-19 DIAGNOSIS — M79.671 PAIN IN BOTH FEET: ICD-10-CM

## 2018-10-19 DIAGNOSIS — M79.672 PAIN IN BOTH FEET: ICD-10-CM

## 2018-10-19 DIAGNOSIS — M54.16 RADICULOPATHY OF LUMBAR REGION: ICD-10-CM

## 2018-10-19 DIAGNOSIS — M72.2 PLANTAR FASCIITIS: Primary | ICD-10-CM

## 2018-10-19 PROCEDURE — 99213 OFFICE O/P EST LOW 20 MIN: CPT | Performed by: PODIATRIST

## 2018-10-19 RX ORDER — OXYCODONE HYDROCHLORIDE AND ACETAMINOPHEN 5; 325 MG/1; MG/1
TABLET ORAL
Qty: 14 TABLET | Refills: 0 | Status: SHIPPED | OUTPATIENT
Start: 2018-10-19 | End: 2018-10-26

## 2018-10-19 RX ORDER — GABAPENTIN 600 MG/1
600 TABLET ORAL 2 TIMES DAILY
Qty: 60 TABLET | Refills: 0 | Status: SHIPPED | OUTPATIENT
Start: 2018-10-19 | End: 2019-01-21

## 2018-10-19 NOTE — PROGRESS NOTES
Assessment/Plan:   Metatarsalgia  Plantar fasciitis  Rule out radiculopathy  Rule out tarsal tunnel syndrome    Plan  Will increase gabapentin dosing  Physical therapy has been ordered  Patient needs MRI  This will be reordered  Patient is requesting narcotic  We will add this   Diagnoses and all orders for this visit:    Plantar fasciitis    Pain in both feet  -     gabapentin (NEURONTIN) 600 MG tablet; Take 1 tablet (600 mg total) by mouth 2 (two) times a day for 30 days    Radiculopathy of lumbar region  -     gabapentin (NEURONTIN) 600 MG tablet; Take 1 tablet (600 mg total) by mouth 2 (two) times a day for 30 days    Tarsal tunnel syndrome of right side          Subjective:  Patient is continuing and ongoing pain of the foot  She has pain in the right foot  She has low back pain  Patient did not get MRI  She did not attend physical therapy  Patient ID: Jesus Sheets is a 37 y o  female  Past Medical History:   Diagnosis Date    Acquired ankle/foot deformity     last assessed: 8/29/2017    Anxiety     Chronic pain disorder     right foot plantar    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    Dysfunctional uterine bleeding     Last assessed: 1/9/2018    Headache     Lupus     Mass of knee     last assessed: 5/7/2014    Mild acid reflux     Paronychia of finger     last assessed: 11/20/2013    Paronychia of toe     last asssessed: 9/28/2016   right foot    Patellofemoral dysfunction     last assessed: 3/21/2014    PCOS (polycystic ovarian syndrome) 12/15/2007    Pes planus, congenital     last assessed: 8/29/2017    Plantar fasciitis of right foot     Plantar fibromatosis     last assessed: 9/28/2017    PONV (postoperative nausea and vomiting)     Trichomoniasis     last assessed: 1/16/2015    Vaginal candidiasis     last assessed: 11/30/2016    Varicose veins of lower extremity     last assessed: 3/5/2014    Vertigo     last assessed: 3/5/2014    Viral gastroenteritis     last assessed: 2015       Past Surgical History:   Procedure Laterality Date    BACK SURGERY      discectomy lumbar     SECTION      x2 bikini    PILONIDAL CYST EXCISION      [place] HYSTEROSCOPY,W/ENDO BX N/A 10/18/2017    Procedure: HYSTEROSCOPY AND FRACTIONAL DILATATION AND CURRETTAGE;  Surgeon: Janelle Smith MD;  [facility] MA[facility];  Service: Gynecology    TUBAL LIGATION  2016    WISDOM TOOTH EXTRACTION         No Known Allergies      Current Outpatient Prescriptions:     albuterol (PROAIR HFA) 90 mcg/act inhaler, Inhale 2 puffs every 4 (four) hours as needed for wheezing (Patient not taking: Reported on 10/9/2018 ), Disp: 8 5 g, Rfl: 0    benzonatate (TESSALON) 200 MG capsule, Take 1 capsule (200 mg total) by mouth 3 (three) times a day as needed for cough, Disp: 30 capsule, Rfl: 3    cholecalciferol (VITAMIN D3) 1,000 units tablet, Take 1,000 Units by mouth daily, Disp: , Rfl:     fluticasone (FLOVENT HFA) 110 MCG/ACT inhaler, Inhale 1 puff 2 (two) times a day Rinse mouth after use , Disp: 1 Inhaler, Rfl: 0    gabapentin (NEURONTIN) 300 mg capsule, Take 1 capsule (300 mg total) by mouth 2 (two) times a day for 30 days, Disp: 60 capsule, Rfl: 0    gabapentin (NEURONTIN) 600 MG tablet, Take 1 tablet (600 mg total) by mouth 2 (two) times a day for 30 days, Disp: 60 tablet, Rfl: 0    hydroxychloroquine (PLAQUENIL) 200 mg tablet, Take 200 mg by mouth 2 (two) times a day, Disp: , Rfl: 0    naproxen (NAPROSYN) 500 mg tablet, Take 1 tablet (500 mg total) by mouth 2 (two) times a day with meals for 60 days, Disp: 60 tablet, Rfl: 1    omeprazole (PriLOSEC) 20 mg delayed release capsule, Take 20 mg by mouth as needed, Disp: , Rfl:     promethazine-codeine (PHENERGAN WITH CODEINE) 6 25-10 mg/5 mL syrup, Take 5 mL by mouth daily at bedtime as needed for cough, Disp: 120 mL, Rfl: 0    SUMAtriptan (IMITREX) 100 mg tablet, Take 1 tablet (100 mg total) by mouth once as needed for migraine for up to 1 dose Within 30 min onset of headache, Disp: 9 tablet, Rfl: 1    Patient Active Problem List   Diagnosis    Plantar fasciitis    Pain in both feet    Congenital pes planus of right foot    Congenital pes planus of left foot    Radiculopathy of lumbar region    Difficulty walking    Tarsal tunnel syndrome of right side    Closed nondisplaced fracture of body of right calcaneus    Other forms of systemic lupus erythematosus ([address] )    Right foot pain    Common migraine without aura    Generalized anxiety disorder       HPI    The following portions of the patient's history were reviewed and updated as appropriate: allergies, current medications, past family history, past medical history, past social history, past surgical history and problem list     Review of Systems      Historical Information   Past Medical History:   Diagnosis Date    Acquired ankle/foot deformity     last assessed: 8/29/2017    Anxiety     Chronic pain disorder     right foot plantar    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    Dysfunctional uterine bleeding     Last assessed: 1/9/2018    Headache     Lupus     Mass of knee     last assessed: 5/7/2014    Mild acid reflux     Paronychia of finger     last assessed: 11/20/2013    Paronychia of toe     last asssessed: 9/28/2016   right foot    Patellofemoral dysfunction     last assessed: 3/21/2014    PCOS (polycystic ovarian syndrome) 12/15/2007    Pes planus, congenital     last assessed: 8/29/2017    Plantar fasciitis of right foot     Plantar fibromatosis     last assessed: 9/28/2017    PONV (postoperative nausea and vomiting)     Trichomoniasis     last assessed: 1/16/2015    Vaginal candidiasis     last assessed: 11/30/2016    Varicose veins of lower extremity     last assessed: 3/5/2014    Vertigo     last assessed: 3/5/2014    Viral gastroenteritis     last assessed: 6/4/2015     Past Surgical History:   Procedure Laterality Date    BACK SURGERY      discectomy lumbar     SECTION      x2 bikini    PILONIDAL CYST EXCISION      [place] HYSTEROSCOPY,W/ENDO BX N/A 10/18/2017    Procedure: HYSTEROSCOPY AND FRACTIONAL DILATATION AND CURRETTAGE;  Surgeon: Matilde Jones MD;  Location[facility] MAIN OR;  Service: Gynecology    TUBAL LIGATION  2016    WISDOM TOOTH EXTRACTION       Social History   History   Alcohol Use    Yes     Comment: occ     History   Drug Use No     Family History:   Family History   Problem Relation Age of Onset    Cancer Mother         skin , gallbladder    Liver disease Mother     Hypertension Father     Heart disease Sister     Cancer Brother         skin    No Known Problems Daughter     No Known Problems Son     No Known Problems Son     Colon cancer Maternal Uncle     Arthritis Family     Lung cancer Family     Uterine cancer Family        Meds/Allergies   No Known Allergies    Current Outpatient Prescriptions on File Prior to Visit   Medication Sig Dispense Refill    albuterol (PROAIR HFA) 90 mcg/act inhaler Inhale 2 puffs every 4 (four) hours as needed for wheezing (Patient not taking: Reported on 10/9/2018 ) 8 5 g 0    benzonatate (TESSALON) 200 MG capsule Take 1 capsule (200 mg total) by mouth 3 (three) times a day as needed for cough 30 capsule 3    cholecalciferol (VITAMIN D3) 1,000 units tablet Take 1,000 Units by mouth daily      fluticasone (FLOVENT HFA) 110 MCG/ACT inhaler Inhale 1 puff 2 (two) times a day Rinse mouth after use   1 Inhaler 0    gabapentin (NEURONTIN) 300 mg capsule Take 1 capsule (300 mg total) by mouth 2 (two) times a day for 30 days 60 capsule 0    hydroxychloroquine (PLAQUENIL) 200 mg tablet Take 200 mg by mouth 2 (two) times a day  0    naproxen (NAPROSYN) 500 mg tablet Take 1 tablet (500 mg total) by mouth 2 (two) times a day with meals for 60 days 60 tablet 1    omeprazole (PriLOSEC) 20 mg delayed release capsule Take 20 mg by mouth as needed      promethazine-codeine (PHENERGAN WITH CODEINE) 6 25-10 mg/5 mL syrup Take 5 mL by mouth daily at bedtime as needed for cough 120 mL 0    SUMAtriptan (IMITREX) 100 mg tablet Take 1 tablet (100 mg total) by mouth once as needed for migraine for up to 1 dose Within 30 min onset of headache 9 tablet 1     No current facility-administered medications on file prior to visit  Objective:  Patient's shoes and socks removed  Foot ExamPhysical Exam            Foot Exam     General  General Appearance: appears stated age and healthy   Orientation: alert and oriented to person, place, and time   Affect: appropriate   Gait: antalgic         Right Foot/Ankle      Inspection and Palpation  Ecchymosis: none  Tenderness: plantar fascia   Swelling: plantar fascia   Arch: pes planus  Hammertoes: fifth toe  Hallux valgus: no  Hallux limitus: yes  Skin Exam: skin intact;      Neurovascular  Dorsalis pedis: 3+  Posterior tibial: 3+  Superficial peroneal nerve sensation: diminished  Deep peroneal nerve sensation: diminished  Achilles reflex: 1+     Muscle Strength  Ankle dorsiflexion: 4+     Tests  PT Tinel's sign: negative    Too many toes: positive      Comments  Pain with palpation heel   4/5 L5 testing bilateral      Left Foot/Ankle       Inspection and Palpation  Ecchymosis: none  Tenderness: plantar fascia   Swelling: none   Arch: pes planus  Hammertoes: fifth toe  Hallux valgus: no  Hallux limitus: yes  Skin Exam: skin intact;      Neurovascular  Dorsalis pedis: 3+  Posterior tibial: 3+  Superficial peroneal nerve sensation: diminished  Deep peroneal nerve sensation: diminished  Achilles reflex: 1+     Muscle Strength  Ankle dorsiflexion: 4+     Tests  Too many toes: positive         Physical Exam   Cardiovascular:   Pulses:       Dorsalis pedis pulses are 3+ on the right side, and 3+ on the left side         Posterior tibial pulses are 3+ on the right side, and 3+ on the left side     Neurological:   Reflex Scores:       Achilles reflexes are 1+ on the right side and 1+ on the left side

## 2018-10-30 ENCOUNTER — EVALUATION (OUTPATIENT)
Dept: PHYSICAL THERAPY | Facility: CLINIC | Age: 43
End: 2018-10-30
Payer: COMMERCIAL

## 2018-10-30 DIAGNOSIS — M54.16 RADICULOPATHY OF LUMBAR REGION: ICD-10-CM

## 2018-10-30 DIAGNOSIS — M79.671 PAIN IN BOTH FEET: ICD-10-CM

## 2018-10-30 DIAGNOSIS — M72.2 PLANTAR FASCIITIS: Primary | ICD-10-CM

## 2018-10-30 DIAGNOSIS — M79.672 PAIN IN BOTH FEET: ICD-10-CM

## 2018-10-30 DIAGNOSIS — G57.51 TARSAL TUNNEL SYNDROME OF RIGHT SIDE: ICD-10-CM

## 2018-10-30 PROCEDURE — G8979 MOBILITY GOAL STATUS: HCPCS

## 2018-10-30 PROCEDURE — 97161 PT EVAL LOW COMPLEX 20 MIN: CPT

## 2018-10-30 PROCEDURE — G8978 MOBILITY CURRENT STATUS: HCPCS

## 2018-10-30 NOTE — PROGRESS NOTES
PT Evaluation     Today's date: 10/30/2018  Patient name: Linda Norman  : 1975  MRN: [id number]  Referring provider: Cheikh Ng DPM  Dx:   Encounter Diagnosis     ICD-10-CM    1  Plantar fasciitis M72 2 Ambulatory referral to Physical Therapy   2  Pain in both feet M79 671 Ambulatory referral to Physical Therapy    M79 672    3  Radiculopathy of lumbar region M54 16 Ambulatory referral to Physical Therapy   4  Tarsal tunnel syndrome of right side G57 51 Ambulatory referral to Physical Therapy                  Assessment  Impairments: abnormal gait, abnormal muscle firing, abnormal muscle tone, abnormal or restricted ROM, abnormal movement, activity intolerance, impaired physical strength, lacks appropriate home exercise program and pain with function    Assessment details: Pt is a pleasant 37 y o  female who presents to [place] [address] [facility] with R foot pain, signs and sx consistent w/ plantar fascitis  Today, she presents with good ankle ROM, but high self reports of pain through her R arch, decreased B LE and core strength, soft tissue density restrictions through R plantar fascia and calf, and decreased tolerance to functional activity  Functionally, she is limited in her ability to stand and ambulate, especially first thing in the morning, negotiate stairs, and perform full day of work related activities  She is motivated to improve  Pt will benefit from skilled PT to address the aforementioned deficits and limitations in an effort to maximize pain free functional mobility and overall quality of life  Progress as able with these goals in mind  Understanding of Dx/Px/POC: good   Prognosis: good    Goals  Short term goals (3 weeks):  1) Pt will improve B LE and core strength by 1/3 grade MMT  2) Pt will improve self reports of pain at worse to <5/10  3) Pt will minimize soft tissue density restrictions through R distal LE    4) Pt will initiate and progress HEP w/ special emphasis on functional strength and kinesthetic awareness of R foot  Long term goals (6 weeks)  1) Pt will improve FOTO to at least 55   2) Pt will stand and ambulate for durations of at least 45 mins w/o deficit or pain  3) Pt will perform full day of work related activities w/o deficit for foot, and pain <3/10 throughout  4) Pt will be independent and compliant w/ HEP in order to maximize functional benefit of skilled PT following d/c        Plan  Patient would benefit from: skilled PT  Planned modality interventions: cryotherapy and thermotherapy: hydrocollator packs  Planned therapy interventions: abdominal trunk stabilization, activity modification, joint mobilization, manual therapy, massage, motor coordination training, neuromuscular re-education, patient education, postural training, stretching, strengthening, therapeutic activities, therapeutic exercise, therapeutic training, transfer training, home exercise program, gait training, graded activity, functional ROM exercises, graded exercise, flexibility, body mechanics training, balance and balance/weight bearing training  Frequency: 2x week  Duration in visits: 12  Duration in weeks: 6  Treatment plan discussed with: patient  Plan details: POC to include: heat, kinesthetic awareness, soft tissue work, mobs, functional strengthening     HEP to start: towel scrunch, plantar fascia stretch, arch raises          Subjective Evaluation    History of Present Illness  Mechanism of injury: Pt reports having R foot pain for the last 2 years R>L  Notes no BONNIE, first noticed it when stepping out of car  Thought it might have to do with wearing boots, but not sure  Stopped wearing boots for a time but noticed no benefit  Went to Dr Catarino Sanderson after one year of pain, was given cortisone injections on multiple occasions w/ minimal relief  Pt is taking gabapentin for pain, dosage was recently increased  Was given naproxen as well, along w/ pain killer  Notes no significant relief from this  Notes increased pain first thing in the morning, decreases throughout the day in weight bearing  Gets worse if she has to sit for a time and then stand quickly and move  In the last four months, she notes that sitting will cause shooting pain up into lateral ankle  Pt was diagnosed w/ Lupus in May  Thinks this may have an effect on pain  Pt main goal is pain relief  Functional status to follow:   Quality of life: good    Pain  Current pain rating: 3  At best pain rating: 3  At worst pain rating: 10  Location: along R proximal plantar fascia, shoots up into R lateral ankle  Quality: burning, dull ache and sharp  Relieving factors: rest, medications and change in position  Aggravating factors: standing, walking, stair climbing and running  Progression: worsening    Social Support  Steps to enter house: yes  Stairs in house: yes   Lives in: multiple-level home  Lives with: young children (twin 9year olds, 3year olds)    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         Objective     Observations     Additional Observation Details  Min increase in foot flat R compared to L    Palpation     Additional Palpation Details  Mod/max TTP through origin of R plantar fascia, min along longitudinal arch     Mod increase in tissue density through R plantar fascia and min through gastroc     Neurological Testing     Sensation     Ankle/Foot   Left Ankle/Foot   Intact: light touch    Right Ankle/Foot   Intact: light touch     Active Range of Motion   Left Ankle/Foot   Normal active range of motion    Right Ankle/Foot   Normal active range of motion    Passive Range of Motion   Left Ankle/Foot  Normal passive range of motion    Right Ankle/Foot  Normal passive range of motion    Strength/Myotome Testing     Left Ankle/Foot   Dorsiflexion: 4  Plantar flexion: 4  Inversion: 4  Eversion: 4    Right Ankle/Foot   Dorsiflexion: 4-  Plantar flexion: 4-  Inversion: 4-  Eversion: 3+    Additional Strength Details  Hips grossly 4-/5 w/o pain    Core no greater than 2/5    Ambulation     Ambulation: Level Surfaces     Additional Level Surfaces Ambulation Details  initially lands w/ foot flat w/ decreased WB through R LE,, pacing and equality of WB improves w/ continued walking but still antalgic     Functional Assessment     Comments  Sit<>stand: min WB through R LE w/ increased UE support on LE    5 time SL heel raise: not assessed so as not to exacerbate pain             Precautions: standard, recently diagnosed w/ Lupus, hx of back surgery    Specialty Daily Treatment Diary     Manual  10/30 - IE       ktape navicular sling leukotape x5mins       DTM to plantar fascia                                    Exercise Diary  1       Plantar fascia stretch        Ankle 4 way        Arch lifts        Short foot contractions        Heel raises        Ankle abd w/ ev bias        Squats w/ arch lift        Roll outs on ball        SLS on airex                                                                                                     Modalities        Heat pre

## 2018-11-01 ENCOUNTER — OFFICE VISIT (OUTPATIENT)
Dept: PHYSICAL THERAPY | Facility: CLINIC | Age: 43
End: 2018-11-01
Payer: COMMERCIAL

## 2018-11-01 DIAGNOSIS — M72.2 PLANTAR FASCIITIS: Primary | ICD-10-CM

## 2018-11-01 PROCEDURE — 97140 MANUAL THERAPY 1/> REGIONS: CPT

## 2018-11-01 PROCEDURE — 97110 THERAPEUTIC EXERCISES: CPT

## 2018-11-01 NOTE — PROGRESS NOTES
Daily Note     Today's date: 2018  Patient name: Suzanne Dugan  : 1975  MRN: [id number]  Referring provider: Darius Welsh DPM  Dx:   Encounter Diagnosis     ICD-10-CM    1  Plantar fasciitis M72 2        Start Time: 1310  Stop Time: 56  Total time in clinic (min): 55 minutes    Subjective: 4 -5/10 arch of right foot & heel;  Pain is worse  post sit to stand & first thing in the morning; not sure if the tape helped but wants to try it again      Objective: See treatment diary below      Precautions: standard, recently diagnosed w/ Lupus, hx of back surgery    Specialty Daily Treatment Diary     Manual  10/30 - IE 11-1      ktape navicular sling leukotape x5mins Raza tape   Navicular sling       DTM to plantar fascia  X 10 min                                  Exercise Diary  1 2      Plantar fascia stretch  With sheet 3 x 30 sec       Ankle 4 way  RTB x 20 ea       Arch lifts  X 30      Short foot contractions        Heel raises  HR/DF seated x 30 ea       Ankle abd w/ ev bias        Squats w/ arch lift        Roll outs on ball  3 min       SLS on airex           RB seated DF/PF & M/L x 30 ea  Trial stance        Toe curls x 30                                                                                   Modalities        Heat pre        CP   X 10 min                       Assessment: Tolerated treatment well  Patient would benefit from continued PT; pt presents with palpable tenderness arch right foot & along the right  medial heel; DF with RTB challenging; performing toe curls mildly painful        Plan: Continue per plan of care   Encouraged stretching of fascia as issued in home exercise program during her evaluation; also encouraged application of cold pack to plantar surface; assess effect of tape next session

## 2018-11-06 ENCOUNTER — OFFICE VISIT (OUTPATIENT)
Dept: PHYSICAL THERAPY | Facility: CLINIC | Age: 43
End: 2018-11-06
Payer: COMMERCIAL

## 2018-11-06 DIAGNOSIS — M72.2 PLANTAR FASCIITIS: Primary | ICD-10-CM

## 2018-11-06 PROCEDURE — 97110 THERAPEUTIC EXERCISES: CPT

## 2018-11-06 PROCEDURE — 97112 NEUROMUSCULAR REEDUCATION: CPT

## 2018-11-06 PROCEDURE — 97140 MANUAL THERAPY 1/> REGIONS: CPT

## 2018-11-06 NOTE — PROGRESS NOTES
Daily Note     Today's date: 2018  Patient name: Linda Norman  : 1975  MRN: [id number]  Referring provider: Cheikh Ng DPM  Dx:   Encounter Diagnosis     ICD-10-CM    1  Plantar fasciitis M72 2                   Subjective: Pt reports 4/10 pain to start session  Seems like pain is worse in the mornings  Has been keeping up w/ exercises but isn't sure these are helping yet  Objective: Pt requires cueing for proper form w/ arch lifts and short foot contractions but quickly corrects and maintains  Precautions: standard, recently diagnosed w/ Lupus, hx of back surgery    Specialty Daily Treatment Diary     Manual  10/30 - IE 11-     ktape navicular sling leukotape x5mins Raza tape   Navicular sling  no     DTM to plantar fascia  X 10 min x10 min to L plantar fascia                                  Exercise Diary  1 2 3     Plantar fascia stretch  With sheet 3 x 30 sec  Manual 3x20-30 sec holds      Ankle 4 way  RTB x 20 ea  Reviewed      Arch lifts  X 30 x20 seated, x 20 standing      Short foot contractions   On airex 3 sec hold 2x10     Heel raises  HR/DF seated x 30 ea  W/ arch lift 2x10      Ankle abd w/ ev bias   RTB 3-'x2      Squats w/ arch lift        Roll outs on ball  3 min       SLS on airex           RB seated DF/PF & M/L x 30 ea  Trial stance        Toe curls x 30                                                                                   Modalities  2 3     Heat pre   7 min pre      CP   X 10 min  x10 min post                    Assessment: Tolerated treatment well  Patient demonstrated fatigue post treatment and would benefit from continued PT  Notes reduction in sx to 1-2/10 post manual work and exercises, notes that foot is sore after ice  Does well to correct for initial deficits in form w/ foot  training but will benefit from further attention to this matter   Pt encouraged to use frozen water bottle for self massage, as well as to try stretching and gentle exercise prior to getting up in morning  Progress as sx allow        Plan: Continue per plan of care  hip and core stab

## 2018-11-08 ENCOUNTER — APPOINTMENT (OUTPATIENT)
Dept: PHYSICAL THERAPY | Facility: CLINIC | Age: 43
End: 2018-11-08
Payer: COMMERCIAL

## 2018-11-09 DIAGNOSIS — J32.0 LEFT MAXILLARY SINUSITIS: ICD-10-CM

## 2018-11-09 RX ORDER — FLUTICASONE PROPIONATE 50 MCG
SPRAY, SUSPENSION (ML) NASAL
Qty: 16 G | Refills: 0 | Status: SHIPPED | OUTPATIENT
Start: 2018-11-09 | End: 2019-06-24

## 2018-11-13 ENCOUNTER — OFFICE VISIT (OUTPATIENT)
Dept: PHYSICAL THERAPY | Facility: CLINIC | Age: 43
End: 2018-11-13
Payer: COMMERCIAL

## 2018-11-13 DIAGNOSIS — M72.2 PLANTAR FASCIITIS: Primary | ICD-10-CM

## 2018-11-13 PROCEDURE — 97112 NEUROMUSCULAR REEDUCATION: CPT

## 2018-11-13 PROCEDURE — 97140 MANUAL THERAPY 1/> REGIONS: CPT

## 2018-11-13 PROCEDURE — 97110 THERAPEUTIC EXERCISES: CPT

## 2018-11-13 NOTE — PROGRESS NOTES
Daily Note     Today's date: 2018  Patient name: Korin Mejia  : 1975  MRN: [id number]  Referring provider: Dallas Massey DPM  Dx:   Encounter Diagnosis     ICD-10-CM    1  Plantar fasciitis M72 2        Start Time: 1300  Stop Time: 1400  Total time in clinic (min): 60 minutes    Subjective: Pt states she no longer has pain  in the arch but has pain in the right heel & along the lateral border of the right foot       Objective: Pt requires cueing for proper form w/ arch lifts and short foot contractions but quickly corrects and maintains  Precautions: standard, recently diagnosed w/ Lupus, hx of back surgery    Specialty Daily Treatment Diary     Manual  10/30 - IE     ktape navicular sling leukotape x5mins Raza tape   Navicular sling  no No     DTM to plantar fascia  X 10 min x10 min to L plantar fascia  X 10 min to lateral aspect right foot  & heel                                 Exercise Diary  1 2 3 4    Plantar fascia stretch  With sheet 3 x 30 sec  Manual 3x20-30 sec holds  Manual 3 x 30 sec     Ankle 4 way  RTB x 20 ea  Reviewed  RTB x 20  DF, INV/EV only     Arch lifts  X 30 x20 seated, x 20 standing  X 20 seated     Short foot contractions   On airex 3 sec hold 2x10 Not today     Heel raises  HR/DF seated x 30 ea  W/ arch lift 2x10  w arch lift   2 x 10     Ankle abd w/ ev bias   RTB 3-'x2  Not today - NV     Squats w/ arch lift        Roll outs on ball  3 min   3 min ( green ball ) football DEBRA    SLS on airex           RB seated DF/PF & M/L x 30 ea  Trial stance  RB stance   DF/PF x 30       Toe curls x 30   Toe curls x 30         BAPS board seated lvl 2 - df/pf/m/l x 20 ea &  cw/ccw  lvl 3         Trial gastroc/achilles stretch off step                                                                Modalities  2 3 4    Heat pre   7 min pre  X 10 pr     CP   X 10 min  x10 min post  X 10 post                     Assessment: Tolerated treatment well   Patient would benefit from continued PT; point specific pain along the lateral aspect of the right foot & in the right heel during soft tissue mobilization; BAPS board challenging john with M/L & circles on level 2 but may be able to progress to level 3 next session      Plan: Continue per plan of care   Encouraged pt to perform achilles stretch with SOS john  & apply ice

## 2018-11-15 ENCOUNTER — APPOINTMENT (OUTPATIENT)
Dept: PHYSICAL THERAPY | Facility: CLINIC | Age: 43
End: 2018-11-15
Payer: COMMERCIAL

## 2018-11-20 ENCOUNTER — OFFICE VISIT (OUTPATIENT)
Dept: PHYSICAL THERAPY | Facility: CLINIC | Age: 43
End: 2018-11-20
Payer: COMMERCIAL

## 2018-11-20 DIAGNOSIS — M72.2 PLANTAR FASCIITIS: Primary | ICD-10-CM

## 2018-11-20 PROCEDURE — 97112 NEUROMUSCULAR REEDUCATION: CPT

## 2018-11-20 PROCEDURE — 97140 MANUAL THERAPY 1/> REGIONS: CPT

## 2018-11-20 PROCEDURE — 97110 THERAPEUTIC EXERCISES: CPT

## 2018-11-20 NOTE — PROGRESS NOTES
Daily Note     Today's date: 2018  Patient name: Ricardo Carmona  : 1975  MRN: [id number]  Referring provider: Ruben Mary DPM  Dx:   Encounter Diagnosis     ICD-10-CM    1  Plantar fasciitis M72 2                   Subjective: Pt notes 6/10 pain to start session  Notes that pain is localized to plantar and lateral aspect of heel today  Feels that pain is a bit worse  Objective: Tissue adhesions localized to proximal plantar fascia on R side, good reduction w/ manual techniques  Requires cueing for ecc control w/ heel raises but corrects well and maintains         Precautions: standard, recently diagnosed w/ Lupus, hx of back surgery    Specialty Daily Treatment Diary     Manual  10/30 - IE    ktape navicular sling leukotape x5mins Raza tape   Navicular sling  no No  ktape for peroneals, I strip x 3 mins    DTM to plantar fascia  X 10 min x10 min to L plantar fascia  X 10 min to lateral aspect right foot  & heel  x11 min to proximal plantar fascia and insertion of peroneals on R                               Exercise Diary  1 2 3 4 5   Plantar fascia stretch  With sheet 3 x 30 sec  Manual 3x20-30 sec holds  Manual 3 x 30 sec  Self 3x30 sec holds    Ankle 4 way  RTB x 20 ea  Reviewed  RTB x 20  DF, INV/EV only  Seated ankle ev RTB 3x10   Arch lifts  X 30 x20 seated, x 20 standing  X 20 seated  review   Short foot contractions   On airex 3 sec hold 2x10 Not today  On airex in sitting x20 w/ 3 sec hold    Heel raises  HR/DF seated x 30 ea  W/ arch lift 2x10  w arch lift   2 x 10  W/ tennis ball sq between heel 2x15    Ankle abd w/ ev bias   RTB 3-'x2  Not today - NV     Squats w/ arch lift        Roll outs on ball  3 min   3 min ( green ball ) football DEBRA    SLS on airex           RB seated DF/PF & M/L x 30 ea  Trial stance  RB stance   DF/PF x 30       Toe curls x 30   Toe curls x 30         BAPS board seated lvl 2 - df/pf/m/l x 20 ea &  cw/ccw          Soleus stretch over half foam roll 3x30 sec holds                                                                Modalities  2 3 4 5   Heat pre   7 min pre  X 10 pr  10 min pre    CP   X 10 min  x10 min post  X 10 post                 Assessment: Tolerated treatment well  Patient demonstrated fatigue post treatment and would benefit from continued PT  Pt does well w/ today's program, noting fatigue but significant decrease in sx by end  She notes "mild pain" upon leaving  She was educated in [employer] and given RTB for home  Encouraged to obtain golf or lacrosse ball for home massage  Verbalizes understanding  Plan: Continue per plan of care   possible progress note, standing progressions, gait progressions

## 2018-11-27 ENCOUNTER — APPOINTMENT (OUTPATIENT)
Dept: PHYSICAL THERAPY | Facility: CLINIC | Age: 43
End: 2018-11-27
Payer: COMMERCIAL

## 2018-11-29 ENCOUNTER — APPOINTMENT (OUTPATIENT)
Dept: PHYSICAL THERAPY | Facility: CLINIC | Age: 43
End: 2018-11-29
Payer: COMMERCIAL

## 2018-12-03 ENCOUNTER — OFFICE VISIT (OUTPATIENT)
Dept: PHYSICAL THERAPY | Facility: CLINIC | Age: 43
End: 2018-12-03
Payer: COMMERCIAL

## 2018-12-03 ENCOUNTER — APPOINTMENT (OUTPATIENT)
Dept: PHYSICAL THERAPY | Facility: CLINIC | Age: 43
End: 2018-12-03
Payer: COMMERCIAL

## 2018-12-03 DIAGNOSIS — M72.2 PLANTAR FASCIITIS: Primary | ICD-10-CM

## 2018-12-03 PROCEDURE — 97140 MANUAL THERAPY 1/> REGIONS: CPT

## 2018-12-03 PROCEDURE — 97110 THERAPEUTIC EXERCISES: CPT

## 2018-12-03 NOTE — PROGRESS NOTES
Daily Note     Today's date: 12/3/2018  Patient name: Josephine Gordon  : 1975  MRN: [id number]  Referring provider: Shea Alicea DPM  Dx:   Encounter Diagnosis     ICD-10-CM    1  Plantar fasciitis M72 2        Start Time: 5411  Stop Time: 1350  Total time in clinic (min): 45 minutes    Subjective: Pt reports pain level 3-4/10 in right heel only - arch much better; stopped using the orthotics     Objective: Tissue adhesions localized to proximal plantar fascia on R side, good reduction w/ manual techniques  Requires cueing for ecc control w/ heel raises but corrects well and maintains         Precautions: standard, recently diagnosed w/ Lupus, hx of back surgery    Specialty Daily Treatment Diary     Manual  12-3   11/6 11-   ktape navicular sling leukotape x5mins - not today  Raza tape   Navicular sling  no No  ktape for peroneals, I strip x 3 mins    DTM to plantar fascia X 10 min to right heel only  X 10 min x10 min to L plantar fascia  X 10 min to lateral aspect right foot  & heel  x11 min to proximal plantar fascia and insertion of peroneals on R                               Exercise Diary  6  3 4 5   Plantar fascia stretch Achilles stretch w SOS 3 x 30 sec  With sheet 3 x 30 sec  Manual 3x20-30 sec holds  Manual 3 x 30 sec  Self 3x30 sec holds    Ankle 4 way Seated ankle ev RTB 3 x 10  RTB x 20 ea  Reviewed  RTB x 20  DF, INV/EV only  Seated ankle ev RTB 3x10   Arch lifts  X 30 x20 seated, x 20 standing  X 20 seated  review   Short foot contractions   On airex 3 sec hold 2x10 Not today  On airex in sitting x20 w/ 3 sec hold    Heel raises  HR/DF seated x 30 ea  W/ arch lift 2x10  w arch lift   2 x 10  W/ tennis ball sq between heel 2x15    Ankle abd w/ ev bias   RTB 3-'x2  Not today - NV     Squats w/ arch lift        Roll outs on ball  3 min   3 min ( green ball ) football DEBRA    SLS on airex           RB seated DF/PF & M/L x 30 ea  Trial stance  RB stance   DF/PF x 30       Toe curls x 30 Toe curls x 30         BAPS board seated lvl 2 - df/pf/m/l x 20 ea &  cw/ccw      Soleus stretch over half foam roll  3 x 30 sec     Soleus stretch over half foam roll 3x30 sec holds             Runners stretch 3 x 30 sec         prostretch 3 x 30 sec         Stretch off step 3 x 30 sec                                    Modalities 6  3 4 5   Heat pre   7 min pre  X 10 pr  10 min pre    CP  X 10 min  - heel  X 10 min  x10 min post  X 10 post                     Assessment: Tolerated treatment well  Patient would benefit from continued PT; focused on soft tissue mobilization & stretching right heel       Plan: Continue per plan of care   Pt to add achilles stretches introduced today to her home program - assess any increased relief next visit

## 2018-12-05 ENCOUNTER — OFFICE VISIT (OUTPATIENT)
Dept: PHYSICAL THERAPY | Facility: CLINIC | Age: 43
End: 2018-12-05
Payer: COMMERCIAL

## 2018-12-05 DIAGNOSIS — M72.2 PLANTAR FASCIITIS: Primary | ICD-10-CM

## 2018-12-05 PROCEDURE — G8979 MOBILITY GOAL STATUS: HCPCS

## 2018-12-05 PROCEDURE — 97140 MANUAL THERAPY 1/> REGIONS: CPT

## 2018-12-05 PROCEDURE — 97110 THERAPEUTIC EXERCISES: CPT

## 2018-12-05 PROCEDURE — G8978 MOBILITY CURRENT STATUS: HCPCS

## 2018-12-05 NOTE — PROGRESS NOTES
Progress Note:    Goals  Short term goals (3 weeks):  1) Pt will improve B LE and core strength by 1/3 grade MMT  - achieved   2) Pt will improve self reports of pain at worse to <5/10  - progressed   3) Pt will minimize soft tissue density restrictions through R distal LE  - progressed  4) Pt will initiate and progress HEP w/ special emphasis on functional strength and kinesthetic awareness of R foot  - achieved     Long term goals (6 weeks)  1) Pt will improve FOTO to at least 55  - no progress   2) Pt will stand and ambulate for durations of at least 45 mins w/o deficit or pain  - progressed   3) Pt will perform full day of work related activities w/o deficit for foot, and pain <3/10 throughout  - progressed    4) Pt will be independent and compliant w/ HEP in order to maximize functional benefit of skilled PT following d/c  - progressed    New STG 2018:  1) Pt will further improve B LE and core strength by 1/3 grade MMT  2) Pt will report pain at worst <4/10  3) Pt will minimize soft tissue density restrictions through distal R LE      ** LTG extended 2 weeks  Pain  Current pain ratin-6  At best pain ratin-3  At worst pain ratin  Location: along R proximal plantar fascia, shoots up into R lateral ankle  Quality: burning, dull ache and sharp  Relieving factors: rest, medications and change in position  Aggravating factors: standing, walking, stair climbing and running  Progression: improved for most part (worse today)    Social Support  Steps to enter house: yes  Stairs in house: yes   Lives in: multiple-level home  Lives with: young children (twin 9year olds, 3year olds)    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         Objective     Observations     Additional Observation Details  Min increase in foot flat R compared to L    Palpation Additional Palpation Details  Mod/max TTP through origin of R plantar fascia, min along longitudinal arch     Mod increase in tissue density through R plantar fascia and min through gastroc     Neurological Testing     Sensation     Ankle/Foot   Left Ankle/Foot   Intact: light touch    Right Ankle/Foot   Intact: light touch     Active Range of Motion   Left Ankle/Foot   Normal active range of motion    Right Ankle/Foot   Normal active range of motion    Passive Range of Motion   Left Ankle/Foot  Normal passive range of motion    Right Ankle/Foot  Normal passive range of motion    Strength/Myotome Testing     Left Ankle/Foot   Dorsiflexion: 4  Plantar flexion: 4  Inversion: 4  Eversion: 4    Right Ankle/Foot   Dorsiflexion: 4  Plantar flexion: 4  Inversion: 4  Eversion: 4-    Additional Strength Details  Hips grossly 4/5 w/o pain    Core no greater than 2/5    Ambulation     Ambulation: Level Surfaces     Additional Level Surfaces Ambulation Details  Antalgic today but has been improved in previous sessions, improved heel strike at IC w/ better weight acceptance during stance phase, has not been antalgic at previous visits  Functional Assessment     Comments  Sit<>stand: min decreased WB through R LE, improved since IE   Min UE support     5 time SL heel raise: not assessed today s/t pain exacerbation     _____________________________  SEE PTA NOTE FOR TODAY'S TX  _____________________________      Pain  Current pain rating: 3  At best pain rating: 3  At worst pain rating: 10  Location: along R proximal plantar fascia, shoots up into R lateral ankle  Quality: burning, dull ache and sharp  Relieving factors: rest, medications and change in position  Aggravating factors: standing, walking, stair climbing and running  Progression: worsening    Social Support  Steps to enter house: yes  Stairs in house: yes   Lives in: multiple-level home  Lives with: young children (twin 9year olds, 3year olds)    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 Objective     Observations     Additional Observation Details  Min increase in foot flat R compared to L    Palpation     Additional Palpation Details  Mod/max TTP through origin of R plantar fascia, min along longitudinal arch     Mod increase in tissue density through R plantar fascia and min through gastroc     Neurological Testing     Sensation     Ankle/Foot   Left Ankle/Foot   Intact: light touch    Right Ankle/Foot   Intact: light touch     Active Range of Motion   Left Ankle/Foot   Normal active range of motion    Right Ankle/Foot   Normal active range of motion    Passive Range of Motion   Left Ankle/Foot  Normal passive range of motion    Right Ankle/Foot  Normal passive range of motion    Strength/Myotome Testing     Left Ankle/Foot   Dorsiflexion: 4  Plantar flexion: 4  Inversion: 4  Eversion: 4    Right Ankle/Foot   Dorsiflexion: 4-  Plantar flexion: 4-  Inversion: 4-  Eversion: 3+    Additional Strength Details  Hips grossly 4-/5 w/o pain    Core no greater than 2/5    Ambulation     Ambulation: Level Surfaces     Additional Level Surfaces Ambulation Details  initially lands w/ foot flat w/ decreased WB through R LE,, pacing and equality of WB improves w/ continued walking but still antalgic     Functional Assessment     Comments  Sit<>stand: min WB through R LE w/ increased UE support on LE    5 time SL heel raise: not assessed so as not to exacerbate pain  Assessment:   Pt has made good progress to date despite c/o increased pain today  Pain increases could be in relation to heavy workload during workday yesterday  She was educated in home icing and stretching to help combat this w/ good success  Pain today is elevated but overall complaints of pain at worse have decreased   She has made good progress towards all goals (save for FOTO goal) and will benefit from continued services w/ heavy emphasis on pain free functional mobility  Ktape to peroneals added today to help offload work rate through lateral ankle stabilizers

## 2018-12-05 NOTE — PROGRESS NOTES
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       Objective: See treatment diary below      Precautions: standard, recently diagnosed w/ Lupus, hx of back surgery    Specialty Daily Treatment Diary     Manual  12-3  12-5  11-13    ktape navicular sling leukotape x5mins - not today  Raza tape   Navicular sling - not today   Trial KT to right lateral malleolus  no No  ktape for peroneals, I strip x 3 mins    DTM to plantar fascia X 10 min to right heel only  X 10 min to right lat malleolus & right lat foot/heel x10 min to L plantar fascia  X 10 min to lateral aspect right foot  & heel  x11 min to proximal plantar fascia and insertion of peroneals on R                               Exercise Diary  6 7   4 5   Plantar fascia stretch Achilles stretch w SOS 3 x 30 sec  With sheet 3 x 30 sec  Manual 3x20-30 sec holds  Manual 3 x 30 sec  Self 3x30 sec holds    Ankle 4 way Seated ankle ev RTB 3 x 10  Seated ankle ev RTB 2x 10  Reviewed  RTB x 20  DF, INV/EV only  Seated ankle ev RTB 3x10   Arch lifts  X 30 x20 seated, x 20 standing  X 20 seated  review   Short foot contractions   On airex 3 sec hold 2x10 Not today  On airex in sitting x20 w/ 3 sec hold    Heel raises  HR/DF seated x 30 ea  W/ arch lift 2x10  w arch lift   2 x 10  W/ tennis ball sq between heel 2x15    Ankle abd w/ ev bias   RTB 3-'x2  Not today - NV     Squats w/ arch lift        Roll outs on ball  3 min   3 min ( green ball ) football DEBRA    SLS on airex           RB seated DF/PF & M/L x 30 ea - not today  Trial stance  RB stance   DF/PF x 30       Toe curls x 30   Toe curls x 30       BAPS board lvl 3 DF/PF/M/L x 20 & CW/CCW x 10 ea   BAPS board seated lvl 2 - df/pf/m/l x 20 ea &  cw/ccw      Soleus stretch over half foam roll  3 x 30 sec  No    Soleus stretch over half foam roll 3x30 sec holds             Runners stretch 3 x 30 sec  no       prostretch 3 x 30 sec  No        Stretch off step 3 x 30 sec  No                                   Modalities 6 7  4 5   Heat pre   7 min pre  X 10 pr  10 min pre    CP  X 10 min  - heel  X 10 min  x10 min post  X 10 post                       Assessment: Tolerated treatment well  Patient would benefit from continued PT; FOTO score decreased slightly today; pt had been reporting decreasing pain but sudden return today  of burning sensation lateral right heel, lateral  foot & to inferior/ posterior lateral malleolus - assess next session as 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

## 2018-12-11 ENCOUNTER — OFFICE VISIT (OUTPATIENT)
Dept: PHYSICAL THERAPY | Facility: CLINIC | Age: 43
End: 2018-12-11
Payer: COMMERCIAL

## 2018-12-11 DIAGNOSIS — M72.2 PLANTAR FASCIITIS: Primary | ICD-10-CM

## 2018-12-11 PROCEDURE — 97140 MANUAL THERAPY 1/> REGIONS: CPT

## 2018-12-11 PROCEDURE — 97110 THERAPEUTIC EXERCISES: CPT

## 2018-12-11 NOTE — PROGRESS NOTES
Daily Note     Today's date: 2018  Patient name: Nathan Robles  : 1975  MRN: [id number]  Referring provider: Darrin Esparza DPM  Dx:   Encounter Diagnosis     ICD-10-CM    1  Plantar fasciitis M72 2        Start Time:   Stop Time: 1055  Total time in clinic (min): 40 minutes    Subjective: 5/10 pain right   heel only - arch is feeling much better - no longer has burning along  the side of the foot       Objective: See treatment diary below      Precautions: standard, recently diagnosed w/ Lupus, hx of back surgery    Specialty Daily Treatment Diary     Manual  12-3  12-5    ktape navicular sling leukotape x5mins - not today  Raza tape   Navicular sling - not today   Trial KT to right lateral malleolus  None    No  ktape for peroneals, I strip x 3 mins    DTM to plantar fascia X 10 min to right heel only  X 10 min to right lat malleolus & right lat foot/heel x10 min to L heel only  X 10 min to lateral aspect right foot  & heel  x11 min to proximal plantar fascia and insertion of peroneals on R                               Exercise Diary  6 7  8  5   Plantar fascia stretch Achilles stretch w SOS 3 x 30 sec  With sheet 3 x 30 sec  Manual 3x30 sec  holds  Manual 3 x 30 sec  Self 3x30 sec holds    Ankle 4 way Seated ankle ev RTB 3 x 10  Seated ankle ev RTB 2x 10  Ankle eversion RTB 2 x 10   RTB x 20  DF, INV/EV only  Seated ankle ev RTB 3x10   Arch lifts  X 30 x20 seated  X 20 seated  review   Short foot contractions   On airex 3 sec hold 2x10 - no  Not today  On airex in sitting x20 w/ 3 sec hold    Heel raises  HR/DF seated x 30 ea  Seated DF x 20  w arch lift   2 x 10  W/ tennis ball sq between heel 2x15    Ankle abd w/ ev bias    Not today - NV     Squats w/ arch lift        Roll outs on ball  3 min  3 min  3 min ( green ball ) football DEBRA    SLS on airex           RB seated DF/PF & M/L x 30 ea - not today  Not today  RB stance   DF/PF x 30       Toe curls x 30   Toe curls x 30 BAPS board lvl 3 DF/PF/M/L x 20 & CW/CCW x 10 ea  No  BAPS board seated lvl 2 - df/pf/m/l x 20 ea &  cw/ccw      Soleus stretch over half foam roll  3 x 30 sec  No    Soleus stretch over half foam roll 3x30 sec holds             Runners stretch 3 x 30 sec  no 3 x 30 sec       prostretch 3 x 30 sec  No  3 x 30sec       Stretch off step 3 x 30 sec  No  3 x 30 sec - unilateral       Achilles stretch in long sit SOS   3 x 30 sec                          Modalities 6 7 8  5   Heat pre   No  X 10 pr  10 min pre    CP  X 10 min  - heel  X 10 min  No  X 10 post                           Assessment: Tolerated treatment well  Patient would benefit from continued PT; palpable tenderness right heel; tightness in arch & " burning" sensation resolving right foot       Plan: Continue per plan of care   Encouraged continued achilles stretching

## 2018-12-12 ENCOUNTER — APPOINTMENT (OUTPATIENT)
Dept: PHYSICAL THERAPY | Facility: CLINIC | Age: 43
End: 2018-12-12
Payer: COMMERCIAL

## 2018-12-14 ENCOUNTER — OFFICE VISIT (OUTPATIENT)
Dept: PODIATRY | Facility: CLINIC | Age: 43
End: 2018-12-14
Payer: COMMERCIAL

## 2018-12-14 VITALS
WEIGHT: 188 LBS | DIASTOLIC BLOOD PRESSURE: 71 MMHG | SYSTOLIC BLOOD PRESSURE: 106 MMHG | BODY MASS INDEX: 31.32 KG/M2 | HEIGHT: 65 IN

## 2018-12-14 DIAGNOSIS — M79.671 RIGHT FOOT PAIN: ICD-10-CM

## 2018-12-14 DIAGNOSIS — R26.2 DIFFICULTY WALKING: ICD-10-CM

## 2018-12-14 DIAGNOSIS — M72.2 PLANTAR FASCIITIS: Primary | ICD-10-CM

## 2018-12-14 DIAGNOSIS — M54.16 RADICULOPATHY OF LUMBAR REGION: ICD-10-CM

## 2018-12-14 DIAGNOSIS — G57.51 TARSAL TUNNEL SYNDROME OF RIGHT SIDE: ICD-10-CM

## 2018-12-14 PROCEDURE — 99213 OFFICE O/P EST LOW 20 MIN: CPT | Performed by: PODIATRIST

## 2018-12-14 RX ORDER — GABAPENTIN 600 MG/1
600 TABLET ORAL 2 TIMES DAILY
Qty: 60 TABLET | Refills: 0 | Status: SHIPPED | OUTPATIENT
Start: 2018-12-14 | End: 2019-01-11 | Stop reason: SDUPTHER

## 2018-12-14 RX ORDER — METHYLPREDNISOLONE 4 MG/1
TABLET ORAL
Qty: 21 TABLET | Refills: 0 | Status: SHIPPED | OUTPATIENT
Start: 2018-12-14 | End: 2019-01-21

## 2018-12-14 RX ORDER — NAPROXEN 500 MG/1
500 TABLET ORAL 2 TIMES DAILY WITH MEALS
Qty: 60 TABLET | Refills: 0 | Status: SHIPPED | OUTPATIENT
Start: 2018-12-14 | End: 2019-01-22 | Stop reason: SDUPTHER

## 2018-12-14 NOTE — PROGRESS NOTES
Procedures   Foot Exam       Assessment/Plan:   Metatarsalgia  Plantar fasciitis  Resolving  Rule out radiculopathy  Rule out tarsal tunnel syndrome, resolving     Plan  Will increase gabapentin dosing  Physical therapy has been ordered  patient declines injection therapy  We will add Medrol Dosepak  Patient remain on Naprosyn  Diagnoses and all orders for this visit:     Plantar fasciitis, resolving     Pain in both feet  -     gabapentin (NEURONTIN) 600 MG tablet; Take 1 tablet (600 mg total) by mouth 2 (two) times a day for 30 days     Radiculopathy of lumbar region  -     gabapentin (NEURONTIN) 600 MG tablet; Take 1 tablet (600 mg total) by mouth 2 (two) times a day for 30 days     Tarsal tunnel syndrome of right side, resolving            Subjective:  Patient is continuing and ongoing pain of the foot  She has pain in the right foot  She has low back pain  Patient did not get MRI  she declines injection therapy   Patient ID: Darius Cao is a 37 y o  female           Past Medical History:   Diagnosis Date    Acquired ankle/foot deformity       last assessed: 8/29/2017    Anxiety      Chronic pain disorder       right foot plantar    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    Dysfunctional uterine bleeding       Last assessed: 1/9/2018    Headache      Lupus      Mass of knee       last assessed: 5/7/2014    Mild acid reflux      Paronychia of finger       last assessed: 11/20/2013    Paronychia of toe       last asssessed: 9/28/2016   right foot    Patellofemoral dysfunction       last assessed: 3/21/2014    PCOS (polycystic ovarian syndrome) 12/15/2007    Pes planus, congenital       last assessed: 8/29/2017    Plantar fasciitis of right foot      Plantar fibromatosis       last assessed: 9/28/2017    PONV (postoperative nausea and vomiting)      Trichomoniasis       last assessed: 1/16/2015    Vaginal candidiasis       last assessed: 11/30/2016    Varicose veins of lower extremity       last assessed: 3/5/2014    Vertigo       last assessed: 3/5/2014    Viral gastroenteritis       last assessed: 2015               Past Surgical History:   Procedure Laterality Date    BACK SURGERY         discectomy lumbar     SECTION         x2 bikini    PILONIDAL CYST EXCISION        VT HYSTEROSCOPY,W/ENDO BX N/A 10/18/2017     Procedure: HYSTEROSCOPY AND FRACTIONAL DILATATION AND CURRETTAGE;  Surgeon: Arlys Cogan, MD;  Location: [facility] MAIN OR;  Service: Gynecology    TUBAL LIGATION   2016    WISDOM TOOTH EXTRACTION             No Known Allergies        Current Outpatient Prescriptions:     albuterol (PROAIR HFA) 90 mcg/act inhaler, Inhale 2 puffs every 4 (four) hours as needed for wheezing (Patient not taking: Reported on 10/9/2018 ), Disp: 8 5 g, Rfl: 0    benzonatate (TESSALON) 200 MG capsule, Take 1 capsule (200 mg total) by mouth 3 (three) times a day as needed for cough, Disp: 30 capsule, Rfl: 3    cholecalciferol (VITAMIN D3) 1,000 units tablet, Take 1,000 Units by mouth daily, Disp: , Rfl:     fluticasone (FLOVENT HFA) 110 MCG/ACT inhaler, Inhale 1 puff 2 (two) times a day Rinse mouth after use , Disp: 1 Inhaler, Rfl: 0    gabapentin (NEURONTIN) 300 mg capsule, Take 1 capsule (300 mg total) by mouth 2 (two) times a day for 30 days, Disp: 60 capsule, Rfl: 0    gabapentin (NEURONTIN) 600 MG tablet, Take 1 tablet (600 mg total) by mouth 2 (two) times a day for 30 days, Disp: 60 tablet, Rfl: 0    hydroxychloroquine (PLAQUENIL) 200 mg tablet, Take 200 mg by mouth 2 (two) times a day, Disp: , Rfl: 0    naproxen (NAPROSYN) 500 mg tablet, Take 1 tablet (500 mg total) by mouth 2 (two) times a day with meals for 60 days, Disp: 60 tablet, Rfl: 1    omeprazole (PriLOSEC) 20 mg delayed release capsule, Take 20 mg by mouth as needed, Disp: , Rfl:     promethazine-codeine (PHENERGAN WITH CODEINE) 6 25-10 mg/5 mL syrup, Take 5 mL by mouth daily at bedtime as needed for cough, Disp: 120 mL, Rfl: 0    SUMAtriptan (IMITREX) 100 mg tablet, Take 1 tablet (100 mg total) by mouth once as needed for migraine for up to 1 dose Within 30 min onset of headache, Disp: 9 tablet, Rfl: 1         Patient Active Problem List   Diagnosis    Plantar fasciitis    Pain in both feet    Congenital pes planus of right foot    Congenital pes planus of left foot    Radiculopathy of lumbar region    Difficulty walking    Tarsal tunnel syndrome of right side    Closed nondisplaced fracture of body of right calcaneus    Other forms of systemic lupus erythematosus ([address] )    Right foot pain    Common migraine without aura    Generalized anxiety disorder         HPI     The following portions of the patient's history were reviewed and updated as appropriate: allergies, current medications, past family history, past medical history, past social history, past surgical history and problem list      Review of Systems       Historical Information   Past Medical History:   Diagnosis Date    Acquired ankle/foot deformity       last assessed: 8/29/2017    Anxiety      Chronic pain disorder       right foot plantar    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    Dysfunctional uterine bleeding       Last assessed: 1/9/2018    Headache      Lupus      Mass of knee       last assessed: 5/7/2014    Mild acid reflux      Paronychia of finger       last assessed: 11/20/2013    Paronychia of toe       last asssessed: 9/28/2016   right foot    Patellofemoral dysfunction       last assessed: 3/21/2014    PCOS (polycystic ovarian syndrome) 12/15/2007    Pes planus, congenital       last assessed: 8/29/2017    Plantar fasciitis of right foot      Plantar fibromatosis       last assessed: 9/28/2017    PONV (postoperative nausea and vomiting)      Trichomoniasis       last assessed: 1/16/2015    Vaginal candidiasis       last assessed: 11/30/2016    Varicose veins of lower extremity       last assessed: 3/5/2014    Vertigo       last assessed: 3/5/2014    Viral gastroenteritis       last assessed: 2015            Past Surgical History:   Procedure Laterality Date    BACK SURGERY         discectomy lumbar     SECTION         x2 bikini    PILONIDAL CYST EXCISION        SC HYSTEROSCOPY,W/ENDO BX N/A 10/18/2017     Procedure: HYSTEROSCOPY AND FRACTIONAL DILATATION AND CURRETTAGE;  Surgeon: Merary Boston MD;  Location[facility] MAIN OR;  Service: Gynecology    TUBAL LIGATION   2016    WISDOM TOOTH EXTRACTION          Social History        History   Alcohol Use    Yes       Comment: occ          History   Drug Use No      Family History:         Family History   Problem Relation Age of Onset    Cancer Mother           skin , gallbladder    Liver disease Mother      Hypertension Father      Heart disease Sister      Cancer Brother           skin    No Known Problems Daughter      No Known Problems Son      No Known Problems Son      Colon cancer Maternal Uncle      Arthritis Family      Lung cancer Family      Uterine cancer Family           Meds/Allergies   No Known Allergies            Current Outpatient Prescriptions on File Prior to Visit   Medication Sig Dispense Refill    albuterol (PROAIR HFA) 90 mcg/act inhaler Inhale 2 puffs every 4 (four) hours as needed for wheezing (Patient not taking: Reported on 10/9/2018 ) 8 5 g 0    benzonatate (TESSALON) 200 MG capsule Take 1 capsule (200 mg total) by mouth 3 (three) times a day as needed for cough 30 capsule 3    cholecalciferol (VITAMIN D3) 1,000 units tablet Take 1,000 Units by mouth daily        fluticasone (FLOVENT HFA) 110 MCG/ACT inhaler Inhale 1 puff 2 (two) times a day Rinse mouth after use   1 Inhaler 0    gabapentin (NEURONTIN) 300 mg capsule Take 1 capsule (300 mg total) by mouth 2 (two) times a day for 30 days 60 capsule 0    hydroxychloroquine (PLAQUENIL) 200 mg tablet Take 200 mg by mouth 2 (two) times a day   0    naproxen (NAPROSYN) 500 mg tablet Take 1 tablet (500 mg total) by mouth 2 (two) times a day with meals for 60 days 60 tablet 1    omeprazole (PriLOSEC) 20 mg delayed release capsule Take 20 mg by mouth as needed        promethazine-codeine (PHENERGAN WITH CODEINE) 6 25-10 mg/5 mL syrup Take 5 mL by mouth daily at bedtime as needed for cough 120 mL 0    SUMAtriptan (IMITREX) 100 mg tablet Take 1 tablet (100 mg total) by mouth once as needed for migraine for up to 1 dose Within 30 min onset of headache 9 tablet 1      No current facility-administered medications on file prior to visit              Objective:  Patient's shoes and socks removed     Foot ExamPhysical Exam              Foot Exam     General  General Appearance: appears stated age and healthy   Orientation: alert and oriented to person, place, and time   Affect: appropriate   Gait: antalgic         Right Foot/Ankle      Inspection and Palpation  Ecchymosis: none  Tenderness: plantar fascia   Swelling: plantar fascia   Arch: pes planus  Hammertoes: fifth toe  Hallux valgus: no  Hallux limitus: yes  Skin Exam: skin intact;      Neurovascular  Dorsalis pedis: 3+  Posterior tibial: 3+  Superficial peroneal nerve sensation: diminished  Deep peroneal nerve sensation: diminished  Achilles reflex: 1+     Muscle Strength  Ankle dorsiflexion: 4+     Tests  PT Tinel's sign: negative    Too many toes: positive      Comments  Pain with palpation heel   4/5 L5 testing bilateral      Left Foot/Ankle       Inspection and Palpation  Ecchymosis: none  Tenderness: plantar fascia   Swelling: none   Arch: pes planus  Hammertoes: fifth toe  Hallux valgus: no  Hallux limitus: yes  Skin Exam: skin intact;      Neurovascular  Dorsalis pedis: 3+  Posterior tibial: 3+  Superficial peroneal nerve sensation: diminished  Deep peroneal nerve sensation: diminished  Achilles reflex: 1+     Muscle Strength  Ankle dorsiflexion: 4+     Tests  Too many toes: positive         Physical Exam   Cardiovascular: Pulses:       Dorsalis pedis pulses are 3+ on the right side, and 3+ on the left side         Posterior tibial pulses are 3+ on the right side, and 3+ on the left side     Neurological:   Reflex Scores:       Achilles reflexes are 1+ on the right side and 1+ on the left side

## 2018-12-17 ENCOUNTER — APPOINTMENT (OUTPATIENT)
Dept: PHYSICAL THERAPY | Facility: CLINIC | Age: 43
End: 2018-12-17
Payer: COMMERCIAL

## 2018-12-19 ENCOUNTER — APPOINTMENT (OUTPATIENT)
Dept: PHYSICAL THERAPY | Facility: CLINIC | Age: 43
End: 2018-12-19
Payer: COMMERCIAL

## 2018-12-20 ENCOUNTER — APPOINTMENT (OUTPATIENT)
Dept: PHYSICAL THERAPY | Facility: CLINIC | Age: 43
End: 2018-12-20
Payer: COMMERCIAL

## 2018-12-26 ENCOUNTER — APPOINTMENT (OUTPATIENT)
Dept: PHYSICAL THERAPY | Facility: CLINIC | Age: 43
End: 2018-12-26
Payer: COMMERCIAL

## 2018-12-27 ENCOUNTER — APPOINTMENT (OUTPATIENT)
Dept: PHYSICAL THERAPY | Facility: CLINIC | Age: 43
End: 2018-12-27
Payer: COMMERCIAL

## 2019-01-02 DIAGNOSIS — K21.9 GERD WITHOUT ESOPHAGITIS: Primary | ICD-10-CM

## 2019-01-02 DIAGNOSIS — G43.009 MIGRAINE WITHOUT AURA AND WITHOUT STATUS MIGRAINOSUS, NOT INTRACTABLE: ICD-10-CM

## 2019-01-02 RX ORDER — OMEPRAZOLE 20 MG/1
20 CAPSULE, DELAYED RELEASE ORAL DAILY
Qty: 30 CAPSULE | Refills: 0 | Status: SHIPPED | OUTPATIENT
Start: 2019-01-02 | End: 2019-09-09 | Stop reason: SDUPTHER

## 2019-01-02 RX ORDER — SUMATRIPTAN 100 MG/1
100 TABLET, FILM COATED ORAL ONCE AS NEEDED
Qty: 9 TABLET | Refills: 0 | Status: SHIPPED | OUTPATIENT
Start: 2019-01-02 | End: 2019-02-26 | Stop reason: SDUPTHER

## 2019-01-03 ENCOUNTER — OFFICE VISIT (OUTPATIENT)
Dept: PHYSICAL THERAPY | Facility: CLINIC | Age: 44
End: 2019-01-03
Payer: COMMERCIAL

## 2019-01-03 DIAGNOSIS — M72.2 PLANTAR FASCIITIS: Primary | ICD-10-CM

## 2019-01-03 PROCEDURE — G8979 MOBILITY GOAL STATUS: HCPCS

## 2019-01-03 PROCEDURE — 97110 THERAPEUTIC EXERCISES: CPT

## 2019-01-03 PROCEDURE — G8980 MOBILITY D/C STATUS: HCPCS

## 2019-01-03 PROCEDURE — 97140 MANUAL THERAPY 1/> REGIONS: CPT

## 2019-01-03 NOTE — PROGRESS NOTES
Daily Note     Today's date: 1/3/2019  Patient name: Melinda Chaudhari  : 1975  MRN: [id number]  Referring provider: Brain Gagnon DPM  Dx:   Encounter Diagnosis     ICD-10-CM    1  Plantar fasciitis M72 2                   Subjective: Morgan Strauss reports that her doctor offered an injection, but patient deferred due to lack of improvement  States pain is on lateral aspect of foot in area of 5th met  States soreness has been for for past month  Pain level is 6/10 entering today  States she uses her orthotics consistently        Objective: See treatment diary below  Precautions: standard, recently diagnosed w/ Lupus, hx of back surgery    Specialty Daily Treatment Diary     Manual  12-3  12-5 12-11 1/3/18 11/20   ktape navicular sling leukotape x5mins - not today  Raza tape   Navicular sling - not today   Trial KT to right lateral malleolus  None    Not performed  ktape for peroneals, I strip x 3 mins    DTM to plantar fascia X 10 min to right heel only  X 10 min to right lat malleolus & right lat foot/heel x10 min to L heel only  10 min  x11 min to proximal plantar fascia and insertion of peroneals on R                               Exercise Diary  6 7  8 9 5   Plantar fascia stretch Achilles stretch w SOS 3 x 30 sec  With sheet 3 x 30 sec  Manual 3x30 sec  holds  30 sec x 3 Self 3x30 sec holds    Ankle 4 way Seated ankle ev RTB 3 x 10  Seated ankle ev RTB 2x 10  Ankle eversion RTB 2 x 10   Red 2x10  Seated ankle ev RTB 3x10   Arch lifts  X 30 x20 seated  20  X  review   Short foot contractions   On airex 3 sec hold 2x10 - no  3 sec x 20 On airex in sitting x20 w/ 3 sec hold    Heel raises  HR/DF seated x 30 ea  Seated DF x 20  20x  W/ tennis ball sq between heel 2x15    Ankle abd w/ ev bias        Squats w/ arch lift        Roll outs on ball  3 min  3 min  3 min     SLS on airex           RB seated DF/PF & M/L x 30 ea - not today  Not today        Toe curls x 30         BAPS board lvl 3 DF/PF/M/L x 20 & CW/CCW x 10 ea  No  lv 3 15 x each      Soleus stretch over half foam roll  3 x 30 sec  No   30 sec x 3 on half roll  Soleus stretch over half foam roll 3x30 sec holds             Runners stretch 3 x 30 sec  no 3 x 30 sec  Not performed       prostretch 3 x 30 sec  No  3 x 30sec  30 sec x 3      Stretch off step 3 x 30 sec  No  3 x 30 sec - unilateral  30 sec x 3      Achilles stretch in long sit SOS   3 x 30 sec  Not performed                         Modalities 6 7 8 9  5   Heat pre   No  10 min  10 min pre    CP  X 10 min  - heel  X 10 min  No                  Assessment:   Patient with  main complaint in area of 5th metatarsal, no significant tightness observed in gastroc complex or soleus with self stretches and manual stretching  No significant change in symptoms after session today  Discussed with primary PT  Plan: per bijal PT who spoke with patient during session   patient will follow up with primary after her

## 2019-01-11 ENCOUNTER — OFFICE VISIT (OUTPATIENT)
Dept: PODIATRY | Facility: CLINIC | Age: 44
End: 2019-01-11
Payer: COMMERCIAL

## 2019-01-11 VITALS — WEIGHT: 188 LBS | BODY MASS INDEX: 31.32 KG/M2 | HEIGHT: 65 IN

## 2019-01-11 DIAGNOSIS — Q66.51 CONGENITAL PES PLANUS OF RIGHT FOOT: ICD-10-CM

## 2019-01-11 DIAGNOSIS — M72.2 PLANTAR FASCIITIS: Primary | ICD-10-CM

## 2019-01-11 DIAGNOSIS — M79.671 RIGHT FOOT PAIN: ICD-10-CM

## 2019-01-11 DIAGNOSIS — R26.2 DIFFICULTY WALKING: ICD-10-CM

## 2019-01-11 DIAGNOSIS — M54.16 RADICULOPATHY OF LUMBAR REGION: ICD-10-CM

## 2019-01-11 DIAGNOSIS — G57.51 TARSAL TUNNEL SYNDROME OF RIGHT SIDE: ICD-10-CM

## 2019-01-11 PROCEDURE — 99213 OFFICE O/P EST LOW 20 MIN: CPT | Performed by: PODIATRIST

## 2019-01-11 RX ORDER — GABAPENTIN 600 MG/1
600 TABLET ORAL 2 TIMES DAILY
Qty: 60 TABLET | Refills: 0 | Status: SHIPPED | OUTPATIENT
Start: 2019-01-11 | End: 2022-04-28

## 2019-01-11 NOTE — PROGRESS NOTES
Procedures   Foot Exam       Assessment/Plan:   Metatarsalgia   Plantar fasciitis  Chronic right foot pain   Resolving  Rule out radiculopathy   Rule out tarsal tunnel syndrome, resolving     Plan   We will maintain gabapentin dosing   Physical therapy has been ordered    patient declines injection therapy  Patient remain on Naprosyn  Patient has been referred Orthopedics for surgical intervention   Diagnoses and all orders for this visit:     Plantar fasciitis, resolving     Pain in both feet  -     gabapentin (NEURONTIN) 600 MG tablet; Take 1 tablet (600 mg total) by mouth 2 (two) times a day for 30 days     Radiculopathy of lumbar region  -     gabapentin (NEURONTIN) 600 MG tablet; Take 1 tablet (600 mg total) by mouth 2 (two) times a day for 30 days     Tarsal tunnel syndrome of right side, resolving           Subjective:  Patient is continuing and ongoing pain of the foot   She has pain in the right foot   She has low back pain   Patient did not get MRI    she declines injection therapy   Patient ID: Belén Piper is a 37 y o  female              Past Medical History:   Diagnosis Date    Acquired ankle/foot deformity       last assessed: 8/29/2017    Anxiety      Chronic pain disorder       right foot plantar    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    Dysfunctional uterine bleeding       Last assessed: 1/9/2018    Headache      Lupus      Mass of knee       last assessed: 5/7/2014    Mild acid reflux      Paronychia of finger       last assessed: 11/20/2013    Paronychia of toe       last asssessed: 9/28/2016   right foot    Patellofemoral dysfunction       last assessed: 3/21/2014    PCOS (polycystic ovarian syndrome) 12/15/2007    Pes planus, congenital       last assessed: 8/29/2017    Plantar fasciitis of right foot      Plantar fibromatosis       last assessed: 9/28/2017    PONV (postoperative nausea and vomiting)      Trichomoniasis       last assessed: 1/16/2015    Vaginal candidiasis       last assessed: 2016    Varicose veins of lower extremity       last assessed: 3/5/2014    Vertigo       last assessed: 3/5/2014    Viral gastroenteritis       last assessed: 2015                   Past Surgical History:   Procedure Laterality Date    BACK SURGERY         discectomy lumbar     SECTION         x2 bikini    PILONIDAL CYST EXCISION        OR HYSTEROSCOPY,W/ENDO BX N/A 10/18/2017     Procedure: HYSTEROSCOPY AND FRACTIONAL DILATATION AND CURRETTAGE;  Surgeon: Elias Hopkins MD;  Location: [facility] MAIN OR;  Service: Gynecology    TUBAL LIGATION       WISDOM TOOTH EXTRACTION             No Known Allergies        Current Outpatient Prescriptions:     albuterol (PROAIR HFA) 90 mcg/act inhaler, Inhale 2 puffs every 4 (four) hours as needed for wheezing (Patient not taking: Reported on 10/9/2018 ), Disp: 8 5 g, Rfl: 0    benzonatate (TESSALON) 200 MG capsule, Take 1 capsule (200 mg total) by mouth 3 (three) times a day as needed for cough, Disp: 30 capsule, Rfl: 3    cholecalciferol (VITAMIN D3) 1,000 units tablet, Take 1,000 Units by mouth daily, Disp: , Rfl:     fluticasone (FLOVENT HFA) 110 MCG/ACT inhaler, Inhale 1 puff 2 (two) times a day Rinse mouth after use , Disp: 1 Inhaler, Rfl: 0    gabapentin (NEURONTIN) 300 mg capsule, Take 1 capsule (300 mg total) by mouth 2 (two) times a day for 30 days, Disp: 60 capsule, Rfl: 0    gabapentin (NEURONTIN) 600 MG tablet, Take 1 tablet (600 mg total) by mouth 2 (two) times a day for 30 days, Disp: 60 tablet, Rfl: 0    hydroxychloroquine (PLAQUENIL) 200 mg tablet, Take 200 mg by mouth 2 (two) times a day, Disp: , Rfl: 0    naproxen (NAPROSYN) 500 mg tablet, Take 1 tablet (500 mg total) by mouth 2 (two) times a day with meals for 60 days, Disp: 60 tablet, Rfl: 1    omeprazole (PriLOSEC) 20 mg delayed release capsule, Take 20 mg by mouth as needed, Disp: , Rfl:     promethazine-codeine (PHENERGAN WITH CODEINE) 6 25-10 mg/5 mL syrup, Take 5 mL by mouth daily at bedtime as needed for cough, Disp: 120 mL, Rfl: 0    SUMAtriptan (IMITREX) 100 mg tablet, Take 1 tablet (100 mg total) by mouth once as needed for migraine for up to 1 dose Within 30 min onset of headache, Disp: 9 tablet, Rfl: 1           Patient Active Problem List   Diagnosis    Plantar fasciitis    Pain in both feet    Congenital pes planus of right foot    Congenital pes planus of left foot    Radiculopathy of lumbar region    Difficulty walking    Tarsal tunnel syndrome of right side    Closed nondisplaced fracture of body of right calcaneus    Other forms of systemic lupus erythematosus ([facility] [address] )    Right foot pain    Common migraine without aura    Generalized anxiety disorder         HPI     The following portions of the patient's history were reviewed and updated as appropriate: allergies, current medications, past family history, past medical history, past social history, past surgical history and problem list      Review of Systems       Historical Information        Past Medical History:   Diagnosis Date    Acquired ankle/foot deformity       last assessed: 8/29/2017    Anxiety      Chronic pain disorder       right foot plantar    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    Dysfunctional uterine bleeding       Last assessed: 1/9/2018    Headache      Lupus      Mass of knee       last assessed: 5/7/2014    Mild acid reflux      Paronychia of finger       last assessed: 11/20/2013    Paronychia of toe       last asssessed: 9/28/2016   right foot    Patellofemoral dysfunction       last assessed: 3/21/2014    PCOS (polycystic ovarian syndrome) 12/15/2007    Pes planus, congenital       last assessed: 8/29/2017    Plantar fasciitis of right foot      Plantar fibromatosis       last assessed: 9/28/2017    PONV (postoperative nausea and vomiting)      Trichomoniasis       last assessed: 1/16/2015    Vaginal candidiasis       last assessed: 11/30/2016   Lidia Musa Varicose veins of lower extremity       last assessed: 3/5/2014    Vertigo       last assessed: 3/5/2014    Viral gastroenteritis       last assessed: 2015                Past Surgical History:   Procedure Laterality Date    BACK SURGERY         discectomy lumbar     SECTION         x2 bikini    PILONIDAL CYST EXCISION        CT HYSTEROSCOPY,W/ENDO BX N/A 10/18/2017     Procedure: HYSTEROSCOPY AND FRACTIONAL DILATATION AND CURRETTAGE;  Surgeon: Imani Devine MD;  Location: [facility] MAIN OR;  Service: Gynecology    TUBAL LIGATION   2016    WISDOM TOOTH EXTRACTION          [address]          History   Alcohol Use    Yes       Comment: occ            History   Drug Use No      Family History:             Family History   Problem Relation Age of Onset    Cancer Mother           skin , gallbladder    Liver disease Mother      Hypertension Father      Heart disease Sister      Cancer Brother           skin    No Known Problems Daughter      No Known Problems Son      No Known Problems Son      Colon cancer Maternal Uncle      Arthritis Family      Lung cancer Family      Uterine cancer Family           Meds/Allergies   No Known Allergies                 Current Outpatient Prescriptions on File Prior to Visit   Medication Sig Dispense Refill    albuterol (PROAIR HFA) 90 mcg/act inhaler Inhale 2 puffs every 4 (four) hours as needed for wheezing (Patient not taking: Reported on 10/9/2018 ) 8 5 g 0    benzonatate (TESSALON) 200 MG capsule Take 1 capsule (200 mg total) by mouth 3 (three) times a day as needed for cough 30 capsule 3    cholecalciferol (VITAMIN D3) 1,000 units tablet Take 1,000 Units by mouth daily        fluticasone (FLOVENT HFA) 110 MCG/ACT inhaler Inhale 1 puff 2 (two) times a day Rinse mouth after use   1 Inhaler 0    gabapentin (NEURONTIN) 300 mg capsule Take 1 capsule (300 mg total) by mouth 2 (two) times a day for 30 days 60 capsule 0    hydroxychloroquine (PLAQUENIL) 200 mg tablet Take 200 mg by mouth 2 (two) times a day   0    naproxen (NAPROSYN) 500 mg tablet Take 1 tablet (500 mg total) by mouth 2 (two) times a day with meals for 60 days 60 tablet 1    omeprazole (PriLOSEC) 20 mg delayed release capsule Take 20 mg by mouth as needed        promethazine-codeine (PHENERGAN WITH CODEINE) 6 25-10 mg/5 mL syrup Take 5 mL by mouth daily at bedtime as needed for cough 120 mL 0    SUMAtriptan (IMITREX) 100 mg tablet Take 1 tablet (100 mg total) by mouth once as needed for migraine for up to 1 dose Within 30 min onset of headache 9 tablet 1      No current facility-administered medications on file prior to visit              Objective:  Patient's shoes and socks removed    Foot ExamPhysical Exam              Foot Exam     General  General Appearance: appears stated age and healthy   Orientation: alert and oriented to person, place, and time   Affect: appropriate   Gait: antalgic         Right Foot/Ankle      Inspection and Palpation  Ecchymosis: none  Tenderness: plantar fascia   Swelling: plantar fascia   Arch: pes planus  Hammertoes: fifth toe  Hallux valgus: no  Hallux limitus: yes  Skin Exam: skin intact;      Neurovascular  Dorsalis pedis: 3+  Posterior tibial: 3+  Superficial peroneal nerve sensation: diminished  Deep peroneal nerve sensation: diminished  Achilles reflex: 1+     Muscle Strength  Ankle dorsiflexion: 4+     Tests  PT Tinel's sign: negative    Too many toes: positive      Comments  Pain with palpation heel   4/5 L5 testing bilateral      Left Foot/Ankle       Inspection and Palpation  Ecchymosis: none  Tenderness: plantar fascia   Swelling: none   Arch: pes planus  Hammertoes: fifth toe  Hallux valgus: no  Hallux limitus: yes  Skin Exam: skin intact;      Neurovascular  Dorsalis pedis: 3+  Posterior tibial: 3+  Superficial peroneal nerve sensation: diminished  Deep peroneal nerve sensation: diminished  Achilles reflex: 1+     Muscle Strength  Ankle dorsiflexion: 4+     Tests  Too many toes: positive         Physical Exam   Cardiovascular:   Pulses:       Dorsalis pedis pulses are 3+ on the right side, and 3+ on the left side         Posterior tibial pulses are 3+ on the right side, and 3+ on the left side     Neurological:   Reflex Scores:       Achilles reflexes are 1+ on the right side and 1+ on the left side

## 2019-01-21 ENCOUNTER — OFFICE VISIT (OUTPATIENT)
Dept: OBGYN CLINIC | Facility: CLINIC | Age: 44
End: 2019-01-21
Payer: COMMERCIAL

## 2019-01-21 ENCOUNTER — APPOINTMENT (OUTPATIENT)
Dept: RADIOLOGY | Facility: CLINIC | Age: 44
End: 2019-01-21
Payer: COMMERCIAL

## 2019-01-21 VITALS
HEIGHT: 66 IN | SYSTOLIC BLOOD PRESSURE: 110 MMHG | DIASTOLIC BLOOD PRESSURE: 70 MMHG | WEIGHT: 194.2 LBS | BODY MASS INDEX: 31.21 KG/M2

## 2019-01-21 DIAGNOSIS — G89.29 CHRONIC HEEL PAIN, RIGHT: ICD-10-CM

## 2019-01-21 DIAGNOSIS — M79.671 CHRONIC HEEL PAIN, RIGHT: ICD-10-CM

## 2019-01-21 DIAGNOSIS — M79.671 CHRONIC HEEL PAIN, RIGHT: Primary | ICD-10-CM

## 2019-01-21 DIAGNOSIS — M72.2 PLANTAR FASCIITIS OF RIGHT FOOT: ICD-10-CM

## 2019-01-21 DIAGNOSIS — G89.29 CHRONIC HEEL PAIN, RIGHT: Primary | ICD-10-CM

## 2019-01-21 PROCEDURE — 73630 X-RAY EXAM OF FOOT: CPT

## 2019-01-21 PROCEDURE — 99243 OFF/OP CNSLTJ NEW/EST LOW 30: CPT | Performed by: ORTHOPAEDIC SURGERY

## 2019-01-21 RX ORDER — FOLIC ACID 1 MG/1
TABLET ORAL DAILY
COMMUNITY
Start: 2019-01-20

## 2019-01-21 RX ORDER — BIOTIN 10 MG
TABLET ORAL
COMMUNITY

## 2019-01-21 NOTE — LETTER
January 21, 2019     Asuncion Rogers, [id number] [facility] [address]    Patient: Charisse Veras   YOB: 1975   Date of Visit: 1/21/2019       Dear Dr Meena Handy: Thank you for referring Charisse Veras to me for evaluation  Below are the relevant portions of my assessment and plan of care  Nj Cullen upon examination and x-ray of her right foot demonstrates signs and symptoms consistent with right foot plantar fasciitis  She does demonstrate moderate point tenderness along the medial aspect of calcaneus  I did discuss with her that she has nearly completed conservative measurements except for the use of a night splint  I would like Nj Cullen to use the night splint every night and progressively increase the tension on the brace to help stretch her plantar fascia  She has completed physical therapy with mild improvement with recession of pain from the arch of her foot  She demonstrates a mild heel spur on x-rays today  I would like to order Nj Cullen an MRI of her right foot to question a stress fracture versus plantar fasciitis  I did provided with this prescription today  I would like to see Nj Cullen back when she has the MRI complete  Subjective:   Charisse Veras is a 37 y o  female who presents for initial evaluation of her right foot  She is referred to us today by Dr Jalyn Galicia  She states she has been experiencing intermittent mild to moderate sharp pain that originates at her right he heal and radiates into her arch  This has been ongoing for approximately 2 years  She notes that she has been seeing Dr Jalyn Galicia for approximately 1 year has been treated with cortisone injections, foot orthotic, and physical therapy  She states that the pain into her arch has subsided however is still experiencing sharp burning pain into her heel  She states this is exacerbated with prolonged weight-bearing or the use of her boot  She also notes pain at her heel with dorsiflexion of her foot  Sherly Figueroa does note that she has circulatory issues into her extremities however denies this into her lower extremity  Today she denies any distal paresthesias  If you have questions, please do not hesitate to call me  I look forward to following Sherly Figueroa along with you           Sincerely,        Riaz Chappell MD        CC: Jeanie Burrows DPM

## 2019-01-21 NOTE — PROGRESS NOTES
Assessment/Plan:  1  Chronic heel pain, right  XR foot 3+ vw right    MRI foot/forefoot toest right wo contrast   2  Plantar fasciitis of right foot  MRI foot/forefoot toest right wo contrast       Scribe Attestation    I,:   Melandreas  am acting as a scribe while in the presence of the attending physician :        I,:   Selena Ahumada MD personally performed the services described in this documentation    as scribed in my presence :              Jose Anna upon examination and x-ray of her right foot demonstrates signs and symptoms consistent with right foot plantar fasciitis  She does demonstrate moderate tenderness along the medial aspect of calcaneus  I did discuss with her that she has nearly completed conservative measurements except for a night splint  I would like Jose Anna to use the night splint every night and progressively increase the tension on the brace to help stretch her plantar fascia  She has completed physical therapy with mild improvement with recession of pain from the arch of her foot  She demonstrates a mild heel spur on x-rays today  I would like to order Jose Anna an MRI of her right foot to question a stress fracture versus plantar fasciitis  I did provided with this prescription today  I would like to see Jose Anna back when she has the MRI complete  Subjective:   Reyna Medel is a 37 y o  female who presents for initial evaluation of her right foot  She is referred to us today by Dr Aleah Griggs  She states she has been experiencing intermittent mild to moderate sharp pain that originates at her right he heal and radiates into her arch  This has been ongoing for approximately 2 years  She notes that she has been seeing Dr Aleah Griggs for approximately 1 year has been treated with cortisone injections, foot orthotic, and physical therapy  She states that the pain into her arch has subsided however is still experiencing sharp burning pain into her heel    She states this is exacerbated with prolonged weight-bearing or the use of her boot  She also notes pain at her heel with dorsiflexion of her foot  Joey Naranjo does note that she has circulatory issues into her extremities however denies this into her lower extremity  Today she denies any distal paresthesias  Review of Systems   Constitutional: Negative for chills, fever and unexpected weight change  HENT: Negative for hearing loss, nosebleeds and sore throat  Eyes: Negative for pain, redness and visual disturbance  Respiratory: Negative for cough, shortness of breath and wheezing  Cardiovascular: Negative for chest pain, palpitations and leg swelling  Gastrointestinal: Negative for abdominal pain, nausea and vomiting  Endocrine: Negative for polydipsia and polyuria  Genitourinary: Negative for dysuria and hematuria  Musculoskeletal: Positive for myalgias  See HPI   Skin: Negative for rash and wound  Neurological: Negative for dizziness, numbness and headaches  Psychiatric/Behavioral: Negative for decreased concentration and suicidal ideas  The patient is not nervous/anxious  Past Medical History:   Diagnosis Date    Acquired ankle/foot deformity     last assessed: 8/29/2017    Anxiety     Chronic pain disorder     right foot plantar    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    Dysfunctional uterine bleeding     Last assessed: 1/9/2018    Headache     Lupus     Mass of knee     last assessed: 5/7/2014    Mild acid reflux     Paronychia of finger     last assessed: 11/20/2013    Paronychia of toe     last asssessed: 9/28/2016   right foot    Patellofemoral dysfunction     last assessed: 3/21/2014    PCOS (polycystic ovarian syndrome) 12/15/2007    Pes planus, congenital     last assessed: 8/29/2017    Plantar fasciitis of right foot     Plantar fibromatosis     last assessed: 9/28/2017    PONV (postoperative nausea and vomiting)     Trichomoniasis     last assessed: 1/16/2015    Vaginal candidiasis last assessed: 2016    Varicose veins of lower extremity     last assessed: 3/5/2014    Vertigo     last assessed: 3/5/2014    Viral gastroenteritis     last assessed: 2015       Past Surgical History:   Procedure Laterality Date    BACK SURGERY      discectomy lumbar     SECTION      x2 bikini    PILONIDAL CYST EXCISION      MD HYSTEROSCOPY,W/ENDO BX N/A 10/18/2017    Procedure: HYSTEROSCOPY AND FRACTIONAL DILATATION AND CURRETTAGE;  Surgeon: Samuel Peñaloza MD;  [facility] MA[facility] OR;  Service: Gynecology    TUBAL LIGATION  2016    WISDOM TOOTH EXTRACTION         Family History   Problem Relation Age of Onset    Cancer Mother         skin , gallbladder    Liver disease Mother     Hypertension Father     Heart disease Sister     Cancer Brother         skin    No Known Problems Daughter     No Known Problems Son     No Known Problems Son     Colon cancer Maternal Uncle     Arthritis Family     Lung cancer Family     Uterine cancer Family     No Known Problems Maternal Aunt     No Known Problems Paternal Aunt     No Known Problems Paternal Uncle     No Known Problems Maternal Grandmother     No Known Problems Maternal Grandfather     No Known Problems Paternal Grandmother     No Known Problems Paternal Grandfather     ADD / ADHD Neg Hx     Anesthesia problems Neg Hx     Clotting disorder Neg Hx     Collagen disease Neg Hx     Diabetes Neg Hx     Dislocations Neg Hx     Learning disabilities Neg Hx     Neurological problems Neg Hx     Osteoporosis Neg Hx     Rheumatologic disease Neg Hx     Scoliosis Neg Hx     Vascular Disease Neg Hx        Social History     Occupational History    Not on file       Social History Main Topics    Smoking status: Former Smoker     Packs/day: 0 50     Years: 20 00     Quit date:     Smokeless tobacco: Never Used    Alcohol use Yes      Comment: occ    Drug use: No    Sexual activity: Not on file         Current Outpatient Prescriptions:     benzonatate (TESSALON) 200 MG capsule, Take 1 capsule (200 mg total) by mouth 3 (three) times a day as needed for cough, Disp: 30 capsule, Rfl: 3    Biotin 10 MG TABS, Take by mouth, Disp: , Rfl:     cholecalciferol (VITAMIN D3) 1,000 units tablet, Take 1,000 Units by mouth daily, Disp: , Rfl:     fluticasone (FLONASE) 50 mcg/act nasal spray, instill 2 sprays into each nostril once daily, Disp: 16 g, Rfl: 0    fluticasone (FLOVENT HFA) 110 MCG/ACT inhaler, Inhale 1 puff 2 (two) times a day Rinse mouth after use , Disp: 1 Inhaler, Rfl: 0    folic acid (FOLVITE) 1 mg tablet, , Disp: , Rfl:     gabapentin (NEURONTIN) 600 MG tablet, Take 1 tablet (600 mg total) by mouth 2 (two) times a day for 30 days, Disp: 60 tablet, Rfl: 0    hydroxychloroquine (PLAQUENIL) 200 mg tablet, Take 200 mg by mouth 2 (two) times a day, Disp: , Rfl: 0    naproxen (NAPROSYN) 500 mg tablet, Take 1 tablet (500 mg total) by mouth 2 (two) times a day with meals for 60 days, Disp: 60 tablet, Rfl: 0    omeprazole (PriLOSEC) 20 mg delayed release capsule, Take 1 capsule (20 mg total) by mouth daily for 30 days, Disp: 30 capsule, Rfl: 0    SUMAtriptan (IMITREX) 100 mg tablet, Take 1 tablet (100 mg total) by mouth once as needed for migraine for up to 1 dose Within 30 min onset of headache, Disp: 9 tablet, Rfl: 0    albuterol (PROAIR HFA) 90 mcg/act inhaler, Inhale 2 puffs every 4 (four) hours as needed for wheezing (Patient not taking: Reported on 10/9/2018 ), Disp: 8 5 g, Rfl: 0    No Known Allergies    Objective:  Vitals:    01/21/19 1014   BP: 110/70       Right Ankle Exam   Swelling: none    Tenderness   Right ankle tenderness location: calcaneal medial tubricle          Range of Motion   Dorsiflexion: 25   Plantar flexion: 45   Inversion: 45   Eversion: 25     Muscle Strength   Dorsiflexion:  5/5  Plantar flexion:  5/5  Anterior tibial:  5/5    Tests   Anterior drawer: negative  Varus tilt: negative    Other   Erythema: absent  Scars: absent  Sensation: normal  Pulse: present           Observations     Right Ankle/Foot   Negative for adhesive scar  Strength/Myotome Testing     Right Ankle/Foot   Dorsiflexion: 5  Plantar flexion: 5      Physical Exam   Constitutional: She is oriented to person, place, and time  She appears well-developed and well-nourished  HENT:   Head: Normocephalic and atraumatic  Eyes: Conjunctivae are normal  Right eye exhibits no discharge  Left eye exhibits no discharge  Neck: Normal range of motion  Neck supple  Cardiovascular: Normal rate  Pulmonary/Chest: Effort normal    Musculoskeletal:   As noted in HPI   Neurological: She is alert and oriented to person, place, and time  Skin: Skin is warm and dry  Psychiatric: She has a normal mood and affect  Her behavior is normal  Judgment and thought content normal    Nursing note and vitals reviewed  I have personally reviewed pertinent films in PACS and my interpretation is as follows:    X-ray of the right foot demonstrates mild heel spurring at the plantar aspect of the calcaneus  Otherwise no acute fracture other osseous abnormalities noted

## 2019-01-22 DIAGNOSIS — M72.2 PLANTAR FASCIITIS: ICD-10-CM

## 2019-01-23 RX ORDER — NAPROXEN 500 MG/1
TABLET ORAL
Qty: 60 TABLET | Refills: 0 | Status: SHIPPED | OUTPATIENT
Start: 2019-01-23

## 2019-01-28 NOTE — PROGRESS NOTES
Update 1/28/19: Pt had called to cancel all remaining appts  Significant progress had not been made in her time in skilled PT and she wanted to pursue other options  Pt chart will be closed today as a result

## 2019-01-30 ENCOUNTER — HOSPITAL ENCOUNTER (OUTPATIENT)
Dept: RADIOLOGY | Facility: HOSPITAL | Age: 44
Discharge: HOME | End: 2019-01-30
Attending: ORTHOPAEDIC SURGERY
Payer: COMMERCIAL

## 2019-01-30 DIAGNOSIS — G89.29 CHRONIC HEEL PAIN, RIGHT: ICD-10-CM

## 2019-01-30 DIAGNOSIS — M79.671 CHRONIC HEEL PAIN, RIGHT: ICD-10-CM

## 2019-01-30 DIAGNOSIS — M72.2 PLANTAR FASCIITIS OF RIGHT FOOT: ICD-10-CM

## 2019-01-30 PROCEDURE — 73721 MRI JNT OF LWR EXTRE W/O DYE: CPT

## 2019-01-31 ENCOUNTER — TELEPHONE (OUTPATIENT)
Dept: OBGYN CLINIC | Facility: CLINIC | Age: 44
End: 2019-01-31

## 2019-01-31 NOTE — TELEPHONE ENCOUNTER
Patient called back and was given results  I advised to keep her follow up appointment so she can discuss treatment for this with Dr Marisol Ribeiro

## 2019-01-31 NOTE — TELEPHONE ENCOUNTER
Attempted to call patient and advise results for her MRI of the  left ankle  LMOM with office call back number   Patient has follow up apt scheduled 2/6 at 10:45   ----- Message from Slava Yi PA-C sent at 1/31/2019  9:42 AM EST -----  Plantar fasciitis   Otherwise normal    ----- Message -----  From: Interface, Radiology Results In  Sent: 1/31/2019   8:22 AM  To: Katie Graf MD

## 2019-02-22 ENCOUNTER — OFFICE VISIT (OUTPATIENT)
Dept: OBGYN CLINIC | Facility: CLINIC | Age: 44
End: 2019-02-22
Payer: COMMERCIAL

## 2019-02-22 VITALS
HEART RATE: 64 BPM | BODY MASS INDEX: 32.11 KG/M2 | SYSTOLIC BLOOD PRESSURE: 100 MMHG | WEIGHT: 199.8 LBS | HEIGHT: 66 IN | DIASTOLIC BLOOD PRESSURE: 63 MMHG

## 2019-02-22 DIAGNOSIS — M72.2 PLANTAR FASCIITIS OF RIGHT FOOT: Primary | ICD-10-CM

## 2019-02-22 PROCEDURE — 99213 OFFICE O/P EST LOW 20 MIN: CPT | Performed by: ORTHOPAEDIC SURGERY

## 2019-02-22 NOTE — PROGRESS NOTES
Assessment/Plan:  1  Plantar fasciitis of right foot       Unfortunately Arturo Reynolds continues to struggle despite extensive conservative treatment thus far  There are no further conservative options that we have not tired, except for shock wave therapy, though we are unsure who performs this in the area  At this point, we are referring her back to podiatrist, Dr Geo Galeas, to discuss possible plantar fascia release  We will see her back as needed for this  Subjective:   Adrián Lemus is a 37 y o  female who presents today for follow-up of her right foot  We have been treating her conservatively for plantar fasciitis, and she did have an MRI that confirmed this  We are able to view these images today  There is no evidence of calcaneal stress fracture  Unfortunately, her 8 weeks of physical therapy did not help her significantly  She has only been able to use her night splint for about an hour or so at a time, as she feels that it causes numbness and a cold feeling into the foot  She has also seen podiatry and had about 6 steroid injections for this  She continues to note pain about the plantar aspect of her heel which is worse with ambulation and slightly better with rest     Review of Systems   Constitutional: Negative for chills, fever and unexpected weight change  HENT: Negative for hearing loss, nosebleeds and sore throat  Eyes: Negative for pain, redness and visual disturbance  Respiratory: Negative for cough, shortness of breath and wheezing  Cardiovascular: Negative for chest pain, palpitations and leg swelling  Gastrointestinal: Negative for abdominal pain, nausea and vomiting  Endocrine: Negative for polydipsia and polyuria  Genitourinary: Negative for dysuria and hematuria  Musculoskeletal:        See HPI   Skin: Negative for rash and wound  Neurological: Negative for dizziness, numbness and headaches     Psychiatric/Behavioral: Negative for decreased concentration and suicidal ideas  The patient is not nervous/anxious  Past Medical History:   Diagnosis Date    Acquired ankle/foot deformity     last assessed: 2017    Anxiety     Chronic pain disorder     right foot plantar    Dermatomycosis 2008    Dysfunctional uterine bleeding     Last assessed: 2018    Headache     Lupus     Mass of knee     last assessed: 2014    Mild acid reflux     Paronychia of finger     last assessed: 2013    Paronychia of toe     last asssessed: 2016   right foot    Patellofemoral dysfunction     last assessed: 3/21/2014    PCOS (polycystic ovarian syndrome) 12/15/2007    Pes planus, congenital     last assessed: 2017    Plantar fasciitis of right foot     Plantar fibromatosis     last assessed: 2017    PONV (postoperative nausea and vomiting)     Trichomoniasis     last assessed: 2015    Vaginal candidiasis     last assessed: 2016    Varicose veins of lower extremity     last assessed: 3/5/2014    Vertigo     last assessed: 3/5/2014    Viral gastroenteritis     last assessed: 2015       Past Surgical History:   Procedure Laterality Date    BACK SURGERY      discectomy lumbar     SECTION      x2 bikini    PILONIDAL CYST EXCISION      SD HYSTEROSCOPY,W/ENDO BX N/A 10/18/2017    Procedure: HYSTEROSCOPY AND FRACTIONAL DILATATION AND CURRETTAGE;  Surgeon: Doug Cox MD;  Location: [facility] MAIN OR;  Service: Gynecology    TUBAL LIGATION  2016    WISDOM TOOTH EXTRACTION         Family History   Problem Relation Age of Onset    Cancer Mother         skin , gallbladder    Liver disease Mother     Hypertension Father     Heart disease Sister     Cancer Brother         skin    No Known Problems Daughter     No Known Problems Son     No Known Problems Son     Colon cancer Maternal Uncle     Arthritis Family     Lung cancer Family     Uterine cancer Family     No Known Problems Maternal Aunt     No Known Problems Paternal Aunt     No Known Problems Paternal Uncle     No Known Problems Maternal Grandmother     No Known Problems Maternal Grandfather     No Known Problems Paternal Grandmother     No Known Problems Paternal Grandfather     ADD / ADHD Neg Hx     Anesthesia problems Neg Hx     Clotting disorder Neg Hx     Collagen disease Neg Hx     Diabetes Neg Hx     Dislocations Neg Hx     Learning disabilities Neg Hx     Neurological problems Neg Hx     Osteoporosis Neg Hx     Rheumatologic disease Neg Hx     Scoliosis Neg Hx     Vascular Disease Neg Hx        Social History     Occupational History    Not on file   Tobacco Use    Smoking status: Former Smoker     Packs/day: 0 50     Years: 20 00     Pack years: 10 00     Last attempt to quit:      Years since quittin 1    Smokeless tobacco: Never Used   Substance and Sexual Activity    Alcohol use: Yes     Comment: occ    Drug use: No    Sexual activity: Not on file         Current Outpatient Medications:     albuterol (PROAIR HFA) 90 mcg/act inhaler, Inhale 2 puffs every 4 (four) hours as needed for wheezing (Patient taking differently: Inhale 2 puffs as needed for wheezing ), Disp: 8 5 g, Rfl: 0    benzonatate (TESSALON) 200 MG capsule, Take 1 capsule (200 mg total) by mouth 3 (three) times a day as needed for cough, Disp: 30 capsule, Rfl: 3    Biotin 10 MG TABS, Take by mouth, Disp: , Rfl:     cholecalciferol (VITAMIN D3) 1,000 units tablet, Take 1,000 Units by mouth daily, Disp: , Rfl:     fluticasone (FLONASE) 50 mcg/act nasal spray, instill 2 sprays into each nostril once daily, Disp: 16 g, Rfl: 0    fluticasone (FLOVENT HFA) 110 MCG/ACT inhaler, Inhale 1 puff 2 (two) times a day Rinse mouth after use , Disp: 1 Inhaler, Rfl: 0    folic acid (FOLVITE) 1 mg tablet, , Disp: , Rfl:     hydroxychloroquine (PLAQUENIL) 200 mg tablet, Take 200 mg by mouth 2 (two) times a day, Disp: , Rfl: 0    naproxen (NAPROSYN) 500 mg tablet, take 1 tablet by mouth twice a day with MEALS, Disp: 60 tablet, Rfl: 0    SUMAtriptan (IMITREX) 100 mg tablet, Take 1 tablet (100 mg total) by mouth once as needed for migraine for up to 1 dose Within 30 min onset of headache, Disp: 9 tablet, Rfl: 0    gabapentin (NEURONTIN) 600 MG tablet, Take 1 tablet (600 mg total) by mouth 2 (two) times a day for 30 days, Disp: 60 tablet, Rfl: 0    omeprazole (PriLOSEC) 20 mg delayed release capsule, Take 1 capsule (20 mg total) by mouth daily for 30 days, Disp: 30 capsule, Rfl: 0    No Known Allergies    Objective:  Vitals:    02/22/19 1211   BP: 100/63   Pulse: 64       Ortho Exam    Physical Exam   Constitutional: She is oriented to person, place, and time  She appears well-developed and well-nourished  No distress  HENT:   Head: Normocephalic and atraumatic  Eyes: Conjunctivae and EOM are normal  No scleral icterus  Neck: No JVD present  Cardiovascular: Intact distal pulses  Pulmonary/Chest: Effort normal  No respiratory distress  Abdominal: Soft  She exhibits no distension  Neurological: She is alert and oriented to person, place, and time  Coordination normal    Skin: Skin is warm  Psychiatric: She has a normal mood and affect  Right foot: Tenderness insertion of plantar fascia on calcaneus  Near full ROM of the ankle  Sensation intact right foot  2+ DP pulse  I have personally reviewed pertinent films in PACS and my interpretation is as follows:  MRI right foot:  Plantar fasciitis

## 2019-02-26 DIAGNOSIS — G43.009 MIGRAINE WITHOUT AURA AND WITHOUT STATUS MIGRAINOSUS, NOT INTRACTABLE: ICD-10-CM

## 2019-02-26 RX ORDER — SUMATRIPTAN 100 MG/1
100 TABLET, FILM COATED ORAL ONCE AS NEEDED
Qty: 9 TABLET | Refills: 0 | Status: SHIPPED | OUTPATIENT
Start: 2019-02-26 | End: 2019-04-29 | Stop reason: SDUPTHER

## 2019-03-04 ENCOUNTER — OFFICE VISIT (OUTPATIENT)
Dept: FAMILY MEDICINE CLINIC | Facility: CLINIC | Age: 44
End: 2019-03-04
Payer: COMMERCIAL

## 2019-03-04 VITALS
SYSTOLIC BLOOD PRESSURE: 110 MMHG | BODY MASS INDEX: 31.82 KG/M2 | DIASTOLIC BLOOD PRESSURE: 70 MMHG | HEART RATE: 88 BPM | HEIGHT: 66 IN | RESPIRATION RATE: 16 BRPM | WEIGHT: 198 LBS | TEMPERATURE: 97.2 F

## 2019-03-04 DIAGNOSIS — R10.9 FLANK PAIN: ICD-10-CM

## 2019-03-04 DIAGNOSIS — M54.50 BILATERAL LOW BACK PAIN WITHOUT SCIATICA, UNSPECIFIED CHRONICITY: Primary | ICD-10-CM

## 2019-03-04 LAB
SL AMB  POCT GLUCOSE, UA: NORMAL
SL AMB LEUKOCYTE ESTERASE,UA: NORMAL
SL AMB POCT BILIRUBIN,UA: NORMAL
SL AMB POCT BLOOD,UA: NORMAL
SL AMB POCT CLARITY,UA: NORMAL
SL AMB POCT COLOR,UA: YELLOW
SL AMB POCT KETONES,UA: NORMAL
SL AMB POCT NITRITE,UA: NORMAL
SL AMB POCT PH,UA: 5
SL AMB POCT SPECIFIC GRAVITY,UA: 1.01
SL AMB POCT URINE PROTEIN: NORMAL
SL AMB POCT UROBILINOGEN: NORMAL

## 2019-03-04 PROCEDURE — 81003 URINALYSIS AUTO W/O SCOPE: CPT | Performed by: FAMILY MEDICINE

## 2019-03-04 PROCEDURE — 99214 OFFICE O/P EST MOD 30 MIN: CPT | Performed by: FAMILY MEDICINE

## 2019-03-04 RX ORDER — IBUPROFEN 600 MG/1
600 TABLET ORAL EVERY 8 HOURS PRN
Qty: 30 TABLET | Refills: 0 | Status: SHIPPED | OUTPATIENT
Start: 2019-03-04

## 2019-03-04 RX ORDER — CYCLOBENZAPRINE HCL 5 MG
10 TABLET ORAL 3 TIMES DAILY PRN
Qty: 30 TABLET | Refills: 0 | Status: SHIPPED | OUTPATIENT
Start: 2019-03-04

## 2019-03-04 NOTE — PROGRESS NOTES
Chief Complaint   Patient presents with    Back Pain     lower back         Patient ID: Darius Cao is a 37 y o  female  HPI  Pt is seeing for low and middle back pain started acutely 3 days ago -  No  symptoms, no N/V, started amoxi  For "possible UTI" -  Lifting a lot at home -  Tried heating pad -  H/o prior back surgery  -  symptoms are worsening with bending over, changing position in bed     The following portions of the patient's history were reviewed and updated as appropriate: allergies, current medications, past family history, past medical history, past social history, past surgical history and problem list     Review of Systems   Constitutional: Negative  Respiratory: Negative  Cardiovascular: Negative  Gastrointestinal: Negative  Genitourinary: Negative  Musculoskeletal: Positive for back pain  Negative for gait problem  Skin: Negative  Neurological: Negative  Current Outpatient Medications   Medication Sig Dispense Refill    Biotin 10 MG TABS Take by mouth      cholecalciferol (VITAMIN D3) 1,000 units tablet Take 1,000 Units by mouth daily      fluticasone (FLONASE) 50 mcg/act nasal spray instill 2 sprays into each nostril once daily 16 g 0    fluticasone (FLOVENT HFA) 110 MCG/ACT inhaler Inhale 1 puff 2 (two) times a day Rinse mouth after use   1 Inhaler 0    folic acid (FOLVITE) 1 mg tablet       gabapentin (NEURONTIN) 600 MG tablet Take 1 tablet (600 mg total) by mouth 2 (two) times a day for 30 days 60 tablet 0    hydroxychloroquine (PLAQUENIL) 200 mg tablet Take 200 mg by mouth 2 (two) times a day  0    naproxen (NAPROSYN) 500 mg tablet take 1 tablet by mouth twice a day with MEALS 60 tablet 0    omeprazole (PriLOSEC) 20 mg delayed release capsule Take 1 capsule (20 mg total) by mouth daily for 30 days 30 capsule 0    SUMAtriptan (IMITREX) 100 mg tablet Take 1 tablet (100 mg total) by mouth once as needed for migraine for up to 1 dose Within 30 min onset of headache 9 tablet 0    albuterol (PROAIR HFA) 90 mcg/act inhaler Inhale 2 puffs every 4 (four) hours as needed for wheezing (Patient not taking: Reported on 3/4/2019) 8 5 g 0    benzonatate (TESSALON) 200 MG capsule Take 1 capsule (200 mg total) by mouth 3 (three) times a day as needed for cough (Patient not taking: Reported on 3/4/2019) 30 capsule 3     No current facility-administered medications for this visit  Objective:    /70 (BP Location: Left arm, Patient Position: Sitting, Cuff Size: Large)   Pulse 88   Temp (!) 97 2 °F (36 2 °C) (Tympanic)   Resp 16   Ht 5' 6" (1 676 m)   Wt 89 8 kg (198 lb)   BMI 31 96 kg/m²        Physical Exam   Constitutional: She is oriented to person, place, and time  She appears well-nourished  No distress  Cardiovascular: Normal rate and regular rhythm  Pulmonary/Chest: Effort normal    Abdominal: Soft  Musculoskeletal: She exhibits tenderness  She exhibits no deformity  Lumbar back: She exhibits decreased range of motion, tenderness and spasm  She exhibits no bony tenderness, no swelling, no edema and no deformity  Neurological: She is alert and oriented to person, place, and time  No cranial nerve deficit  Skin: Skin is warm and dry  Assessment/Plan:         Diagnoses and all orders for this visit:    Bilateral low back pain without sciatica, unspecified chronicity  -     cyclobenzaprine (FLEXERIL) 5 mg tablet; Take 2 tablets (10 mg total) by mouth 3 (three) times a day as needed for muscle spasms  -     ibuprofen (MOTRIN) 600 mg tablet; Take 1 tablet (600 mg total) by mouth every 8 (eight) hours as needed for mild pain    Flank pain  -     POCT urine dip auto non-scope  -     Urine culture;  Future      will consider PT  -  Declined today     rto prn                           Aimee Carrion MD

## 2019-03-06 ENCOUNTER — TELEPHONE (OUTPATIENT)
Dept: FAMILY MEDICINE CLINIC | Facility: CLINIC | Age: 44
End: 2019-03-06

## 2019-03-06 LAB
BACTERIA UR CULT: NORMAL
LABCORP COMMENT: NORMAL
Lab: NO GROWTH

## 2019-03-06 NOTE — TELEPHONE ENCOUNTER
----- Message from Jesse Mehta MD sent at 3/6/2019  1:04 PM EST -----  Pl, advise pt -  negative urine culture

## 2019-03-11 ENCOUNTER — HOSPITAL ENCOUNTER (OUTPATIENT)
Dept: RADIOLOGY | Facility: HOSPITAL | Age: 44
Discharge: HOME | End: 2019-03-11
Attending: FAMILY MEDICINE
Payer: COMMERCIAL

## 2019-03-11 ENCOUNTER — OFFICE VISIT (OUTPATIENT)
Dept: FAMILY MEDICINE CLINIC | Facility: CLINIC | Age: 44
End: 2019-03-11
Payer: COMMERCIAL

## 2019-03-11 VITALS
TEMPERATURE: 97.6 F | DIASTOLIC BLOOD PRESSURE: 60 MMHG | HEIGHT: 66 IN | WEIGHT: 200 LBS | HEART RATE: 96 BPM | RESPIRATION RATE: 16 BRPM | BODY MASS INDEX: 32.14 KG/M2 | SYSTOLIC BLOOD PRESSURE: 100 MMHG

## 2019-03-11 DIAGNOSIS — R10.32 LEFT GROIN PAIN: Primary | ICD-10-CM

## 2019-03-11 DIAGNOSIS — R10.32 LEFT GROIN PAIN: ICD-10-CM

## 2019-03-11 DIAGNOSIS — R10.32 LLQ ABDOMINAL PAIN: ICD-10-CM

## 2019-03-11 LAB
SL AMB  POCT GLUCOSE, UA: NORMAL
SL AMB LEUKOCYTE ESTERASE,UA: NORMAL
SL AMB POCT BILIRUBIN,UA: NORMAL
SL AMB POCT BLOOD,UA: NORMAL
SL AMB POCT CLARITY,UA: NORMAL
SL AMB POCT COLOR,UA: YELLOW
SL AMB POCT KETONES,UA: NORMAL
SL AMB POCT NITRITE,UA: NORMAL
SL AMB POCT PH,UA: 6
SL AMB POCT SPECIFIC GRAVITY,UA: 1.01
SL AMB POCT URINE PROTEIN: NORMAL
SL AMB POCT UROBILINOGEN: NORMAL

## 2019-03-11 PROCEDURE — 76856 US EXAM PELVIC COMPLETE: CPT

## 2019-03-11 PROCEDURE — 99213 OFFICE O/P EST LOW 20 MIN: CPT | Performed by: FAMILY MEDICINE

## 2019-03-11 PROCEDURE — 76830 TRANSVAGINAL US NON-OB: CPT

## 2019-03-11 PROCEDURE — 81003 URINALYSIS AUTO W/O SCOPE: CPT | Performed by: FAMILY MEDICINE

## 2019-03-15 ENCOUNTER — APPOINTMENT (EMERGENCY)
Dept: RADIOLOGY | Facility: HOSPITAL | Age: 44
End: 2019-03-15
Payer: COMMERCIAL

## 2019-03-15 ENCOUNTER — HOSPITAL ENCOUNTER (EMERGENCY)
Facility: HOSPITAL | Age: 44
Discharge: HOME | End: 2019-03-15
Attending: EMERGENCY MEDICINE
Payer: COMMERCIAL

## 2019-03-15 VITALS
DIASTOLIC BLOOD PRESSURE: 62 MMHG | RESPIRATION RATE: 18 BRPM | WEIGHT: 195 LBS | BODY MASS INDEX: 31.34 KG/M2 | HEIGHT: 66 IN | HEART RATE: 85 BPM | OXYGEN SATURATION: 96 % | TEMPERATURE: 98.2 F | SYSTOLIC BLOOD PRESSURE: 116 MMHG

## 2019-03-15 DIAGNOSIS — R10.9 ABDOMINAL PAIN: Primary | ICD-10-CM

## 2019-03-15 LAB
ANION GAP SERPL CALCULATED.3IONS-SCNC: 11 MMOL/L (ref 4–13)
BACTERIA UR QL AUTO: ABNORMAL /HPF
BASOPHILS # BLD AUTO: 0.05 THOUSANDS/ΜL (ref 0–0.1)
BASOPHILS NFR BLD AUTO: 1 % (ref 0–1)
BILIRUB UR QL STRIP: NEGATIVE
BUN SERPL-MCNC: 18 MG/DL (ref 5–25)
CALCIUM SERPL-MCNC: 9.4 MG/DL (ref 8.3–10.1)
CHLORIDE SERPL-SCNC: 99 MMOL/L (ref 100–108)
CLARITY UR: CLEAR
CO2 SERPL-SCNC: 28 MMOL/L (ref 21–32)
COLOR UR: YELLOW
CREAT SERPL-MCNC: 0.9 MG/DL (ref 0.6–1.3)
EOSINOPHIL # BLD AUTO: 0.02 THOUSAND/ΜL (ref 0–0.61)
EOSINOPHIL NFR BLD AUTO: 0 % (ref 0–6)
ERYTHROCYTE [DISTWIDTH] IN BLOOD BY AUTOMATED COUNT: 12.5 % (ref 11.6–15.1)
EXT PREG TEST URINE: NEGATIVE
GFR SERPL CREATININE-BSD FRML MDRD: 79 ML/MIN/1.73SQ M
GLUCOSE SERPL-MCNC: 131 MG/DL (ref 65–140)
GLUCOSE UR STRIP-MCNC: NEGATIVE MG/DL
HCT VFR BLD AUTO: 39.5 % (ref 34.8–46.1)
HGB BLD-MCNC: 12.9 G/DL (ref 11.5–15.4)
HGB UR QL STRIP.AUTO: ABNORMAL
IMM GRANULOCYTES # BLD AUTO: 0.11 THOUSAND/UL (ref 0–0.2)
IMM GRANULOCYTES NFR BLD AUTO: 1 % (ref 0–2)
KETONES UR STRIP-MCNC: NEGATIVE MG/DL
LEUKOCYTE ESTERASE UR QL STRIP: NEGATIVE
LYMPHOCYTES # BLD AUTO: 1.2 THOUSANDS/ΜL (ref 0.6–4.47)
LYMPHOCYTES NFR BLD AUTO: 12 % (ref 14–44)
MCH RBC QN AUTO: 28.9 PG (ref 26.8–34.3)
MCHC RBC AUTO-ENTMCNC: 32.7 G/DL (ref 31.4–37.4)
MCV RBC AUTO: 89 FL (ref 82–98)
MONOCYTES # BLD AUTO: 0.16 THOUSAND/ΜL (ref 0.17–1.22)
MONOCYTES NFR BLD AUTO: 2 % (ref 4–12)
NEUTROPHILS # BLD AUTO: 8.15 THOUSANDS/ΜL (ref 1.85–7.62)
NEUTS SEG NFR BLD AUTO: 84 % (ref 43–75)
NITRITE UR QL STRIP: NEGATIVE
NON-SQ EPI CELLS URNS QL MICRO: ABNORMAL /HPF
NRBC BLD AUTO-RTO: 0 /100 WBCS
PH UR STRIP.AUTO: 5.5 [PH]
PLATELET # BLD AUTO: 279 THOUSANDS/UL (ref 149–390)
PMV BLD AUTO: 9.5 FL (ref 8.9–12.7)
POTASSIUM SERPL-SCNC: 4.1 MMOL/L (ref 3.5–5.3)
PROT UR STRIP-MCNC: NEGATIVE MG/DL
RBC # BLD AUTO: 4.46 MILLION/UL (ref 3.81–5.12)
RBC #/AREA URNS AUTO: ABNORMAL /HPF
SODIUM SERPL-SCNC: 138 MMOL/L (ref 136–145)
SP GR UR STRIP.AUTO: >=1.03 (ref 1–1.03)
UROBILINOGEN UR QL STRIP.AUTO: 0.2 E.U./DL
WBC # BLD AUTO: 9.69 THOUSAND/UL (ref 4.31–10.16)
WBC #/AREA URNS AUTO: ABNORMAL /HPF

## 2019-03-15 PROCEDURE — 99284 EMERGENCY DEPT VISIT MOD MDM: CPT

## 2019-03-15 PROCEDURE — 74177 CT ABD & PELVIS W/CONTRAST: CPT

## 2019-03-15 PROCEDURE — 96361 HYDRATE IV INFUSION ADD-ON: CPT

## 2019-03-15 PROCEDURE — 87147 CULTURE TYPE IMMUNOLOGIC: CPT | Performed by: PHYSICIAN ASSISTANT

## 2019-03-15 PROCEDURE — 80048 BASIC METABOLIC PNL TOTAL CA: CPT | Performed by: PHYSICIAN ASSISTANT

## 2019-03-15 PROCEDURE — 85025 COMPLETE CBC W/AUTO DIFF WBC: CPT | Performed by: PHYSICIAN ASSISTANT

## 2019-03-15 PROCEDURE — 87491 CHLMYD TRACH DNA AMP PROBE: CPT | Performed by: PHYSICIAN ASSISTANT

## 2019-03-15 PROCEDURE — 36415 COLL VENOUS BLD VENIPUNCTURE: CPT | Performed by: PHYSICIAN ASSISTANT

## 2019-03-15 PROCEDURE — 87077 CULTURE AEROBIC IDENTIFY: CPT | Performed by: PHYSICIAN ASSISTANT

## 2019-03-15 PROCEDURE — 87070 CULTURE OTHR SPECIMN AEROBIC: CPT | Performed by: PHYSICIAN ASSISTANT

## 2019-03-15 PROCEDURE — 81025 URINE PREGNANCY TEST: CPT | Performed by: PHYSICIAN ASSISTANT

## 2019-03-15 PROCEDURE — 87591 N.GONORRHOEAE DNA AMP PROB: CPT | Performed by: PHYSICIAN ASSISTANT

## 2019-03-15 PROCEDURE — 81001 URINALYSIS AUTO W/SCOPE: CPT | Performed by: PHYSICIAN ASSISTANT

## 2019-03-15 PROCEDURE — 96374 THER/PROPH/DIAG INJ IV PUSH: CPT

## 2019-03-15 RX ORDER — KETOROLAC TROMETHAMINE 30 MG/ML
15 INJECTION, SOLUTION INTRAMUSCULAR; INTRAVENOUS ONCE
Status: COMPLETED | OUTPATIENT
Start: 2019-03-15 | End: 2019-03-15

## 2019-03-15 RX ADMIN — SODIUM CHLORIDE 1000 ML: 0.9 INJECTION, SOLUTION INTRAVENOUS at 11:03

## 2019-03-15 RX ADMIN — IOHEXOL 100 ML: 350 INJECTION, SOLUTION INTRAVENOUS at 11:41

## 2019-03-15 RX ADMIN — KETOROLAC TROMETHAMINE 15 MG: 30 INJECTION, SOLUTION INTRAMUSCULAR at 11:08

## 2019-03-15 NOTE — ED PROVIDER NOTES
History  Chief Complaint   Patient presents with    Pelvic Pain     dx with cyst on fallopian tube Mon , c/o increased pressure in lower pelvic area  Pt unable to see gyno until mid April  PMD sent for further eval      37year old female hx PCOS presents with abdominal pain x1 week  She had sharp LLQ pain on Saturday, had an US earlier this week which showed a cyst on the fallopian tube per patient  Her gyn is Dr Mk Gloria who is unable to see her until mid April  Her pain changed from LLQ to the suprapubic area  She states there is occasional radiation to her RLQ  She is currently sexually active with one male partner, they do not use protection  She denies any vaginal pain, bleeding, discharge, or odors  She has not taken anything for the pain  She denies any fever, chills, chest pain, shortness of breath, difficulty breathing, nausea, vomiting, diarrhea, constipation, urinary burning, urgency, frequency, hesitancy, or hematuria  No dizziness, lightheadedness, weakness  LMP 2 weeks ago  Prior to Admission Medications   Prescriptions Last Dose Informant Patient Reported? Taking?    Biotin 10 MG TABS 3/15/2019 at Unknown time Self Yes Yes   Sig: Take by mouth   SUMAtriptan (IMITREX) 100 mg tablet 3/14/2019 at Unknown time Self No Yes   Sig: Take 1 tablet (100 mg total) by mouth once as needed for migraine for up to 1 dose Within 30 min onset of headache   albuterol (PROAIR HFA) 90 mcg/act inhaler More than a month at Unknown time Self No No   Sig: Inhale 2 puffs every 4 (four) hours as needed for wheezing   benzonatate (TESSALON) 200 MG capsule Not Taking at Unknown time Self No No   Sig: Take 1 capsule (200 mg total) by mouth 3 (three) times a day as needed for cough   Patient not taking: Reported on 3/4/2019   cholecalciferol (VITAMIN D3) 1,000 units tablet 3/15/2019 at Unknown time Self Yes Yes   Sig: Take 1,000 Units by mouth daily   cyclobenzaprine (FLEXERIL) 5 mg tablet Past Month at Unknown time No Yes   Sig: Take 2 tablets (10 mg total) by mouth 3 (three) times a day as needed for muscle spasms   fluticasone (FLONASE) 50 mcg/act nasal spray More than a month at Unknown time Self No No   Sig: instill 2 sprays into each nostril once daily   fluticasone (FLOVENT HFA) 110 MCG/ACT inhaler More than a month at Unknown time Self No No   Sig: Inhale 1 puff 2 (two) times a day Rinse mouth after use  folic acid (FOLVITE) 1 mg tablet 3/14/2019 at Unknown time Self Yes Yes   gabapentin (NEURONTIN) 600 MG tablet   No No   Sig: Take 1 tablet (600 mg total) by mouth 2 (two) times a day for 30 days   hydroxychloroquine (PLAQUENIL) 200 mg tablet 3/15/2019 at Unknown time Self Yes Yes   Sig: Take 200 mg by mouth 2 (two) times a day   ibuprofen (MOTRIN) 600 mg tablet Past Month at Unknown time  No Yes   Sig: Take 1 tablet (600 mg total) by mouth every 8 (eight) hours as needed for mild pain   methotrexate 2 5 mg tablet Past Week at Unknown time  Yes Yes   Sig: Take 10 mg by mouth once a week   naproxen (NAPROSYN) 500 mg tablet Not Taking at Unknown time Self No No   Sig: take 1 tablet by mouth twice a day with MEALS   Patient not taking: Reported on 3/11/2019   omeprazole (PriLOSEC) 20 mg delayed release capsule   No No   Sig: Take 1 capsule (20 mg total) by mouth daily for 30 days      Facility-Administered Medications: None       Past Medical History:   Diagnosis Date    Acquired ankle/foot deformity     last assessed: 8/29/2017    Anxiety     Chronic pain disorder     right foot plantar    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    Dysfunctional uterine bleeding     Last assessed: 1/9/2018    Headache     Lupus     Mass of knee     last assessed: 5/7/2014    Mild acid reflux     Paronychia of finger     last assessed: 11/20/2013    Paronychia of toe     last asssessed: 9/28/2016   right foot    Patellofemoral dysfunction     last assessed: 3/21/2014    PCOS (polycystic ovarian syndrome) 12/15/2007    Pes planus, congenital     last assessed: 2017    Plantar fasciitis of right foot     Plantar fibromatosis     last assessed: 2017    PONV (postoperative nausea and vomiting)     Trichomoniasis     last assessed: 2015    Vaginal candidiasis     last assessed: 2016    Varicose veins of lower extremity     last assessed: 3/5/2014    Vertigo     last assessed: 3/5/2014    Viral gastroenteritis     last assessed: 2015       Past Surgical History:   Procedure Laterality Date    BACK SURGERY      discectomy lumbar     SECTION      x2 bikini    PILONIDAL CYST EXCISION      OR HYSTEROSCOPY,W/ENDO BX N/A 10/18/2017    Procedure: HYSTEROSCOPY AND FRACTIONAL DILATATION AND CURRETTAGE;  Surgeon: Ruddy Ashby MD;  [facility] MAIN OR;  Service: Gynecology    TUBAL LIGATION  2016    WISDOM TOOTH EXTRACTION         Family History   Problem Relation Age of Onset    Cancer Mother         skin , gallbladder    Liver disease Mother     Hypertension Father     Heart disease Sister     Cancer Brother         skin    No Known Problems Daughter     No Known Problems Son     No Known Problems Son     Colon cancer Maternal Uncle     Arthritis Family     Lung cancer Family     Uterine cancer Family     No Known Problems Maternal Aunt     No Known Problems Paternal Aunt     No Known Problems Paternal Uncle     No Known Problems Maternal Grandmother     No Known Problems Maternal Grandfather     No Known Problems Paternal Grandmother     No Known Problems Paternal Grandfather     ADD / ADHD Neg Hx     Anesthesia problems Neg Hx     Clotting disorder Neg Hx     Collagen disease Neg Hx     Diabetes Neg Hx     Dislocations Neg Hx     Learning disabilities Neg Hx     Neurological problems Neg Hx     Osteoporosis Neg Hx     Rheumatologic disease Neg Hx     Scoliosis Neg Hx     Vascular Disease Neg Hx      I have reviewed and agree with the history as documented      Social History     Tobacco Use    Smoking status: Former Smoker     Packs/day: 0 50     Years: 20 00     Pack years: 10 00     Last attempt to quit: 2010     Years since quittin 2    Smokeless tobacco: Never Used   Substance Use Topics    Alcohol use: Yes     Comment: occ    Drug use: No        Review of Systems   Constitutional: Negative for chills and fever  HENT: Negative for sneezing and sore throat  Respiratory: Negative for cough and shortness of breath  Cardiovascular: Negative for chest pain, palpitations and leg swelling  Gastrointestinal: Positive for abdominal pain  Negative for constipation, diarrhea, nausea and vomiting  Genitourinary: Positive for pelvic pain  Negative for decreased urine volume, difficulty urinating, dysuria, flank pain, frequency, hematuria, urgency, vaginal bleeding, vaginal discharge and vaginal pain  Musculoskeletal: Negative for back pain, gait problem and joint swelling  Skin: Negative for color change, pallor, rash and wound  Neurological: Negative for dizziness, syncope, weakness, light-headedness, numbness and headaches  All other systems reviewed and are negative  Physical Exam  Physical Exam   Constitutional: She appears well-developed and well-nourished  No distress  HENT:   Head: Normocephalic and atraumatic  Nose: Nose normal    Eyes: EOM are normal    Neck: Normal range of motion  Cardiovascular: Normal rate, regular rhythm, normal heart sounds and intact distal pulses  Exam reveals no gallop and no friction rub  No murmur heard  Pulmonary/Chest: Effort normal and breath sounds normal  No stridor  No respiratory distress  She has no wheezes  She has no rales  Sp02 is 95% indicating adequate oxygenation on room air   Abdominal: Soft  Normal appearance and bowel sounds are normal  She exhibits no distension and no mass  There is tenderness in the suprapubic area and left lower quadrant   There is no rigidity, no rebound, no guarding, no CVA tenderness, no tenderness at McBurney's point and negative Navarrete's sign  Genitourinary: Pelvic exam was performed with patient prone  No labial fusion  There is no rash, tenderness, lesion or injury on the right labia  There is no rash, tenderness, lesion or injury on the left labia  Cervix exhibits no motion tenderness, no discharge and no friability  Right adnexum displays no mass, no tenderness and no fullness  Left adnexum displays no mass, no tenderness and no fullness  No erythema, tenderness or bleeding in the vagina  No foreign body in the vagina  No signs of injury around the vagina  Vaginal discharge found  Genitourinary Comments: GYN exam performed with Roxane Peñaloza RN in room  Scant thin white vaginal discharge noted  No CMT  Skin: Skin is warm  Capillary refill takes less than 2 seconds  No rash noted  She is not diaphoretic  No erythema  No pallor  Nursing note and vitals reviewed        Vital Signs  ED Triage Vitals [03/15/19 1022]   Temperature Pulse Respirations Blood Pressure SpO2   98 4 °F (36 9 °C) 79 16 112/59 95 %      Temp Source Heart Rate Source Patient Position - Orthostatic VS BP Location FiO2 (%)   Oral Monitor Sitting Right arm --      Pain Score       5           Vitals:    03/15/19 1022 03/15/19 1258   BP: 112/59 116/62   Pulse: 79 85   Patient Position - Orthostatic VS: Sitting Sitting       qSOFA     Row Name 03/15/19 1258 03/15/19 1022             Altered mental status GCS < 15  --  --       Respiratory Rate > / =22  0  0       Systolic BP < / =306  0  0       Q Sofa Score  0  0             Visual Acuity      ED Medications  Medications   sodium chloride 0 9 % bolus 1,000 mL (0 mL Intravenous Stopped 3/15/19 1259)   ketorolac (TORADOL) injection 15 mg (15 mg Intravenous Given 3/15/19 1108)   iohexol (OMNIPAQUE) 350 MG/ML injection (MULTI-DOSE) 100 mL (100 mL Intravenous Given 3/15/19 1141)       Diagnostic Studies  Results Reviewed     Procedure Component Value Units Date/Time Urine Microscopic [[id number]]  (Abnormal) Collected:  03/15/19 1101    Lab Status:  Final result Specimen:  Urine, Clean Catch Updated:  03/15/19 1126     RBC, UA 0-1 /hpf      WBC, UA 0-1 /hpf      Epithelial Cells Moderate /hpf      Bacteria, UA None Seen /hpf     Basic metabolic panel [[id number]]  (Abnormal) Collected:  03/15/19 1101    Lab Status:  Final result Specimen:  Blood from Arm, Left Updated:  03/15/19 1126     Sodium 138 mmol/L      Potassium 4 1 mmol/L      Chloride 99 mmol/L      CO2 28 mmol/L      ANION GAP 11 mmol/L      BUN 18 mg/dL      Creatinine 0 90 mg/dL      Glucose 131 mg/dL      Calcium 9 4 mg/dL      eGFR 79 ml/min/1 73sq m     Narrative:       National Kidney Disease Education Program recommendations are as follows:  GFR calculation is accurate only with a steady state creatinine  Chronic Kidney disease less than 60 ml/min/1 73 sq  meters  Kidney failure less than 15 ml/min/1 73 sq  meters      UA (URINE) with reflex to Microscopic [[id number]]  (Abnormal) Collected:  03/15/19 1101    Lab Status:  Final result Specimen:  Urine, Clean Catch Updated:  03/15/19 1114     Color, UA Yellow     Clarity, UA Clear     Specific Gravity, UA >=1 030     pH, UA 5 5     Leukocytes, UA Negative     Nitrite, UA Negative     Protein, UA Negative mg/dl      Glucose, UA Negative mg/dl      Ketones, UA Negative mg/dl      Urobilinogen, UA 0 2 E U /dl      Bilirubin, UA Negative     Blood, UA Trace-Intact    CBC and differential [[id number]]  (Abnormal) Collected:  03/15/19 1101    Lab Status:  Final result Specimen:  Blood from Arm, Left Updated:  03/15/19 1110     WBC 9 69 Thousand/uL      RBC 4 46 Million/uL      Hemoglobin 12 9 g/dL      Hematocrit 39 5 %      MCV 89 fL      MCH 28 9 pg      MCHC 32 7 g/dL      RDW 12 5 %      MPV 9 5 fL      Platelets 926 Thousands/uL      nRBC 0 /100 WBCs      Neutrophils Relative 84 %      Immat GRANS % 1 %      Lymphocytes Relative 12 %      Monocytes Relative 2 % Eosinophils Relative 0 %      Basophils Relative 1 %      Neutrophils Absolute 8 15 Thousands/µL      Immature Grans Absolute 0 11 Thousand/uL      Lymphocytes Absolute 1 20 Thousands/µL      Monocytes Absolute 0 16 Thousand/µL      Eosinophils Absolute 0 02 Thousand/µL      Basophils Absolute 0 05 Thousands/µL     Chlamydia/GC amplified DNA by PCR [[id number]] Collected:  03/15/19 1101    Lab Status: In process Specimen:  Urine, Other Updated:  03/15/19 1108    POCT pregnancy, urine [[id number]]  (Normal) Resulted:  03/15/19 1108    Lab Status:  Final result Updated:  03/15/19 1108     EXT PREG TEST UR (Ref: Negative) negative    Genital Comprehensive Culture [[id number]] Collected:  03/15/19 1101    Lab Status: In process Specimen:  Genital from Vaginal Updated:  03/15/19 1108                 CT abdomen pelvis with contrast   Final Result by Fabricio Jaeger MD (03/15 1210)      No acute CT abnormality in the abdomen or pelvis to account for the patient's symptoms  Normal appendix  No evidence of diverticulosis or acute diverticulitis  Workstation performed: [id number]                    Procedures  Procedures       Phone Contacts  ED Phone Contact    ED Course  ED Course as of Mar 15 2112   Fri Mar 15, 2019   1044 Discussed patient with Blued who saw patient on Monday for pelvic ultrasound  Patient has small L sided follicle, no cyst noted on report  Will check CT scan for other causes of abd pain  MDM  Number of Diagnoses or Management Options  Abdominal pain:   Diagnosis management comments: Patient well appearing, afebrile  Labs otherwise unremarkable for infection, CT scan without acute findings, UA clean  Will contact patient if any +results genital comprehensive culture or G/C  Gave patient proper education regarding diagnosis  Answered all questions  Return to ED for any worsening of symptoms otherwise follow up with primary care physician for re-evaluation  Discussed plan with patient who verbalized understanding and agreed to plan  Amount and/or Complexity of Data Reviewed  Clinical lab tests: ordered and reviewed  Tests in the radiology section of CPT®: ordered and reviewed  Tests in the medicine section of CPT®: ordered and reviewed  Discussion of test results with the performing providers: yes  Review and summarize past medical records: yes  Discuss the patient with other providers: yes  Independent visualization of images, tracings, or specimens: yes        Disposition  Final diagnoses:   Abdominal pain     Time reflects when diagnosis was documented in both MDM as applicable and the Disposition within this note     Time User Action Codes Description Comment    3/15/2019 12:42 PM Eddi Thao Add [R10 9] Abdominal pain       ED Disposition     ED Disposition Condition Date/Time Comment    Discharge Good Fri Mar 15, 2019 12:42 PM Adrián Lemus discharge to home/self care              Follow-up Information     Follow up With Specialties Details Why Contact Info Additional [address], [address] [facility], Nurse Practitioner Schedule an appointment as soon as possible for a visit in 3 days If symptoms worsen [id number] [facility] [phone] [facility] Emergency Department Emergency Medicine Go to  As needed [address] [facility] ED, [place], [place], [address]          Discharge Medication List as of 3/15/2019 12:43 PM      CONTINUE these medications which have NOT CHANGED    Details   Biotin 10 MG TABS Take by mouth, Historical Med      cholecalciferol (VITAMIN D3) 1,000 units tablet Take 1,000 Units by mouth daily, Historical Med      cyclobenzaprine (FLEXERIL) 5 mg tablet Take 2 tablets (10 mg total) by mouth 3 (three) times a day as needed for muscle spasms, Starting Mon 0/8/8772, Normal      folic acid (FOLVITE) 1 mg tablet Starting Sun 1/20/2019, Historical Med      hydroxychloroquine (PLAQUENIL) 200 mg tablet Take 200 mg by mouth 2 (two) times a day, Starting Wed 5/2/2018, Historical Med      ibuprofen (MOTRIN) 600 mg tablet Take 1 tablet (600 mg total) by mouth every 8 (eight) hours as needed for mild pain, Starting Mon 3/4/2019, Normal      methotrexate 2 5 mg tablet Take 10 mg by mouth once a week, Historical Med      SUMAtriptan (IMITREX) 100 mg tablet Take 1 tablet (100 mg total) by mouth once as needed for migraine for up to 1 dose Within 30 min onset of headache, Starting Tue 2/26/2019, Normal      albuterol (PROAIR HFA) 90 mcg/act inhaler Inhale 2 puffs every 4 (four) hours as needed for wheezing, Starting Tue 10/2/2018, Normal      benzonatate (TESSALON) 200 MG capsule Take 1 capsule (200 mg total) by mouth 3 (three) times a day as needed for cough, Starting Tue 10/9/2018, Normal      fluticasone (FLONASE) 50 mcg/act nasal spray instill 2 sprays into each nostril once daily, Normal      fluticasone (FLOVENT HFA) 110 MCG/ACT inhaler Inhale 1 puff 2 (two) times a day Rinse mouth after use , Starting Tue 10/9/2018, Normal      gabapentin (NEURONTIN) 600 MG tablet Take 1 tablet (600 mg total) by mouth 2 (two) times a day for 30 days, Starting Fri 1/11/2019, Until Mon 3/4/2019, Normal      naproxen (NAPROSYN) 500 mg tablet take 1 tablet by mouth twice a day with MEALS, Normal      omeprazole (PriLOSEC) 20 mg delayed release capsule Take 1 capsule (20 mg total) by mouth daily for 30 days, Starting Wed 1/2/2019, Until Mon 3/4/2019, Normal           No discharge procedures on file      ED Provider  Electronically Signed by           Gautam Santana PA-C  03/15/19 9343

## 2019-03-16 RX ORDER — AMOXICILLIN 500 MG/1
500 CAPSULE ORAL EVERY 12 HOURS SCHEDULED
Qty: 14 CAPSULE | Refills: 0 | Status: SHIPPED | OUTPATIENT
Start: 2019-03-16 | End: 2019-03-23

## 2019-03-17 LAB
BACTERIA GENITAL AEROBE CULT: ABNORMAL
BACTERIA GENITAL AEROBE CULT: ABNORMAL

## 2019-03-18 ENCOUNTER — VBI (OUTPATIENT)
Dept: ADMINISTRATIVE | Facility: OTHER | Age: 44
End: 2019-03-18

## 2019-03-18 LAB
C TRACH DNA SPEC QL NAA+PROBE: NEGATIVE
N GONORRHOEA DNA SPEC QL NAA+PROBE: NEGATIVE

## 2019-03-18 NOTE — TELEPHONE ENCOUNTER
Ricardo Carmona    ED Visit Information     Ed visit date: 03/15/2019  Diagnosis Description: ABDOMINAL PAIN  In Network? Yes [facility]  Discharge status: Home  Discharged with meds ? Yes  Number of ED visits to date: 1  ED Severity:N/A     Outreach Information    Outreach successful: Yes 1  Date letter mailed:NO  Date Finalized:03/18/2019    Care Coordination    DECLINED  Transportation issues ? NO    Value [employer]

## 2019-04-29 DIAGNOSIS — G43.009 MIGRAINE WITHOUT AURA AND WITHOUT STATUS MIGRAINOSUS, NOT INTRACTABLE: ICD-10-CM

## 2019-04-29 RX ORDER — SUMATRIPTAN 100 MG/1
100 TABLET, FILM COATED ORAL ONCE AS NEEDED
Qty: 9 TABLET | Refills: 0 | Status: SHIPPED | OUTPATIENT
Start: 2019-04-29 | End: 2019-07-19 | Stop reason: SDUPTHER

## 2019-06-24 ENCOUNTER — OFFICE VISIT (OUTPATIENT)
Dept: URGENT CARE | Facility: CLINIC | Age: 44
End: 2019-06-24
Payer: COMMERCIAL

## 2019-06-24 VITALS
OXYGEN SATURATION: 99 % | BODY MASS INDEX: 30.67 KG/M2 | RESPIRATION RATE: 16 BRPM | SYSTOLIC BLOOD PRESSURE: 130 MMHG | TEMPERATURE: 99.1 F | HEART RATE: 90 BPM | DIASTOLIC BLOOD PRESSURE: 58 MMHG | WEIGHT: 190 LBS

## 2019-06-24 DIAGNOSIS — J06.9 ACUTE URI: Primary | ICD-10-CM

## 2019-06-24 PROCEDURE — 99213 OFFICE O/P EST LOW 20 MIN: CPT | Performed by: NURSE PRACTITIONER

## 2019-06-24 RX ORDER — BROMPHENIRAMINE MALEATE, PSEUDOEPHEDRINE HYDROCHLORIDE, AND DEXTROMETHORPHAN HYDROBROMIDE 2; 30; 10 MG/5ML; MG/5ML; MG/5ML
10 SYRUP ORAL 4 TIMES DAILY PRN
Qty: 120 ML | Refills: 0 | Status: SHIPPED | OUTPATIENT
Start: 2019-06-24 | End: 2019-06-27

## 2019-06-24 RX ORDER — METRONIDAZOLE 500 MG/1
500 TABLET ORAL 2 TIMES DAILY
Refills: 0 | COMMUNITY
Start: 2019-03-19 | End: 2019-07-05

## 2019-06-24 RX ORDER — ECHINACEA PURPUREA EXTRACT 125 MG
1 TABLET ORAL
COMMUNITY

## 2019-06-24 RX ORDER — FLUTICASONE PROPIONATE 50 MCG
1 SPRAY, SUSPENSION (ML) NASAL 2 TIMES DAILY
Qty: 1 BOTTLE | Refills: 0 | Status: SHIPPED | OUTPATIENT
Start: 2019-06-24 | End: 2019-09-09 | Stop reason: SDUPTHER

## 2019-07-05 ENCOUNTER — OFFICE VISIT (OUTPATIENT)
Dept: URGENT CARE | Facility: CLINIC | Age: 44
End: 2019-07-05
Payer: COMMERCIAL

## 2019-07-05 VITALS
WEIGHT: 190 LBS | SYSTOLIC BLOOD PRESSURE: 102 MMHG | RESPIRATION RATE: 18 BRPM | BODY MASS INDEX: 30.53 KG/M2 | DIASTOLIC BLOOD PRESSURE: 59 MMHG | HEART RATE: 75 BPM | HEIGHT: 66 IN | TEMPERATURE: 97.7 F | OXYGEN SATURATION: 97 %

## 2019-07-05 DIAGNOSIS — J01.40 ACUTE NON-RECURRENT PANSINUSITIS: Primary | ICD-10-CM

## 2019-07-05 PROCEDURE — 99213 OFFICE O/P EST LOW 20 MIN: CPT | Performed by: FAMILY MEDICINE

## 2019-07-05 RX ORDER — AMOXICILLIN AND CLAVULANATE POTASSIUM 875; 125 MG/1; MG/1
1 TABLET, FILM COATED ORAL EVERY 12 HOURS SCHEDULED
Qty: 20 TABLET | Refills: 0 | Status: SHIPPED | OUTPATIENT
Start: 2019-07-05 | End: 2019-07-15

## 2019-07-05 NOTE — PATIENT INSTRUCTIONS
Plain Mucinex or plain guaifenesin for congestion and thick mucus  Sinus massage, as demonstrated, every 2-3 hours  Follow up with PCP in 3-5 days  Proceed to  ER if symptoms worsen  Sinusitis   AMBULATORY CARE:   Sinusitis  is inflammation or infection of your sinuses  It is most often caused by a virus  Acute sinusitis may last up to 12 weeks  Chronic sinusitis lasts longer than 12 weeks  Recurrent sinusitis means you have 4 or more times in 1 year  Common symptoms include the following:   · Fever    · Pain, pressure, redness, or swelling around the forehead, cheeks, or eyes    · Thick yellow or green discharge from your nose    · Tenderness when you touch your face over your sinuses    · Dry cough that happens mostly at night or when you lie down    · Headache and face pain that is worse when you lean forward    · Tooth pain, or pain when you chew  Seek care immediately if:   · Your eye and eyelid are red, swollen, and painful  · You cannot open your eye  · You have vision changes, such as double vision  · Your eyeball bulges out or you cannot move your eye  · You are more sleepy than normal, or you notice changes in your ability to think, move, or talk  · You have a stiff neck, a fever, or a bad headache  · You have swelling of your forehead or scalp  Contact your healthcare provider if:   · Your symptoms do not improve after 3 days  · Your symptoms do not go away after 10 days  · You have nausea and are vomiting  · Your nose is bleeding  · You have questions or concerns about your condition or care  Treatment for sinusitis:  Your symptoms may go away on their own  Your healthcare provider may recommend watchful waiting for up to 10 days before starting antibiotics  You may  need any of the following:  · Acetaminophen  decreases pain and fever  It is available without a doctor's order  Ask how much to take and how often to take it  Follow directions   Read the labels of all other medicines you are using to see if they also contain acetaminophen, or ask your doctor or pharmacist  Acetaminophen can cause liver damage if not taken correctly  Do not use more than 4 grams (4,000 milligrams) total of acetaminophen in one day  · NSAIDs , such as ibuprofen, help decrease swelling, pain, and fever  This medicine is available with or without a doctor's order  NSAIDs can cause stomach bleeding or kidney problems in certain people  If you take blood thinner medicine, always ask your healthcare provider if NSAIDs are safe for you  Always read the medicine label and follow directions  · Nasal steroid sprays  may help decrease inflammation in your nose and sinuses  · Decongestants  help reduce swelling and drain mucus in the nose and sinuses  They may help you breathe easier  · Antihistamines  help dry mucus in the nose and relieve sneezing  · Antibiotics  help treat or prevent a bacterial infection  · Take your medicine as directed  Contact your healthcare provider if you think your medicine is not helping or if you have side effects  Tell him or her if you are allergic to any medicine  Keep a list of the medicines, vitamins, and herbs you take  Include the amounts, and when and why you take them  Bring the list or the pill bottles to follow-up visits  Carry your medicine list with you in case of an emergency  Self-care:   · Rinse your sinuses  Use a sinus rinse device to rinse your nasal passages with a saline (salt water) solution or distilled water  Do not use tap water  This will help thin the mucus in your nose and rinse away pollen and dirt  It will also help reduce swelling so you can breathe normally  Ask your healthcare provider how often to do this  · Breathe in steam   Heat a bowl of water until you see steam  Lean over the bowl and make a tent over your head with a large towel  Breathe deeply for about 20 minutes   Be careful not to get too close to the steam or burn yourself  Do this 3 times a day  You can also breathe deeply when you take a hot shower  · Sleep with your head elevated  Place an extra pillow under your head before you go to sleep to help your sinuses drain  · Drink liquids as directed  Ask your healthcare provider how much liquid to drink each day and which liquids are best for you  Liquids will thin the mucus in your nose and help it drain  Avoid drinks that contain alcohol or caffeine  · Do not smoke, and avoid secondhand smoke  Nicotine and other chemicals in cigarettes and cigars can make your symptoms worse  Ask your healthcare provider for information if you currently smoke and need help to quit  E-cigarettes or smokeless tobacco still contain nicotine  Talk to your healthcare provider before you use these products  Prevent the spread of germs that cause sinusitis:  Wash your hands often with soap and water  Wash your hands after you use the bathroom, change a child's diaper, or sneeze  Wash your hands before you prepare or eat food  Follow up with your healthcare provider as directed: You may be referred to an ear, nose, and throat specialist  Write down your questions so you remember to ask them during your visits  © 2017 [employer]  Information is for End User's use only and may not be sold, redistributed or otherwise used for commercial purposes  All illustrations and images included in CareNotes® are the copyrighted property of A [employer]  or [employer]  The above information is an  only  It is not intended as medical advice for individual conditions or treatments  Talk to your doctor, nurse or pharmacist before following any medical regimen to see if it is safe and effective for you

## 2019-07-19 DIAGNOSIS — G43.009 MIGRAINE WITHOUT AURA AND WITHOUT STATUS MIGRAINOSUS, NOT INTRACTABLE: ICD-10-CM

## 2019-07-19 RX ORDER — SUMATRIPTAN 100 MG/1
100 TABLET, FILM COATED ORAL ONCE AS NEEDED
Qty: 9 TABLET | Refills: 0 | Status: SHIPPED | OUTPATIENT
Start: 2019-07-19 | End: 2019-09-09 | Stop reason: SDUPTHER

## 2019-09-09 DIAGNOSIS — G43.009 MIGRAINE WITHOUT AURA AND WITHOUT STATUS MIGRAINOSUS, NOT INTRACTABLE: ICD-10-CM

## 2019-09-09 DIAGNOSIS — J06.9 ACUTE URI: ICD-10-CM

## 2019-09-09 DIAGNOSIS — K21.9 GERD WITHOUT ESOPHAGITIS: ICD-10-CM

## 2019-09-09 RX ORDER — FLUTICASONE PROPIONATE 50 MCG
1 SPRAY, SUSPENSION (ML) NASAL 2 TIMES DAILY
Qty: 1 BOTTLE | Refills: 0 | Status: SHIPPED | OUTPATIENT
Start: 2019-09-09 | End: 2019-12-28 | Stop reason: SDUPTHER

## 2019-09-09 RX ORDER — SUMATRIPTAN 100 MG/1
100 TABLET, FILM COATED ORAL ONCE AS NEEDED
Qty: 9 TABLET | Refills: 0 | Status: SHIPPED | OUTPATIENT
Start: 2019-09-09 | End: 2019-10-28 | Stop reason: SDUPTHER

## 2019-09-09 RX ORDER — OMEPRAZOLE 20 MG/1
20 CAPSULE, DELAYED RELEASE ORAL DAILY
Qty: 30 CAPSULE | Refills: 0 | Status: SHIPPED | OUTPATIENT
Start: 2019-09-09 | End: 2020-05-05

## 2019-10-28 DIAGNOSIS — G43.009 MIGRAINE WITHOUT AURA AND WITHOUT STATUS MIGRAINOSUS, NOT INTRACTABLE: ICD-10-CM

## 2019-10-28 RX ORDER — SUMATRIPTAN 100 MG/1
100 TABLET, FILM COATED ORAL ONCE AS NEEDED
Qty: 9 TABLET | Refills: 0 | Status: SHIPPED | OUTPATIENT
Start: 2019-10-28 | End: 2020-01-06

## 2019-12-27 DIAGNOSIS — J06.9 ACUTE URI: ICD-10-CM

## 2019-12-28 RX ORDER — FLUTICASONE PROPIONATE 50 MCG
SPRAY, SUSPENSION (ML) NASAL
Qty: 16 G | Refills: 0 | Status: SHIPPED | OUTPATIENT
Start: 2019-12-28 | End: 2020-09-10 | Stop reason: SDUPTHER

## 2020-01-05 DIAGNOSIS — G43.009 MIGRAINE WITHOUT AURA AND WITHOUT STATUS MIGRAINOSUS, NOT INTRACTABLE: ICD-10-CM

## 2020-01-06 RX ORDER — SUMATRIPTAN 100 MG/1
TABLET, FILM COATED ORAL
Qty: 9 TABLET | Refills: 0 | Status: SHIPPED | OUTPATIENT
Start: 2020-01-06 | End: 2020-02-18

## 2020-02-18 DIAGNOSIS — G43.009 MIGRAINE WITHOUT AURA AND WITHOUT STATUS MIGRAINOSUS, NOT INTRACTABLE: ICD-10-CM

## 2020-02-18 RX ORDER — SUMATRIPTAN 100 MG/1
TABLET, FILM COATED ORAL
Qty: 9 TABLET | Refills: 0 | Status: SHIPPED | OUTPATIENT
Start: 2020-02-18 | End: 2020-06-03 | Stop reason: SDUPTHER

## 2020-02-20 ENCOUNTER — OFFICE VISIT (OUTPATIENT)
Dept: FAMILY MEDICINE CLINIC | Facility: CLINIC | Age: 45
End: 2020-02-20
Payer: COMMERCIAL

## 2020-02-20 VITALS
RESPIRATION RATE: 14 BRPM | SYSTOLIC BLOOD PRESSURE: 112 MMHG | BODY MASS INDEX: 31.82 KG/M2 | TEMPERATURE: 98 F | HEART RATE: 78 BPM | HEIGHT: 66 IN | WEIGHT: 198 LBS | DIASTOLIC BLOOD PRESSURE: 62 MMHG | OXYGEN SATURATION: 98 %

## 2020-02-20 DIAGNOSIS — J01.10 ACUTE NON-RECURRENT FRONTAL SINUSITIS: Primary | ICD-10-CM

## 2020-02-20 PROCEDURE — 3008F BODY MASS INDEX DOCD: CPT | Performed by: FAMILY MEDICINE

## 2020-02-20 PROCEDURE — 99213 OFFICE O/P EST LOW 20 MIN: CPT | Performed by: FAMILY MEDICINE

## 2020-02-20 PROCEDURE — 1036F TOBACCO NON-USER: CPT | Performed by: FAMILY MEDICINE

## 2020-02-20 RX ORDER — AMOXICILLIN AND CLAVULANATE POTASSIUM 875; 125 MG/1; MG/1
1 TABLET, FILM COATED ORAL EVERY 12 HOURS SCHEDULED
Qty: 14 TABLET | Refills: 0 | Status: SHIPPED | OUTPATIENT
Start: 2020-02-20 | End: 2020-02-27

## 2020-02-20 RX ORDER — CHLORAL HYDRATE 500 MG
1000 CAPSULE ORAL DAILY
COMMUNITY

## 2020-02-20 NOTE — PROGRESS NOTES
Assessment/Plan:    1  Acute non-recurrent frontal sinusitis  -     amoxicillin-clavulanate (Augmentin) 875-125 mg per tablet; Take 1 tablet by mouth every 12 (twelve) hours for 7 days        Advised supportive care with fluids, rest, and tylenol  Can use humidifier  Patient given antibiotics  No allergies  Dicussed side effects including rash, hives, and SOB  Precautions reviewed  If worsening symptoms, patient should be evaluated  Return if symptoms worsen or fail to improve  Subjective:      Patient ID: Karen Branch is a 40 y o  female  Chief Complaint   Patient presents with    Sinus Problem     pt c/o sinus pain/pressure, runny nose       HPI  41 y/o female presents for sick visit  Patient states she has headaches, jaw pain, and congestion, and rhinorrhea since Tuesday  Has tried flonase, nyquil, advil, and muciniex  Sick contacts including two children with sinus infection and currently being treated  No fevers  Associated symptoms with ear pain, fatigue, and chills  No recent travel  The following portions of the patient's history were reviewed and updated as appropriate: allergies, current medications, past family history, past medical history, past social history, past surgical history and problem list     Review of Systems   Constitutional: Positive for chills and fatigue  Negative for appetite change and fever  HENT: Positive for congestion, ear pain, sinus pressure and sinus pain  Negative for sore throat  Eyes: Negative for discharge and visual disturbance  Respiratory: Negative for cough and shortness of breath  Cardiovascular: Negative for chest pain and leg swelling  Gastrointestinal: Negative for abdominal pain, diarrhea, nausea and vomiting  Musculoskeletal: Negative for arthralgias  Skin: Negative for color change and rash  Neurological: Positive for headaches  Negative for dizziness and light-headedness     Psychiatric/Behavioral: Negative for agitation and behavioral problems  Current Outpatient Medications   Medication Sig Dispense Refill    albuterol (PROAIR HFA) 90 mcg/act inhaler Inhale 2 puffs every 4 (four) hours as needed for wheezing 8 5 g 0    Biotin 10 MG TABS Take by mouth      cholecalciferol (VITAMIN D3) 1,000 units tablet Take 1,000 Units by mouth daily      fluticasone (FLONASE) 50 mcg/act nasal spray instill 1 spray into each nostril twice a day for 5 days 16 g 0    fluticasone (FLOVENT HFA) 110 MCG/ACT inhaler Inhale 1 puff 2 (two) times a day Rinse mouth after use   1 Inhaler 0    folic acid (FOLVITE) 1 mg tablet       hydroxychloroquine (PLAQUENIL) 200 mg tablet Take 200 mg by mouth 2 (two) times a day  0    ibuprofen (MOTRIN) 600 mg tablet Take 1 tablet (600 mg total) by mouth every 8 (eight) hours as needed for mild pain 30 tablet 0    methotrexate 2 5 mg tablet Take 10 mg by mouth once a week      Omega-3 Fatty Acids (FISH OIL) 1,000 mg Take 1,000 mg by mouth daily      SUMAtriptan (IMITREX) 100 mg tablet TAKE 1 TABLET BY MOUTH ONCE AS NEEDED FOR MIGRAINE FOR UP TO 1 DOSE WITHIN 30 MINUTES OF ONSET OF HEADACHE 9 tablet 0    amoxicillin-clavulanate (Augmentin) 875-125 mg per tablet Take 1 tablet by mouth every 12 (twelve) hours for 7 days 14 tablet 0    benzonatate (TESSALON) 200 MG capsule Take 1 capsule (200 mg total) by mouth 3 (three) times a day as needed for cough (Patient not taking: Reported on 3/4/2019) 30 capsule 3    cyclobenzaprine (FLEXERIL) 5 mg tablet Take 2 tablets (10 mg total) by mouth 3 (three) times a day as needed for muscle spasms (Patient not taking: Reported on 7/5/2019) 30 tablet 0    gabapentin (NEURONTIN) 600 MG tablet Take 1 tablet (600 mg total) by mouth 2 (two) times a day for 30 days 60 tablet 0    naproxen (NAPROSYN) 500 mg tablet take 1 tablet by mouth twice a day with MEALS (Patient not taking: Reported on 3/11/2019) 60 tablet 0    omeprazole (PriLOSEC) 20 mg delayed release capsule Take 1 capsule (20 mg total) by mouth daily for 61 days 30 capsule 0    sodium chloride (ALTAMIST SPRAY) 0 65 % nasal spray 1 spray into each nostril       No current facility-administered medications for this visit  Objective:    /62 (BP Location: Left arm, Patient Position: Sitting, Cuff Size: Standard)   Pulse 78   Temp 98 °F (36 7 °C) (Tympanic)   Resp 14   Ht 5' 6" (1 676 m)   Wt 89 8 kg (198 lb)   SpO2 98%   BMI 31 96 kg/m²        Physical Exam   Constitutional: She is oriented to person, place, and time  She appears well-developed and well-nourished  No distress  HENT:   Head: Normocephalic and atraumatic  Right Ear: External ear normal    Left Ear: External ear normal    Nose: Nose normal    Mouth/Throat: Oropharynx is clear and moist  No oropharyngeal exudate  Eyes: Conjunctivae and EOM are normal  Right eye exhibits no discharge  Left eye exhibits no discharge  Neck: Normal range of motion  Neck supple  Cardiovascular: Normal rate, regular rhythm, normal heart sounds and intact distal pulses  No murmur heard  Pulmonary/Chest: Effort normal and breath sounds normal  No respiratory distress  Abdominal: Soft  Bowel sounds are normal  She exhibits no distension  There is no tenderness  Musculoskeletal: Normal range of motion  She exhibits no edema  Lymphadenopathy:     She has no cervical adenopathy  Neurological: She is alert and oriented to person, place, and time  Skin: Skin is warm  Psychiatric: She has a normal mood and affect   Her behavior is normal               Rick Croft MD

## 2020-03-19 ENCOUNTER — TELEPHONE (OUTPATIENT)
Dept: FAMILY MEDICINE CLINIC | Facility: CLINIC | Age: 45
End: 2020-03-19

## 2020-03-19 NOTE — TELEPHONE ENCOUNTER
Pt con mccullough called to say she has headache, low grade fever once in while cough, aches and pains  sxs start yesterday started with chills   pls advised what to do

## 2020-03-19 NOTE — TELEPHONE ENCOUNTER
Per Iraida Navas pt is to practice self care, hydrate and take tylenol for any headache and fever  If the symptoms worsen call the office to determine inf an appt is needed  Pt is aware

## 2020-03-21 ENCOUNTER — OFFICE VISIT (OUTPATIENT)
Dept: FAMILY MEDICINE CLINIC | Facility: CLINIC | Age: 45
End: 2020-03-21
Payer: COMMERCIAL

## 2020-03-21 ENCOUNTER — TELEPHONE (OUTPATIENT)
Dept: FAMILY MEDICINE CLINIC | Facility: CLINIC | Age: 45
End: 2020-03-21

## 2020-03-21 DIAGNOSIS — R05.9 COUGH: ICD-10-CM

## 2020-03-21 DIAGNOSIS — R09.81 SINUS CONGESTION: Primary | ICD-10-CM

## 2020-03-21 PROCEDURE — 99214 OFFICE O/P EST MOD 30 MIN: CPT | Performed by: FAMILY MEDICINE

## 2020-03-21 RX ORDER — BENZONATATE 200 MG/1
200 CAPSULE ORAL 3 TIMES DAILY PRN
Qty: 20 CAPSULE | Refills: 0 | Status: SHIPPED | OUTPATIENT
Start: 2020-03-21

## 2020-03-21 RX ORDER — AMOXICILLIN 875 MG/1
875 TABLET, COATED ORAL 2 TIMES DAILY
Qty: 20 TABLET | Refills: 0 | Status: SHIPPED | OUTPATIENT
Start: 2020-03-21 | End: 2020-03-31

## 2020-03-21 NOTE — TELEPHONE ENCOUNTER
Chiquis:    Patient still has headache/cough/congestion  She wanted to know if you could send in something into pharmacy for her cough?

## 2020-03-21 NOTE — PROGRESS NOTES
Virtual Brief Visit    Reason for visit is cough, headache      Encounter provider Reji Lynn MD    Provider located at [address] [place] [id number]      Recent Visits  Date Type Provider Dept   03/19/20 Telephone Maribell Tellez, [facility] recent visits within past 7 days and meeting all other requirements     Today's Visits  Date Type Provider Dept   03/21/20 Office Visit Reji Lynn MD [address] [facility]   03/21/20 Telephone [facility]  [address] [facility]   Showing today's visits and meeting all other requirements     Future Appointments  Date Type Provider Dept   03/21/20 Office Visit Reji Lynn MD [address] [facility]   Showing future appointments within next 150 days and meeting all other requirements        Patient agrees to participate in a virtual check in via telephone or video visit instead of presenting to the office to address urgent/immediate medical needs  Patient is aware this is a billable service  After connecting through telephone, the patient was identified by name and date of birth  Brian Borrero was informed that this was a telemedicine visit and that the visit is being conducted through telephone which may not be secure and therefore might not be HIPAA-compliant  My office door was closed  No one else was in the room  She acknowledged consent and understanding of privacy and security of the virtual check-in visit  I informed the patient that I have reviewed her record in Epic and presented the opportunity for her to ask any questions regarding the visit today  The patient initiated communication and agreed to participate  Subjective  Brian Borrero is a 40 y o  female c/o  cough,sinus congestion/headache  Had fever (pt reports )/chils/bodyaches earlier in week but states no fever since Thursday(2 days ago)    No rash or gi sxs except for nausea 2 days go--felt dizzy-states fell forward ad hit lip on basin -? LOC--did not seek medical attention  States currently unemployed-at home caring for   Twins-8yrs old and her 2 yo child  +Travel  to [place] last Saturday (one week ago)-otherwise has been local  No known exposure to COVID-19  Past Medical History:   Diagnosis Date    Acquired ankle/foot deformity     last assessed: 2017    Anxiety     Chronic pain disorder     right foot plantar    Dermatomycosis 2008    Dysfunctional uterine bleeding     Last assessed: 2018    Headache     Lupus ([address] )     Mass of knee     last assessed: 2014    Mild acid reflux     Paronychia of finger     last assessed: 2013    Paronychia of toe     last asssessed: 2016   right foot    Patellofemoral dysfunction     last assessed: 3/21/2014    PCOS (polycystic ovarian syndrome) 12/15/2007    Pes planus, congenital     last assessed: 2017    Plantar fasciitis of right foot     Plantar fibromatosis     last assessed: 2017    PONV (postoperative nausea and vomiting)     Trichomoniasis     last assessed: 2015    Vaginal candidiasis     last assessed: 2016    Varicose veins of lower extremity     last assessed: 3/5/2014    Vertigo     last assessed: 3/5/2014    Viral gastroenteritis     last assessed: 2015       Past Surgical History:   Procedure Laterality Date    BACK SURGERY      discectomy lumbar     SECTION      x2 bikini    PILONIDAL CYST EXCISION      MI HYSTEROSCOPY,W/ENDO BX N/A 10/18/2017    Procedure: HYSTEROSCOPY AND FRACTIONAL DILATATION AND CURRETTAGE;  Surgeon: Althea Valladares MD;  Location: [place] [address] MAIN OR;  Service: Gynecology    TUBAL LIGATION  2016    WISDOM TOOTH EXTRACTION         Current Outpatient Medications   Medication Sig Dispense Refill    albuterol (PROAIR HFA) 90 mcg/act inhaler Inhale 2 puffs every 4 (four) hours as needed for wheezing 8 5 g 0    amoxicillin (AMOXIL) 875 mg tablet Take 1 tablet (875 mg total) by mouth 2 (two) times a day for 10 days 20 tablet 0    benzonatate (TESSALON) 200 MG capsule Take 1 capsule (200 mg total) by mouth 3 (three) times a day as needed for cough (Patient not taking: Reported on 3/4/2019) 30 capsule 3    benzonatate (TESSALON) 200 MG capsule Take 1 capsule (200 mg total) by mouth 3 (three) times a day as needed for cough 20 capsule 0    Biotin 10 MG TABS Take by mouth      cholecalciferol (VITAMIN D3) 1,000 units tablet Take 1,000 Units by mouth daily      cyclobenzaprine (FLEXERIL) 5 mg tablet Take 2 tablets (10 mg total) by mouth 3 (three) times a day as needed for muscle spasms (Patient not taking: Reported on 7/5/2019) 30 tablet 0    fluticasone (FLONASE) 50 mcg/act nasal spray instill 1 spray into each nostril twice a day for 5 days 16 g 0    fluticasone (FLOVENT HFA) 110 MCG/ACT inhaler Inhale 1 puff 2 (two) times a day Rinse mouth after use   1 Inhaler 0    folic acid (FOLVITE) 1 mg tablet       gabapentin (NEURONTIN) 600 MG tablet Take 1 tablet (600 mg total) by mouth 2 (two) times a day for 30 days 60 tablet 0    hydroxychloroquine (PLAQUENIL) 200 mg tablet Take 200 mg by mouth 2 (two) times a day  0    ibuprofen (MOTRIN) 600 mg tablet Take 1 tablet (600 mg total) by mouth every 8 (eight) hours as needed for mild pain 30 tablet 0    methotrexate 2 5 mg tablet Take 10 mg by mouth once a week      naproxen (NAPROSYN) 500 mg tablet take 1 tablet by mouth twice a day with MEALS (Patient not taking: Reported on 3/11/2019) 60 tablet 0    Omega-3 Fatty Acids (FISH OIL) 1,000 mg Take 1,000 mg by mouth daily      omeprazole (PriLOSEC) 20 mg delayed release capsule Take 1 capsule (20 mg total) by mouth daily for 61 days 30 capsule 0    sodium chloride (ALTAMIST SPRAY) 0 65 % nasal spray 1 spray into each nostril      SUMAtriptan (IMITREX) 100 mg tablet TAKE 1 TABLET BY MOUTH ONCE AS NEEDED FOR MIGRAINE FOR UP TO 1 DOSE WITHIN 30 MINUTES OF ONSET OF HEADACHE 9 tablet 0     No current facility-administered medications for this visit  No Known Allergies    Assessment    1  Sinus congestion  Comments:  rx Amoxil, otc nasal saline, vicks vapor  Orders:  -     benzonatate (TESSALON) 200 MG capsule; Take 1 capsule (200 mg total) by mouth 3 (three) times a day as needed for cough  -     amoxicillin (AMOXIL) 875 mg tablet; Take 1 tablet (875 mg total) by mouth 2 (two) times a day for 10 days    2  Cough  Comments:  rx Tessalon perles for prn use  Has albuterol inh at home  Orders:  -     benzonatate (TESSALON) 200 MG capsule; Take 1 capsule (200 mg total) by mouth 3 (three) times a day as needed for cough  -     amoxicillin (AMOXIL) 875 mg tablet; Take 1 tablet (875 mg total) by mouth 2 (two) times a day for 10 days    pt to follow-up if no improvement of sxs or for any change/concern in condition  I spent 15 minutes with the patient during this virtual check-in visit

## 2020-03-23 ENCOUNTER — TELEPHONE (OUTPATIENT)
Dept: FAMILY MEDICINE CLINIC | Facility: CLINIC | Age: 45
End: 2020-03-23

## 2020-03-23 DIAGNOSIS — J01.10 ACUTE NON-RECURRENT FRONTAL SINUSITIS: Primary | ICD-10-CM

## 2020-03-23 RX ORDER — BROMPHENIRAMINE MALEATE, PSEUDOEPHEDRINE HYDROCHLORIDE, AND DEXTROMETHORPHAN HYDROBROMIDE 2; 30; 10 MG/5ML; MG/5ML; MG/5ML
5 SYRUP ORAL 4 TIMES DAILY PRN
Qty: 240 ML | Refills: 0 | Status: SHIPPED | OUTPATIENT
Start: 2020-03-23

## 2020-03-23 NOTE — TELEPHONE ENCOUNTER
Alfreda Brizuela   Pt  Saw Dr David Ballard on Saturday for a virtual visit  She is requesting a cough med to use at night    She is using the tessalon pearls

## 2020-03-25 DIAGNOSIS — J01.10 ACUTE NON-RECURRENT FRONTAL SINUSITIS: Primary | ICD-10-CM

## 2020-03-25 RX ORDER — AMOXICILLIN 400 MG/5ML
800 POWDER, FOR SUSPENSION ORAL 2 TIMES DAILY
Qty: 200 ML | Refills: 0 | Status: SHIPPED | OUTPATIENT
Start: 2020-03-25 | End: 2020-04-04

## 2020-03-25 NOTE — TELEPHONE ENCOUNTER
Dr Reny Magallon,    Patient saw you on Saturday  She is now experiencing ear pain and would like to know if you could call in ear drops to [facility] in [place]

## 2020-05-04 DIAGNOSIS — K21.9 GERD WITHOUT ESOPHAGITIS: ICD-10-CM

## 2020-05-05 RX ORDER — OMEPRAZOLE 20 MG/1
CAPSULE, DELAYED RELEASE ORAL
Qty: 30 CAPSULE | Refills: 0 | Status: SHIPPED | OUTPATIENT
Start: 2020-05-05 | End: 2020-07-26 | Stop reason: SDUPTHER

## 2020-06-03 DIAGNOSIS — G43.009 MIGRAINE WITHOUT AURA AND WITHOUT STATUS MIGRAINOSUS, NOT INTRACTABLE: ICD-10-CM

## 2020-06-04 RX ORDER — SUMATRIPTAN 100 MG/1
100 TABLET, FILM COATED ORAL ONCE AS NEEDED
Qty: 9 TABLET | Refills: 0 | Status: SHIPPED | OUTPATIENT
Start: 2020-06-04 | End: 2020-07-07

## 2020-07-07 DIAGNOSIS — G43.009 MIGRAINE WITHOUT AURA AND WITHOUT STATUS MIGRAINOSUS, NOT INTRACTABLE: ICD-10-CM

## 2020-07-07 RX ORDER — SUMATRIPTAN 100 MG/1
TABLET, FILM COATED ORAL
Qty: 9 TABLET | Refills: 0 | Status: SHIPPED | OUTPATIENT
Start: 2020-07-07 | End: 2020-10-07 | Stop reason: SDUPTHER

## 2020-07-26 DIAGNOSIS — K21.9 GERD WITHOUT ESOPHAGITIS: ICD-10-CM

## 2020-07-26 RX ORDER — OMEPRAZOLE 20 MG/1
CAPSULE, DELAYED RELEASE ORAL
Qty: 30 CAPSULE | Refills: 0 | Status: SHIPPED | OUTPATIENT
Start: 2020-07-26 | End: 2020-08-31

## 2020-08-31 DIAGNOSIS — K21.9 GERD WITHOUT ESOPHAGITIS: ICD-10-CM

## 2020-08-31 RX ORDER — OMEPRAZOLE 20 MG/1
CAPSULE, DELAYED RELEASE ORAL
Qty: 30 CAPSULE | Refills: 0 | Status: SHIPPED | OUTPATIENT
Start: 2020-08-31 | End: 2020-11-11

## 2020-09-10 ENCOUNTER — TELEMEDICINE (OUTPATIENT)
Dept: FAMILY MEDICINE CLINIC | Facility: CLINIC | Age: 45
End: 2020-09-10
Payer: COMMERCIAL

## 2020-09-10 DIAGNOSIS — J32.0 LEFT MAXILLARY SINUSITIS: Primary | ICD-10-CM

## 2020-09-10 DIAGNOSIS — J06.9 ACUTE URI: ICD-10-CM

## 2020-09-10 DIAGNOSIS — Z12.31 BREAST CANCER SCREENING BY MAMMOGRAM: ICD-10-CM

## 2020-09-10 PROCEDURE — 1036F TOBACCO NON-USER: CPT | Performed by: NURSE PRACTITIONER

## 2020-09-10 PROCEDURE — 3725F SCREEN DEPRESSION PERFORMED: CPT | Performed by: NURSE PRACTITIONER

## 2020-09-10 PROCEDURE — 99213 OFFICE O/P EST LOW 20 MIN: CPT | Performed by: NURSE PRACTITIONER

## 2020-09-10 RX ORDER — FLUTICASONE PROPIONATE 50 MCG
2 SPRAY, SUSPENSION (ML) NASAL DAILY
Qty: 16 G | Refills: 4 | Status: SHIPPED | OUTPATIENT
Start: 2020-09-10 | End: 2021-02-08

## 2020-09-10 RX ORDER — AMOXICILLIN AND CLAVULANATE POTASSIUM 875; 125 MG/1; MG/1
1 TABLET, FILM COATED ORAL EVERY 12 HOURS SCHEDULED
Qty: 14 TABLET | Refills: 0 | Status: SHIPPED | OUTPATIENT
Start: 2020-09-10 | End: 2020-09-17

## 2020-09-10 NOTE — PROGRESS NOTES
Virtual Regular Visit      Assessment/Plan:    Problem List Items Addressed This Visit     None      Visit Diagnoses     Left maxillary sinusitis    -  Primary    Relevant Medications    amoxicillin-clavulanate (AUGMENTIN) 875-125 mg per tablet    Acute URI        Relevant Medications    amoxicillin-clavulanate (AUGMENTIN) 875-125 mg per tablet    fluticasone (FLONASE) 50 mcg/act nasal spray    Breast cancer screening by mammogram        Relevant Orders    Mammo screening bilateral w 3d & cad      The case discussed with patient using patient centered shared decision making  The patient was counseled regarding instructions for management,-- risk factor reductions,-- prognosis,-- impressions,-- risks and benefits of treatment options,-- importance of compliance with treatment  I have reviewed the instructions with the patient, answering all questions to her satisfaction  Reason for visit is   Chief Complaint   Patient presents with    Virtual Regular Visit        Encounter provider Tonie Chau, [address] [place] [address]    Provider located at [address] [place] [id number]      Recent Visits  No visits were found meeting these conditions  Showing recent visits within past 7 days and meeting all other requirements     Today's Visits  Date Type Provider Dept   09/10/20 Telemedicine Ines Leong 50 today's visits and meeting all other requirements     Future Appointments  No visits were found meeting these conditions  Showing future appointments within next 150 days and meeting all other requirements        The patient was identified by name and date of birth  Jose Rafael Mejia was informed that this is a telemedicine visit and that the visit is being conducted [place] [facility] and patient was informed that this is a secure, HIPAA-compliant platform  She agrees to proceed     My office door was closed   No one else was in the room  She acknowledged consent and understanding of privacy and security of the video platform  The patient has agreed to participate and understands they can discontinue the visit at any time  Patient is aware this is a billable service  Subjective        Sinusitis   This is a recurrent problem  The current episode started 1 to 4 weeks ago  The problem has been gradually worsening since onset  There has been no fever  The pain is moderate  Associated symptoms include congestion, ear pain, headaches, sinus pressure and swollen glands  (Colored nasal discharge) Past treatments include oral decongestants (asprin)  The treatment provided mild relief  Past Medical History:   Diagnosis Date    Acquired ankle/foot deformity     last assessed: 2017    Anxiety     Chronic pain disorder     right foot plantar    Dermatomycosis 2008    Dysfunctional uterine bleeding     Last assessed: 2018    Headache     Lupus ([address] )     Mass of knee     last assessed: 2014    Mild acid reflux     Paronychia of finger     last assessed: 2013    Paronychia of toe     last asssessed: 2016   right foot    Patellofemoral dysfunction     last assessed: 3/21/2014    PCOS (polycystic ovarian syndrome) 12/15/2007    Pes planus, congenital     last assessed: 2017    Plantar fasciitis of right foot     Plantar fibromatosis     last assessed: 2017    PONV (postoperative nausea and vomiting)     Trichomoniasis     last assessed: 2015    Vaginal candidiasis     last assessed: 2016    Varicose veins of lower extremity     last assessed: 3/5/2014    Vertigo     last assessed: 3/5/2014    Viral gastroenteritis     last assessed: 2015       Past Surgical History:   Procedure Laterality Date    BACK SURGERY      discectomy lumbar     SECTION      x2 bikini    PILONIDAL CYST EXCISION      FL HYSTEROSCOPY,W/ENDO BX N/A 10/18/2017    Procedure: HYSTEROSCOPY AND FRACTIONAL DILATATION AND CURRETTAGE;  Surgeon: Gonzalo Cisse MD;  [facility] MAIN OR;  Service: Gynecology    TUBAL LIGATION  2016    WISDOM TOOTH EXTRACTION         Current Outpatient Medications   Medication Sig Dispense Refill    albuterol (PROAIR HFA) 90 mcg/act inhaler Inhale 2 puffs every 4 (four) hours as needed for wheezing 8 5 g 0    amoxicillin-clavulanate (AUGMENTIN) 875-125 mg per tablet Take 1 tablet by mouth every 12 (twelve) hours for 7 days 14 tablet 0    benzonatate (TESSALON) 200 MG capsule Take 1 capsule (200 mg total) by mouth 3 (three) times a day as needed for cough (Patient not taking: Reported on 3/4/2019) 30 capsule 3    benzonatate (TESSALON) 200 MG capsule Take 1 capsule (200 mg total) by mouth 3 (three) times a day as needed for cough 20 capsule 0    Biotin 10 MG TABS Take by mouth      brompheniramine-pseudoephedrine-DM 30-2-10 MG/5ML syrup Take 5 mL by mouth 4 (four) times a day as needed for allergies 240 mL 0    cholecalciferol (VITAMIN D3) 1,000 units tablet Take 1,000 Units by mouth daily      cyclobenzaprine (FLEXERIL) 5 mg tablet Take 2 tablets (10 mg total) by mouth 3 (three) times a day as needed for muscle spasms (Patient not taking: Reported on 7/5/2019) 30 tablet 0    fluticasone (FLONASE) 50 mcg/act nasal spray 2 sprays into each nostril daily 16 g 4    fluticasone (FLOVENT HFA) 110 MCG/ACT inhaler Inhale 1 puff 2 (two) times a day Rinse mouth after use   1 Inhaler 0    folic acid (FOLVITE) 1 mg tablet       gabapentin (NEURONTIN) 600 MG tablet Take 1 tablet (600 mg total) by mouth 2 (two) times a day for 30 days 60 tablet 0    hydroxychloroquine (PLAQUENIL) 200 mg tablet Take 200 mg by mouth 2 (two) times a day  0    ibuprofen (MOTRIN) 600 mg tablet Take 1 tablet (600 mg total) by mouth every 8 (eight) hours as needed for mild pain 30 tablet 0    methotrexate 2 5 mg tablet Take 10 mg by mouth once a week      naproxen (NAPROSYN) 500 mg tablet take 1 tablet by mouth twice a day with MEALS (Patient not taking: Reported on 3/11/2019) 60 tablet 0    Omega-3 Fatty Acids (FISH OIL) 1,000 mg Take 1,000 mg by mouth daily      omeprazole (PriLOSEC) 20 mg delayed release capsule take 1 capsule by mouth once daily 30 capsule 0    sodium chloride (ALTAMIST SPRAY) 0 65 % nasal spray 1 spray into each nostril      SUMAtriptan (IMITREX) 100 mg tablet TAKE 1 TABLET BY MOUTH ONCE AS NEEDED FOR MIGRAINE TAKE WITHIN 30 MINUTES OF MIGRAINE AS DIRECTED 9 tablet 0     No current facility-administered medications for this visit  No Known Allergies    Review of Systems   Constitutional: Positive for fatigue  Negative for fever  HENT: Positive for congestion, ear pain and sinus pressure  Left side worse than right (sinus pain)     Respiratory: Negative  Cardiovascular: Negative  Endocrine: Negative  Neurological: Positive for headaches  Negative for dizziness and weakness  Video Exam    There were no vitals filed for this visit  Physical Exam  Constitutional:       General: She is not in acute distress  Appearance: Normal appearance  HENT:      Head:      Comments: Patient reports left max sinus pain when palpating during call  Pulmonary:      Effort: No respiratory distress  Neurological:      General: No focal deficit present  Mental Status: She is alert  Psychiatric:         Mood and Affect: Mood normal           I spent 8 minutes directly with the patient during this visit      VIRTUAL VISIT DISCLAIMER    Victorina Lynn acknowledges that she has consented to an online visit or consultation  She understands that the online visit is based solely on information provided by her, and that, in the absence of a face-to-face physical evaluation by the physician, the diagnosis she receives is both limited and provisional in terms of accuracy and completeness   This is not intended to replace a full medical face-to-face evaluation by the physician  Jasvir Ferrer understands and accepts these terms

## 2020-10-07 DIAGNOSIS — G43.009 MIGRAINE WITHOUT AURA AND WITHOUT STATUS MIGRAINOSUS, NOT INTRACTABLE: ICD-10-CM

## 2020-10-07 RX ORDER — SUMATRIPTAN 100 MG/1
TABLET, FILM COATED ORAL
Qty: 9 TABLET | Refills: 0 | Status: SHIPPED | OUTPATIENT
Start: 2020-10-07 | End: 2020-12-15

## 2020-11-11 DIAGNOSIS — K21.9 GERD WITHOUT ESOPHAGITIS: ICD-10-CM

## 2020-11-11 RX ORDER — OMEPRAZOLE 20 MG/1
CAPSULE, DELAYED RELEASE ORAL
Qty: 30 CAPSULE | Refills: 0 | Status: SHIPPED | OUTPATIENT
Start: 2020-11-11 | End: 2020-12-15

## 2020-12-14 DIAGNOSIS — G43.009 MIGRAINE WITHOUT AURA AND WITHOUT STATUS MIGRAINOSUS, NOT INTRACTABLE: ICD-10-CM

## 2020-12-14 DIAGNOSIS — K21.9 GERD WITHOUT ESOPHAGITIS: ICD-10-CM

## 2020-12-15 RX ORDER — SUMATRIPTAN 100 MG/1
TABLET, FILM COATED ORAL
Qty: 9 TABLET | Refills: 0 | Status: SHIPPED | OUTPATIENT
Start: 2020-12-15 | End: 2020-12-22

## 2020-12-15 RX ORDER — OMEPRAZOLE 20 MG/1
CAPSULE, DELAYED RELEASE ORAL
Qty: 30 CAPSULE | Refills: 0 | Status: SHIPPED | OUTPATIENT
Start: 2020-12-15 | End: 2021-05-26 | Stop reason: SDUPTHER

## 2020-12-22 DIAGNOSIS — G43.009 MIGRAINE WITHOUT AURA AND WITHOUT STATUS MIGRAINOSUS, NOT INTRACTABLE: ICD-10-CM

## 2020-12-22 RX ORDER — SUMATRIPTAN 100 MG/1
TABLET, FILM COATED ORAL
Qty: 9 TABLET | Refills: 0 | Status: SHIPPED | OUTPATIENT
Start: 2020-12-22 | End: 2021-01-25

## 2021-01-25 DIAGNOSIS — G43.009 MIGRAINE WITHOUT AURA AND WITHOUT STATUS MIGRAINOSUS, NOT INTRACTABLE: ICD-10-CM

## 2021-01-25 RX ORDER — SUMATRIPTAN 100 MG/1
TABLET, FILM COATED ORAL
Qty: 9 TABLET | Refills: 0 | Status: SHIPPED | OUTPATIENT
Start: 2021-01-25 | End: 2021-03-27

## 2021-02-08 DIAGNOSIS — J06.9 ACUTE URI: ICD-10-CM

## 2021-02-08 RX ORDER — FLUTICASONE PROPIONATE 50 MCG
SPRAY, SUSPENSION (ML) NASAL
Qty: 16 G | Refills: 4 | Status: SHIPPED | OUTPATIENT
Start: 2021-02-08 | End: 2021-07-15

## 2021-03-27 DIAGNOSIS — G43.009 MIGRAINE WITHOUT AURA AND WITHOUT STATUS MIGRAINOSUS, NOT INTRACTABLE: ICD-10-CM

## 2021-03-27 RX ORDER — SUMATRIPTAN 100 MG/1
TABLET, FILM COATED ORAL
Qty: 9 TABLET | Refills: 0 | Status: SHIPPED | OUTPATIENT
Start: 2021-03-27 | End: 2021-06-06

## 2021-04-16 ENCOUNTER — TELEMEDICINE (OUTPATIENT)
Dept: FAMILY MEDICINE CLINIC | Facility: CLINIC | Age: 46
End: 2021-04-16
Payer: COMMERCIAL

## 2021-04-16 DIAGNOSIS — J01.00 ACUTE NON-RECURRENT MAXILLARY SINUSITIS: Primary | ICD-10-CM

## 2021-04-16 PROCEDURE — 99213 OFFICE O/P EST LOW 20 MIN: CPT | Performed by: NURSE PRACTITIONER

## 2021-04-16 PROCEDURE — 3725F SCREEN DEPRESSION PERFORMED: CPT | Performed by: NURSE PRACTITIONER

## 2021-04-16 RX ORDER — AMOXICILLIN AND CLAVULANATE POTASSIUM 875; 125 MG/1; MG/1
1 TABLET, FILM COATED ORAL EVERY 12 HOURS SCHEDULED
Qty: 14 TABLET | Refills: 0 | Status: SHIPPED | OUTPATIENT
Start: 2021-04-16 | End: 2021-04-23

## 2021-04-16 NOTE — PROGRESS NOTES
Virtual Regular Visit      Assessment/Plan:    Problem List Items Addressed This Visit     None      Visit Diagnoses     Acute non-recurrent maxillary sinusitis    -  Primary    Relevant Medications    amoxicillin-clavulanate (AUGMENTIN) 875-125 mg per tablet               Reason for visit is   Chief Complaint   Patient presents with    Virtual Regular Visit        Encounter provider Jamil Castillo, [facility]    Provider located at [address] [place] [id number]      Recent Visits  No visits were found meeting these conditions  Showing recent visits within past 7 days and meeting all other requirements     Future Appointments  No visits were found meeting these conditions  Showing future appointments within next 150 days and meeting all other requirements        The patient was identified by name and date of birth  Reyna Medel was informed that this is a telemedicine visit and that the visit is being conducted through [facility] and patient was informed that this is a secure, HIPAA-compliant platform  She agrees to proceed     My office door was closed  No one else was in the room  She acknowledged consent and understanding of privacy and security of the video platform  The patient has agreed to participate and understands they can discontinue the visit at any time  Patient is aware this is a billable service  Subjective        Sinus Problem  This is a recurrent problem  The current episode started 1 to 4 weeks ago  The problem has been rapidly worsening since onset  There has been no fever  The pain is moderate  Associated symptoms include congestion, headaches and sinus pressure  Treatments tried: flonase, antihistamine  The treatment provided mild relief          Past Medical History:   Diagnosis Date    Acquired ankle/foot deformity     last assessed: 8/29/2017    Anxiety     Chronic pain disorder     right foot plantar    Dermatomycosis 2008    Dysfunctional uterine bleeding     Last assessed: 2018    Headache     Lupus ([address] )     Mass of knee     last assessed: 2014    Mild acid reflux     Paronychia of finger     last assessed: 2013    Paronychia of toe     last asssessed: 2016   right foot    Patellofemoral dysfunction     last assessed: 3/21/2014    PCOS (polycystic ovarian syndrome) 12/15/2007    Pes planus, congenital     last assessed: 2017    Plantar fasciitis of right foot     Plantar fibromatosis     last assessed: 2017    PONV (postoperative nausea and vomiting)     Trichomoniasis     last assessed: 2015    Vaginal candidiasis     last assessed: 2016    Varicose veins of lower extremity     last assessed: 3/5/2014    Vertigo     last assessed: 3/5/2014    Viral gastroenteritis     last assessed: 2015       Past Surgical History:   Procedure Laterality Date    BACK SURGERY      discectomy lumbar     SECTION      x2 bikini    PILONIDAL CYST EXCISION      IN HYSTEROSCOPY,W/ENDO BX N/A 10/18/2017    Procedure: HYSTEROSCOPY AND FRACTIONAL DILATATION AND CURRETTAGE;  Surgeon: Tab Young MD;  Location: [place] [address] MAIN OR;  Service: Gynecology    TUBAL LIGATION  2016    WISDOM TOOTH EXTRACTION         Current Outpatient Medications   Medication Sig Dispense Refill    albuterol (PROAIR HFA) 90 mcg/act inhaler Inhale 2 puffs every 4 (four) hours as needed for wheezing 8 5 g 0    amoxicillin-clavulanate (AUGMENTIN) 875-125 mg per tablet Take 1 tablet by mouth every 12 (twelve) hours for 7 days 14 tablet 0    benzonatate (TESSALON) 200 MG capsule Take 1 capsule (200 mg total) by mouth 3 (three) times a day as needed for cough (Patient not taking: Reported on 3/4/2019) 30 capsule 3    benzonatate (TESSALON) 200 MG capsule Take 1 capsule (200 mg total) by mouth 3 (three) times a day as needed for cough 20 capsule 0    Biotin 10 MG TABS Take by mouth  brompheniramine-pseudoephedrine-DM 30-2-10 MG/5ML syrup Take 5 mL by mouth 4 (four) times a day as needed for allergies 240 mL 0    cholecalciferol (VITAMIN D3) 1,000 units tablet Take 1,000 Units by mouth daily      cyclobenzaprine (FLEXERIL) 5 mg tablet Take 2 tablets (10 mg total) by mouth 3 (three) times a day as needed for muscle spasms (Patient not taking: Reported on 7/5/2019) 30 tablet 0    fluticasone (FLONASE) 50 mcg/act nasal spray instill 2 sprays into each nostril once daily 16 g 4    fluticasone (FLOVENT HFA) 110 MCG/ACT inhaler Inhale 1 puff 2 (two) times a day Rinse mouth after use  1 Inhaler 0    folic acid (FOLVITE) 1 mg tablet       gabapentin (NEURONTIN) 600 MG tablet Take 1 tablet (600 mg total) by mouth 2 (two) times a day for 30 days 60 tablet 0    hydroxychloroquine (PLAQUENIL) 200 mg tablet Take 200 mg by mouth 2 (two) times a day  0    ibuprofen (MOTRIN) 600 mg tablet Take 1 tablet (600 mg total) by mouth every 8 (eight) hours as needed for mild pain 30 tablet 0    methotrexate 2 5 mg tablet Take 10 mg by mouth once a week      naproxen (NAPROSYN) 500 mg tablet take 1 tablet by mouth twice a day with MEALS (Patient not taking: Reported on 3/11/2019) 60 tablet 0    Omega-3 Fatty Acids (FISH OIL) 1,000 mg Take 1,000 mg by mouth daily      omeprazole (PriLOSEC) 20 mg delayed release capsule take 1 capsule by mouth once daily 30 capsule 0    sodium chloride (ALTAMIST SPRAY) 0 65 % nasal spray 1 spray into each nostril      SUMAtriptan (IMITREX) 100 mg tablet TAKE 1 TABLET BY MOUTH ONCE AS NEEDED FOR MIGRAINE TAKE WITHIN 30 MINUTES OF MIGRAINE AS DIRECTED 9 tablet 0     No current facility-administered medications for this visit  No Known Allergies    Review of Systems   Constitutional: Positive for fatigue  Negative for fever  HENT: Positive for congestion, postnasal drip, rhinorrhea and sinus pressure  Respiratory: Negative  Cardiovascular: Negative  Gastrointestinal: Negative  Neurological: Positive for headaches  Negative for dizziness and weakness  Video Exam    There were no vitals filed for this visit  Physical Exam  Constitutional:       Appearance: Normal appearance  Neurological:      General: No focal deficit present  Mental Status: She is alert  Psychiatric:         Mood and Affect: Mood normal           I spent 8 minutes directly with the patient during this visit      VIRTUAL VISIT DISCLAIMER    Jesus Sheets acknowledges that she has consented to an online visit or consultation  She understands that the online visit is based solely on information provided by her, and that, in the absence of a face-to-face physical evaluation by the physician, the diagnosis she receives is both limited and provisional in terms of accuracy and completeness  This is not intended to replace a full medical face-to-face evaluation by the physician  Jesus Sheets understands and accepts these terms

## 2021-04-20 DIAGNOSIS — B37.3 YEAST VAGINITIS: Primary | ICD-10-CM

## 2021-04-20 RX ORDER — FLUCONAZOLE 150 MG/1
150 TABLET ORAL ONCE
Qty: 1 TABLET | Refills: 0 | Status: SHIPPED | OUTPATIENT
Start: 2021-04-20 | End: 2021-04-20

## 2021-04-20 NOTE — TELEPHONE ENCOUNTER
Jayce Walker    Patient says amoxicillin prescribed fro sinus infection is giving her yeast infection-vaginal itching  Please call in rx to [facility]

## 2021-04-30 ENCOUNTER — TELEMEDICINE (OUTPATIENT)
Dept: FAMILY MEDICINE CLINIC | Facility: CLINIC | Age: 46
End: 2021-04-30
Payer: COMMERCIAL

## 2021-04-30 DIAGNOSIS — J01.10 ACUTE NON-RECURRENT FRONTAL SINUSITIS: Primary | ICD-10-CM

## 2021-04-30 PROCEDURE — 99213 OFFICE O/P EST LOW 20 MIN: CPT | Performed by: FAMILY MEDICINE

## 2021-04-30 RX ORDER — FLUCONAZOLE 150 MG/1
150 TABLET ORAL ONCE
Qty: 1 TABLET | Refills: 0 | Status: SHIPPED | OUTPATIENT
Start: 2021-04-30 | End: 2021-04-30

## 2021-04-30 RX ORDER — DOXYCYCLINE HYCLATE 100 MG/1
100 CAPSULE ORAL EVERY 12 HOURS SCHEDULED
Qty: 14 CAPSULE | Refills: 0 | Status: SHIPPED | OUTPATIENT
Start: 2021-04-30 | End: 2021-05-07

## 2021-04-30 NOTE — PROGRESS NOTES
Virtual Regular Visit      Assessment/Plan:    Problem List Items Addressed This Visit     None      Visit Diagnoses     Acute non-recurrent frontal sinusitis    -  Primary    Relevant Medications    doxycycline hyclate (VIBRAMYCIN) 100 mg capsule    fluconazole (DIFLUCAN) 150 mg tablet          Given patient had no relief with Augmentin will switch out to doxycycline  Advised to avoid sun at this time  Side effects and precautions reviewed  Advised to use Flonase and nasal rinses  Diflucan sent as patient gets yeast infection with antibiotic  Reason for visit is   Chief Complaint   Patient presents with    Virtual Regular Visit        Encounter provider Rainer Cooper MD    Provider located at [address] [place] [id number]      Recent Visits  No visits were found meeting these conditions  Showing recent visits within past 7 days and meeting all other requirements     Today's Visits  Date Type Provider Dept   04/30/21 Telemedicine Rainer Cooper  [facility] today's visits and meeting all other requirements     Future Appointments  No visits were found meeting these conditions  Showing future appointments within next 150 days and meeting all other requirements        The patient was identified by name and date of birth  Melinda Chaudhari was informed that this is a telemedicine visit and that the visit is being conducted through [facility] and patient was informed that this is a secure, HIPAA-compliant platform  She agrees to proceed     My office door was closed  No one else was in the room  She acknowledged consent and understanding of privacy and security of the video platform  The patient has agreed to participate and understands they can discontinue the visit at any time  Patient is aware this is a billable service  Subjective  Melinda Chaudhari is a 39 y o  female patient complains of sinus infection   She was seen on 21 for a sinus infection  She was given augmentin  Did have some relief but has eye watery, pain into the gums and feel swollen  Eye pressure  Sinus pressure and pain on the left side  No fevers or chills  No cough  Has a post nasal drip  Sneezing  No fevers or chills  HPI     Past Medical History:   Diagnosis Date    Acquired ankle/foot deformity     last assessed: 2017    Anxiety     Chronic pain disorder     right foot plantar    Dermatomycosis 2008    Dysfunctional uterine bleeding     Last assessed: 2018    Headache     Lupus ([address] )     Mass of knee     last assessed: 2014    Mild acid reflux     Paronychia of finger     last assessed: 2013    Paronychia of toe     last asssessed: 2016   right foot    Patellofemoral dysfunction     last assessed: 3/21/2014    PCOS (polycystic ovarian syndrome) 12/15/2007    Pes planus, congenital     last assessed: 2017    Plantar fasciitis of right foot     Plantar fibromatosis     last assessed: 2017    PONV (postoperative nausea and vomiting)     Trichomoniasis     last assessed: 2015    Vaginal candidiasis     last assessed: 2016    Varicose veins of lower extremity     last assessed: 3/5/2014    Vertigo     last assessed: 3/5/2014    Viral gastroenteritis     last assessed: 2015       Past Surgical History:   Procedure Laterality Date    BACK SURGERY      discectomy lumbar     SECTION      x2 bikini    PILONIDAL CYST EXCISION      [place] HYSTEROSCOPY,W/ENDO BX N/A 10/18/2017    Procedure: HYSTEROSCOPY AND FRACTIONAL DILATATION AND CURRETTAGE;  Surgeon: Du Torres MD;  Location: [facility] MAIN OR;  Service: Gynecology    TUBAL LIGATION  2016    WISDOM TOOTH EXTRACTION         Current Outpatient Medications   Medication Sig Dispense Refill    albuterol (PROAIR HFA) 90 mcg/act inhaler Inhale 2 puffs every 4 (four) hours as needed for wheezing 8 5 g 0    benzonatate (TESSALON) 200 MG capsule Take 1 capsule (200 mg total) by mouth 3 (three) times a day as needed for cough (Patient not taking: Reported on 3/4/2019) 30 capsule 3    benzonatate (TESSALON) 200 MG capsule Take 1 capsule (200 mg total) by mouth 3 (three) times a day as needed for cough 20 capsule 0    Biotin 10 MG TABS Take by mouth      brompheniramine-pseudoephedrine-DM 30-2-10 MG/5ML syrup Take 5 mL by mouth 4 (four) times a day as needed for allergies 240 mL 0    cholecalciferol (VITAMIN D3) 1,000 units tablet Take 1,000 Units by mouth daily      cyclobenzaprine (FLEXERIL) 5 mg tablet Take 2 tablets (10 mg total) by mouth 3 (three) times a day as needed for muscle spasms (Patient not taking: Reported on 7/5/2019) 30 tablet 0    doxycycline hyclate (VIBRAMYCIN) 100 mg capsule Take 1 capsule (100 mg total) by mouth every 12 (twelve) hours for 7 days 14 capsule 0    fluconazole (DIFLUCAN) 150 mg tablet Take 1 tablet (150 mg total) by mouth once for 1 dose 1 tablet 0    fluticasone (FLONASE) 50 mcg/act nasal spray instill 2 sprays into each nostril once daily 16 g 4    fluticasone (FLOVENT HFA) 110 MCG/ACT inhaler Inhale 1 puff 2 (two) times a day Rinse mouth after use   1 Inhaler 0    folic acid (FOLVITE) 1 mg tablet       gabapentin (NEURONTIN) 600 MG tablet Take 1 tablet (600 mg total) by mouth 2 (two) times a day for 30 days 60 tablet 0    hydroxychloroquine (PLAQUENIL) 200 mg tablet Take 200 mg by mouth 2 (two) times a day  0    ibuprofen (MOTRIN) 600 mg tablet Take 1 tablet (600 mg total) by mouth every 8 (eight) hours as needed for mild pain 30 tablet 0    methotrexate 2 5 mg tablet Take 10 mg by mouth once a week      naproxen (NAPROSYN) 500 mg tablet take 1 tablet by mouth twice a day with MEALS (Patient not taking: Reported on 3/11/2019) 60 tablet 0    Omega-3 Fatty Acids (FISH OIL) 1,000 mg Take 1,000 mg by mouth daily      omeprazole (PriLOSEC) 20 mg delayed release capsule take 1 capsule by mouth once daily 30 capsule 0    sodium chloride (ALTAMIST SPRAY) 0 65 % nasal spray 1 spray into each nostril      SUMAtriptan (IMITREX) 100 mg tablet TAKE 1 TABLET BY MOUTH ONCE AS NEEDED FOR MIGRAINE TAKE WITHIN 30 MINUTES OF MIGRAINE AS DIRECTED 9 tablet 0     No current facility-administered medications for this visit  No Known Allergies    Review of Systems   Constitutional: Negative for chills and fever  HENT: Positive for congestion, ear pain, postnasal drip, sinus pressure, sinus pain and sneezing  Negative for sore throat  Respiratory: Negative for cough and shortness of breath  Cardiovascular: Negative for chest pain and leg swelling  Gastrointestinal: Negative for abdominal pain, constipation, nausea and vomiting  Musculoskeletal: Negative for back pain  Neurological: Positive for headaches  Negative for dizziness, weakness and light-headedness  Psychiatric/Behavioral: Negative for agitation  Video Exam    There were no vitals filed for this visit  Physical Exam  HENT:      Head: Normocephalic and atraumatic  Eyes:      General:         Right eye: No discharge  Left eye: No discharge  Conjunctiva/sclera: Conjunctivae normal    Neurological:      Mental Status: She is alert  Mental status is at baseline  Psychiatric:         Mood and Affect: Mood normal          Behavior: Behavior normal           I spent 10 minutes directly with the patient during this visit      VIRTUAL VISIT DISCLAIMER    Teddylesley Evans acknowledges that she has consented to an online visit or consultation  She understands that the online visit is based solely on information provided by her, and that, in the absence of a face-to-face physical evaluation by the physician, the diagnosis she receives is both limited and provisional in terms of accuracy and completeness  This is not intended to replace a full medical face-to-face evaluation by the physician   Teddy Evans understands and accepts these terms

## 2021-05-24 ENCOUNTER — TELEPHONE (OUTPATIENT)
Dept: FAMILY MEDICINE CLINIC | Facility: CLINIC | Age: 46
End: 2021-05-24

## 2021-05-24 DIAGNOSIS — B37.3 YEAST VAGINITIS: Primary | ICD-10-CM

## 2021-05-24 RX ORDER — FLUCONAZOLE 150 MG/1
TABLET ORAL
Qty: 2 TABLET | Refills: 0 | Status: SHIPPED | OUTPATIENT
Start: 2021-05-24 | End: 2021-05-28

## 2021-05-26 DIAGNOSIS — K21.9 GERD WITHOUT ESOPHAGITIS: ICD-10-CM

## 2021-05-26 RX ORDER — OMEPRAZOLE 20 MG/1
CAPSULE, DELAYED RELEASE ORAL
Qty: 30 CAPSULE | Refills: 0 | Status: SHIPPED | OUTPATIENT
Start: 2021-05-26

## 2021-06-04 DIAGNOSIS — G43.009 MIGRAINE WITHOUT AURA AND WITHOUT STATUS MIGRAINOSUS, NOT INTRACTABLE: ICD-10-CM

## 2021-06-06 RX ORDER — SUMATRIPTAN 100 MG/1
TABLET, FILM COATED ORAL
Qty: 9 TABLET | Refills: 0 | Status: SHIPPED | OUTPATIENT
Start: 2021-06-06 | End: 2022-06-09 | Stop reason: SDUPTHER

## 2021-07-13 DIAGNOSIS — J06.9 ACUTE URI: ICD-10-CM

## 2021-07-15 RX ORDER — FLUTICASONE PROPIONATE 50 MCG
SPRAY, SUSPENSION (ML) NASAL
Qty: 18 ML | Refills: 3 | Status: SHIPPED | OUTPATIENT
Start: 2021-07-15 | End: 2021-10-12

## 2021-10-12 DIAGNOSIS — J06.9 ACUTE URI: ICD-10-CM

## 2021-10-12 RX ORDER — FLUTICASONE PROPIONATE 50 MCG
SPRAY, SUSPENSION (ML) NASAL
Qty: 16 G | Refills: 2 | Status: SHIPPED | OUTPATIENT
Start: 2021-10-12

## 2021-10-28 ENCOUNTER — TELEPHONE (OUTPATIENT)
Dept: NEUROLOGY | Facility: CLINIC | Age: 46
End: 2021-10-28

## 2021-12-17 ENCOUNTER — TELEMEDICINE (OUTPATIENT)
Dept: FAMILY MEDICINE CLINIC | Facility: CLINIC | Age: 46
End: 2021-12-17
Payer: COMMERCIAL

## 2021-12-17 VITALS — TEMPERATURE: 99.4 F | HEIGHT: 67 IN | WEIGHT: 168 LBS | BODY MASS INDEX: 26.37 KG/M2

## 2021-12-17 DIAGNOSIS — R07.89 CHEST TIGHTNESS: ICD-10-CM

## 2021-12-17 DIAGNOSIS — Z20.822 EXPOSURE TO COVID-19 VIRUS: ICD-10-CM

## 2021-12-17 DIAGNOSIS — B34.9 VIRAL ILLNESS: Primary | ICD-10-CM

## 2021-12-17 PROCEDURE — 99213 OFFICE O/P EST LOW 20 MIN: CPT | Performed by: NURSE PRACTITIONER

## 2021-12-17 RX ORDER — ALBUTEROL SULFATE 90 UG/1
2 AEROSOL, METERED RESPIRATORY (INHALATION) EVERY 6 HOURS PRN
Qty: 18 G | Refills: 5 | Status: SHIPPED | OUTPATIENT
Start: 2021-12-17 | End: 2022-01-25 | Stop reason: SDUPTHER

## 2021-12-17 RX ORDER — DIAZEPAM 5 MG/1
TABLET ORAL
COMMUNITY
Start: 2021-12-13

## 2021-12-17 RX ORDER — TRAMADOL HYDROCHLORIDE 50 MG/1
50 TABLET ORAL EVERY 8 HOURS PRN
COMMUNITY
Start: 2021-11-12 | End: 2022-11-12

## 2021-12-28 ENCOUNTER — OFFICE VISIT (OUTPATIENT)
Dept: NEUROLOGY | Facility: CLINIC | Age: 46
End: 2021-12-28
Payer: COMMERCIAL

## 2021-12-28 VITALS
WEIGHT: 169 LBS | TEMPERATURE: 97.4 F | SYSTOLIC BLOOD PRESSURE: 103 MMHG | HEART RATE: 73 BPM | DIASTOLIC BLOOD PRESSURE: 63 MMHG | BODY MASS INDEX: 26.53 KG/M2 | HEIGHT: 67 IN

## 2021-12-28 DIAGNOSIS — H91.90 HEARING IMPAIRMENT: Primary | ICD-10-CM

## 2021-12-28 DIAGNOSIS — R42 DIZZINESS AND GIDDINESS: ICD-10-CM

## 2021-12-28 DIAGNOSIS — I95.1 ORTHOSTATIC HYPOTENSION: ICD-10-CM

## 2021-12-28 DIAGNOSIS — R42 VERTIGO: ICD-10-CM

## 2021-12-28 DIAGNOSIS — I95.9 HYPOTENSION: ICD-10-CM

## 2021-12-28 PROCEDURE — 1036F TOBACCO NON-USER: CPT | Performed by: PSYCHIATRY & NEUROLOGY

## 2021-12-28 PROCEDURE — 99205 OFFICE O/P NEW HI 60 MIN: CPT | Performed by: PSYCHIATRY & NEUROLOGY

## 2021-12-28 PROCEDURE — 3008F BODY MASS INDEX DOCD: CPT | Performed by: PSYCHIATRY & NEUROLOGY

## 2022-01-04 ENCOUNTER — EVALUATION (OUTPATIENT)
Dept: PHYSICAL THERAPY | Facility: CLINIC | Age: 47
End: 2022-01-04
Payer: COMMERCIAL

## 2022-01-04 DIAGNOSIS — R42 VERTIGO: Primary | ICD-10-CM

## 2022-01-04 DIAGNOSIS — H91.90 HEARING IMPAIRMENT: ICD-10-CM

## 2022-01-04 PROCEDURE — 97162 PT EVAL MOD COMPLEX 30 MIN: CPT

## 2022-01-04 NOTE — PROGRESS NOTES
PT Evaluation   Today's date: 2022  Patient name: Jesus Sheets  : 1975  MRN: [id number]  Referring provider: Dede Thomason MD  Dx:   Encounter Diagnosis     ICD-10-CM    1  Hearing impairment  H91 90 Ambulatory referral to Physical Therapy   2  Vertigo  R42 Ambulatory referral to Physical Therapy         Assessment  Assessment details: Patient is a 55 y o  Female who presents to skilled outpatient PT with complaints of intermittent dizziness and room spinning with no consistent causative factors identified  Positional testing was performed with no nystagmus noted with R or L [employer] or Roll testing  Patient reported slight dizziness with R [employer] and Roll test which resolved within 15 seconds  +Dizziness with return to sitting from all supine testing noted  Patient cervical ROM was limited in extension, R and L rotation, and R and L sidebend with complaints of pain with all motions  Negative head shake and thrust noted, however patient limited in testing position due to cervical pain and guarding  Patient attempted VOR at 70bpm with no complaints of symptoms only limitations due to cervical pain  Physical therapy is not recommended at this time for vestibular dysfunction as no impairments were found upon testing, with patient educated on potential cervicogenic causes of dizziness  Patient instructed to contact the clinic if needed or following results of future tests  Patient has established relationship with another PT regarding her neck and back pain, so no treatment indicated at this site for these impairments  Patient verbalized understanding of POC  Please contact me if you have any questions or recommendations  Thank you for the referral and the opportunity to share in [facility] care        Impairments: abnormal or restricted ROM, poor posture and pain with function  Understanding of Dx/Px/POC: good  Prognosis: good      Goals  Skilled physical therapy not indicated at this time for vestibular dysfunction, with treatment for cervical and lumbar pain already being addressed at another facility  Plan  Plan details: Skilled physical therapy not recommended at this time for vestibular dysfunction   Patient would benefit from: PT Eval  Plan of Care beginning date: 1/4/2022        Subjective Evaluation    History of Present Illness  Mechanism of injury:  Patient reports to PT with complaints of dizziness and room spinning  Patient reports she gets dizziness sometimes when laying down, or randomly while standing up, with occasionally flutter feeling in R ear before onset of symptoms, however sometimes the onset is random and quick with no flutter  Patient denies any causative factor that leads to dizziness  Patient reports dizziness last about 30 sec to 1 min at a time  Patient has to hold onto items to keep from falling while she lets it pass  Patient reports she gets symptoms at least once a week if not more on worse weeks  Patient has come to PT after dealing with this for many years after seeing other doctors and suggestions from her boyfriend to seek treatment  Patient states she is getting an MRI done on her brain, cervical and lumbar region in future, as well as a hearing exam and tilt table testing due to low blood pressure and orthostatic symptoms  Dizziness Subjective  How long does dizziness last: 30 sec to 1 min  How would you describe the dizziness: room spinning sometimes (with flutter) otherwise just quick onset of dizziness  Rolling in bed: Yes (sometimes)  Supine to/from sit: Yes (sometimes)      Pain: suffers from cervical and low back pain as well as migraines  Social Support  Steps to enter house: 20  Lives on second floor   Lives with: with children (8 y o twin and 5 y  o)    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   However feels she may not be able to continue working due to mixture of health issues  Treatments  Previous treatment: physical therapy many years ago for dizziness   Current treatment: physical therapy for cervical and low back pain  Diagnostic Testing: will get MRI, tilt table test, hearing test in future  Objective     Vestibular Objective  Cervical Spine AROM:  - Flexion: WFL pain throughout range  - Extension: moderate limitation pain throughout range  - R Rotation: minimal limitation pain throughout range  - L Rotation: moderate limitation pain throughout range  - R Lateral Flexion: minimal limitation pain throughout range  - L Lateral Flexion: minimal limitation pain throughout range    Integrity Testing  - mVBI: WNL  - Sharp Jordan: WNL   - Alar Stability Test: WNL  - Posture: rounded shoulders with flexed posture in sitting  Coordination Screen  - Dysmetria: WNL   - Dysdiadochokinesia: WNL     Oculomotor Screen  - Baseline Symptoms: 0/10  - Baseline Observation: no dizziness currently on limited by cervical pain   - Gaze Holding Nystagmus: H: Normal Dizziness: 0/10,   - Spontaneous Nystagmus Room Light: H: Normal Dizziness: 0/10,   - Smooth Pursuits (central): H: Normal Dizziness: 0/10, Observation: slow eye movement  - Saccades (central): H: Normal Dizziness: 0/10, Observation: slow eye movement   - Near Point Convergence  (normal: < 4"/10 cm - central):  Abnormal: 15cm  Dizziness: 0/10,   - VORx1: H: Normal Dizziness: 0/10, Observation: up to 70 bpm  - VOR Cancel (central):NT  - Head Thrust (moderate to severe hypofunction): negative head thrust noted, however patient limited in testing position due to cervical pain  - Head Shaking Test (mild hypofunction): negative head shake noted, however patient limited in testing position due to cervical pain    Positional testing:   [employer]: negative for nystagmus on R or L however reported symptoms on R therefore Epley maneuver performed   Roll testing: negative for nystagmus on R or L however reported very mild symptoms on R that subsided in 10 seconds   Patient reported greatest increase in symptoms going from supine to sit between testing L and R canals  Outcome Measures Initial Eval  1/4/2022        mCTSIB  - FTEO (firm)  - FTEC (firm)  - FTEO (foam)  - FTEC (foam)   NT        DGI NT        FGA NT        DHI NT        JPET NT                                                          Precautions:   Past Medical History:   Diagnosis Date    Acquired ankle/foot deformity     last assessed: 8/29/2017    Anxiety     Chronic pain disorder     right foot plantar    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    Dysfunctional uterine bleeding     Last assessed: 1/9/2018    Headache     Lupus ([address] )     Mass of knee     last assessed: 5/7/2014    Mild acid reflux     Paronychia of finger     last assessed: 11/20/2013    Paronychia of toe     last asssessed: 9/28/2016  right foot    Patellofemoral dysfunction     last assessed: 3/21/2014    PCOS (polycystic ovarian syndrome) 12/15/2007    Pes planus, congenital     last assessed: 8/29/2017    Plantar fasciitis of right foot     Plantar fibromatosis     last assessed: 9/28/2017    PONV (postoperative nausea and vomiting)     Trichomoniasis     last assessed: 1/16/2015    Vaginal candidiasis     last assessed: 11/30/2016    Varicose veins of lower extremity     last assessed: 3/5/2014    Vertigo     last assessed: 3/5/2014    Viral gastroenteritis     last assessed: 6/4/2015       Patient evaluated and treated by KSENIA Gimenez under the direct supervision of [phone] with discussion of diagnosis, prognosis, and POC

## 2022-01-14 ENCOUNTER — HOSPITAL ENCOUNTER (OUTPATIENT)
Dept: NON INVASIVE DIAGNOSTICS | Facility: HOSPITAL | Age: 47
Discharge: HOME | End: 2022-01-14
Attending: PSYCHIATRY & NEUROLOGY | Admitting: PSYCHIATRY & NEUROLOGY
Payer: COMMERCIAL

## 2022-01-14 DIAGNOSIS — I95.1 ORTHOSTATIC HYPOTENSION: ICD-10-CM

## 2022-01-14 DIAGNOSIS — I95.9 HYPOTENSION: ICD-10-CM

## 2022-01-14 DIAGNOSIS — R42 DIZZINESS AND GIDDINESS: ICD-10-CM

## 2022-01-14 PROCEDURE — 93660 TILT TABLE EVALUATION: CPT | Performed by: INTERNAL MEDICINE

## 2022-01-14 PROCEDURE — 93660 TILT TABLE EVALUATION: CPT

## 2022-01-14 NOTE — NURSING NOTE
30 minute tilt table test completed  VSS  Patient stated no symptoms throughout tilt  Patient tolerated well and discharged from cath lab in usual state of health

## 2022-01-19 ENCOUNTER — OFFICE VISIT (OUTPATIENT)
Dept: AUDIOLOGY | Facility: CLINIC | Age: 47
End: 2022-01-19
Payer: COMMERCIAL

## 2022-01-19 DIAGNOSIS — H90.3 SENSORY HEARING LOSS, BILATERAL: Primary | ICD-10-CM

## 2022-01-19 PROCEDURE — 92567 TYMPANOMETRY: CPT | Performed by: AUDIOLOGIST

## 2022-01-19 PROCEDURE — 92557 COMPREHENSIVE HEARING TEST: CPT | Performed by: AUDIOLOGIST

## 2022-01-19 NOTE — PROGRESS NOTES
HEARING EVALUATION    Name:  Charisse Veras  :  1975  Age:  55 y o  Date of Evaluation: 22     History: Tinnitus and Dizziness  Reason for visit: Charisse Veras was seen today at the request of Dr Meena Handy for an evaluation of hearing  Nj Cullen reported dizziness that requires her to hold on to stationary objects when it appears  She reported occasional tinnitus and left ear aural fullness  Mikelnidhi Cullen received a neurology evaluation and cardiac evaluation in response to the dizziness  Nj Cullen reported that she experiences the dizziness a few times per week  Reportedly, Belén's hearing seems to be normal           EVALUATION:    Otoscopic Evaluation:   Right Ear: Clear and healthy ear canal and tympanic membrane   Left Ear: Clear and healthy ear canal and tympanic membrane    Tympanometry:   Right: Type A - normal middle ear pressure and compliance   Left: Type A - normal middle ear pressure and compliance    Audiogram Results:  Pure tone testing revealed a slight to mild sensorineural hearing loss from 250-1000 Hz rising to normal from 9887-2639 Hz bilaterally  SRT and PTA are in agreement indicating good test reliability  Word recognition scores were excellent bilaterally  *see attached audiogram      RECOMMENDATIONS:  Consult ENT  6 month audiologic follow-up  PATIENT EDUCATION:   These results and recommendations were discussed with Nj Cullen  I walked Nj Cullen upstairs to the ENT Department to make the ENT appointment which was scheduled for one week from today  Nj Cullen also scheduled the 6 month audiologic follow-up for 2022  Questions were addressed and the patient was encouraged to contact our department should concerns arise        Karrie Fletcher , [address] [facility] #[id number]

## 2022-01-25 ENCOUNTER — OFFICE VISIT (OUTPATIENT)
Dept: OTOLARYNGOLOGY | Facility: CLINIC | Age: 47
End: 2022-01-25
Payer: COMMERCIAL

## 2022-01-25 VITALS — WEIGHT: 165 LBS | TEMPERATURE: 97 F | BODY MASS INDEX: 25.9 KG/M2 | HEIGHT: 67 IN

## 2022-01-25 DIAGNOSIS — M54.2 NECK PAIN: ICD-10-CM

## 2022-01-25 DIAGNOSIS — R42 VERTIGO: ICD-10-CM

## 2022-01-25 DIAGNOSIS — R26.89 IMBALANCE: Primary | ICD-10-CM

## 2022-01-25 PROCEDURE — 99214 OFFICE O/P EST MOD 30 MIN: CPT | Performed by: NURSE PRACTITIONER

## 2022-01-25 PROCEDURE — 3008F BODY MASS INDEX DOCD: CPT | Performed by: PSYCHIATRY & NEUROLOGY

## 2022-01-25 NOTE — PATIENT INSTRUCTIONS
Autoimmune inner ear disease, Meniere's disease  Flonase (fluticasone) one spray each nostril twice day  Return to PT for balance evaluation  Proceed with MRI as scheduled  Obtain lab results, more labs depending what been done so far  Possible VNG testing depending on results all tests

## 2022-01-25 NOTE — ASSESSMENT & PLAN NOTE
On exam noted Romberg slight sway, tandem walking with moderate correction  Bilateral eac clear, no cerumen impaction no serous fluid  Discussed possible causes of vertigo including brain, cardiac, autoimmune, Otitis media, sinusitis, and inner ear concerns including possible Autoimmune inner ear disease vs Meniere's disease  Reviewed recent audiogram indicating bilateral borderline hearing with slight change at 1K  Tymps type A  Treatment options include at home epley's, lab studies, vestibular therapy, nasal steroids, VNG testing, neurology consultation, MRI brain with IAC        After discussion agreed to Flonase (fluticasone) one spray each nostril twice day, Return to PT for balance evaluation, Neck and balance therapy, Proceed with MRI as scheduled, Obtain lab results from [facility] [employer], more labs depending what been done so far, Possible VNG testing depending on results all tests    Follow up in 6 weeks

## 2022-01-25 NOTE — PROGRESS NOTES
Assessment/Plan:    Imbalance  On exam noted Romberg slight sway, tandem walking with moderate correction  Bilateral eac clear, no cerumen impaction no serous fluid  Discussed possible causes of vertigo including brain, cardiac, autoimmune, Otitis media, sinusitis, and inner ear concerns including possible Autoimmune inner ear disease vs Meniere's disease  Reviewed recent audiogram indicating bilateral borderline hearing with slight change at 1K  Tymps type A  Treatment options include at home epley's, lab studies, vestibular therapy, nasal steroids, VNG testing, neurology consultation, MRI brain with IAC  After discussion agreed to Flonase (fluticasone) one spray each nostril twice day, Return to PT for balance evaluation, Neck and balance therapy, Proceed with MRI as scheduled, Obtain lab results from [facility], more labs depending what been done so far, Possible VNG testing depending on results all tests    Follow up in 6 weeks                         Diagnoses and all orders for this visit:    Imbalance  -     Ambulatory Referral to Physical Therapy; Future    Vertigo  -     Ambulatory Referral to Physical Therapy; Future    Neck pain  -     Ambulatory Referral to Physical Therapy; Future          Subjective:      Patient ID: Darlene Barajas is a 55 y o  female  Presents today as a new patient due to dizziness, imbalance, and tinnitus  Dizziness over past 6 to 7 months  Sensation off balance  Flutter sensation in ear and then gets dizzy  PT evaluation indicating no BPPV  Hearing well with ringing in ears  Occurs couple times per day  No pain in ears  No headaches associated with dizziness or tinnitus  Recent neurology evaluation   Pending MRI brain with IAC            The following portions of the patient's history were reviewed and updated as appropriate: allergies, current medications, past family history, past medical history, past social history, past surgical history and problem list     Review of Systems   Constitutional: Negative  HENT: Positive for tinnitus  Negative for congestion, ear discharge, ear pain, hearing loss, nosebleeds, postnasal drip, rhinorrhea, sinus pressure, sinus pain, sore throat and voice change  Eyes: Negative  Respiratory: Negative for chest tightness and shortness of breath  Cardiovascular: Negative  Gastrointestinal: Negative  Endocrine: Negative  Musculoskeletal: Negative  Skin: Negative for color change  Neurological: Positive for dizziness and headaches  Negative for numbness  Psychiatric/Behavioral: Negative  Objective:      Temp (!) 97 °F (36 1 °C) (Temporal)   Ht 5' 7" (1 702 m)   Wt 74 8 kg (165 lb)   BMI 25 84 kg/m²          Physical Exam  Constitutional:       Appearance: She is well-developed  HENT:      Head: Normocephalic  Right Ear: Hearing, tympanic membrane, ear canal and external ear normal  No decreased hearing noted  No drainage or tenderness  Tympanic membrane is not perforated or erythematous  Left Ear: Hearing, tympanic membrane, ear canal and external ear normal  No decreased hearing noted  No drainage or tenderness  Tympanic membrane is not perforated or erythematous  Nose: Nose normal  No nasal deformity or septal deviation  Mouth/Throat:      Mouth: Mucous membranes are not pale and not dry  No oral lesions  Dentition: Normal dentition  Pharynx: Uvula midline  No oropharyngeal exudate  Neck:      Thyroid: Thyromegaly present  Trachea: No tracheal deviation  Cardiovascular:      Rate and Rhythm: Normal rate  Pulmonary:      Effort: Pulmonary effort is normal  No accessory muscle usage or respiratory distress  Musculoskeletal:      Right shoulder: Normal range of motion  Cervical back: Full passive range of motion without pain, normal range of motion and neck supple  Lymphadenopathy:      Cervical: No cervical adenopathy     Skin: General: Skin is warm and dry  Neurological:      Mental Status: She is alert and oriented to person, place, and time  Cranial Nerves: No cranial nerve deficit  Sensory: No sensory deficit  Psychiatric:         Behavior: Behavior is cooperative

## 2022-02-02 ENCOUNTER — EVALUATION (OUTPATIENT)
Dept: PHYSICAL THERAPY | Facility: CLINIC | Age: 47
End: 2022-02-02
Payer: COMMERCIAL

## 2022-02-02 DIAGNOSIS — R42 VERTIGO: Primary | ICD-10-CM

## 2022-02-02 DIAGNOSIS — R26.89 IMBALANCE: ICD-10-CM

## 2022-02-02 DIAGNOSIS — M54.2 NECK PAIN: ICD-10-CM

## 2022-02-02 DIAGNOSIS — E55.9 VITAMIN D DEFICIENCY: ICD-10-CM

## 2022-02-02 DIAGNOSIS — E07.89 THYROID FULLNESS: ICD-10-CM

## 2022-02-02 PROCEDURE — 97163 PT EVAL HIGH COMPLEX 45 MIN: CPT

## 2022-02-02 NOTE — PROGRESS NOTES
PT Evaluation          Insurance:  A/CMS Eval/ Re-eval POC expires Jeremías Frye #/ Referral # Total   TBD Start date  Expiration date Extension  Visit limitation? PT only or  PT+OT? Co-Insurance   [facility] 22  Submitted TBD    [facility] PT No                                                                 AUTH #: TBD Date                Used                Remaining                         Today's date: 2022  Patient name: Lynsey Bolivar  : 1975  MRN: [id number]  Referring provider: NIDIA Ball  Dx:   Encounter Diagnosis     ICD-10-CM    1  Vertigo  R42 Ambulatory Referral to Physical Therapy   2  Imbalance  R26 89 Ambulatory Referral to Physical Therapy   3  Neck pain  M54 2 Ambulatory Referral to Physical Therapy         Assessment  Assessment details: Patient is a 55year old female presenting to skilled OPPT for IE with complaints of dizziness that have in turn limited safe mobility at home and in the community  Cervical spine cleared per normal findings for the mVBI, Sharp Jordan, and Alar Ligament Tests  Coordination within normal limits  Cervical spine AROM revealed minimal limitations with extension and (R) rotation and moderate limitation with B/L lateral flexion  Performed positional testing secondary to patient's reports of occasional room spinning dizziness, no significant findings/all positions (-)  Oculomotor screen revealed (+) head thrust for retinal slip to L and abnormal results of DVA, likely indicating moderate to severe hypofunction and abnormal gaze stabilization, respectively  mCTSIB revealed decreased time for FTEC on foam condition, likely suggesting increased reliance on visual system for balance  Patient verbalized concern for attending PT due to having three young children at home and beginning a full time job next week   Patient educated on vestibular hypofunction, treatment plan, and option to come in and establish HEP; she verbalized good understanding  Plan to trial vestibular rehab and VOR based exercises for 2-4 weeks and monitor patient response  She will benefit from skilled OPPT services to reduce symptoms of dizziness in order to return to PLOF  Patient verbalized understanding of POC  Please contact me if you have any questions or recommendations  Thank you for the referral and the opportunity to share in Lois care        Cut off score   All date taken from APTA Neuro Section or Rehab Measures    DGI:  MDC for Vestibular Disorders: 4 points  [address] for Geriatrics/Community Dwelling Older Adults: 3 Points  Falls risk cut off: <19/24    FGA:  MCID: 4 points  Geriatrics/Community Dwelling Older Adults: </= 22/30 fall risk  Geriatrics/Community Dwelling Older Adults: </= 20/30 unexplained falls in the next 6 months  Parkinsons: </= 18/30 fall risk    mCTSIB (normed on ages 19-56, lower number is less sway or better static balance)  Eyes open firm surface (norm 0 21-0 48)  Eyes closed firm surface (norm 0 48-0 99)  Eyes open foam surface (norm 0 38-0 71)  Eyes closed foam surface (norm 0 70-2 22)    DHI:  0-39: low perception of handicap  40-69: moderate perception of handicap  : severe perception of handicap  > 60: increased risk for falls    Joint Position Error Testing (JPET):  > 4 5 degrees: abnormal joint proprioception        Impairments: impaired balance, lacks appropriate HEP and poor posture  Understanding of Dx/Px/POC: excellent  Prognosis: good      Goals    Vestibular Short Term Goals:  - Patient will display improved cervical spine STM by 50% to encourage improved AROM during functional tasks  - Patient will be independent with simple HEP  - Patient will tolerate 60 seconds of oculomotor exercises with minimal increase in symptoms  - Patient will demonstrate 10% decrease in symptom severity scoring with independent use of modalities  - Patient will demonstrate improved soft tissue density t/o cervical region with independent self-release  - Patient will be able to tolerate 30 seconds with eyes closed on foam surface without any loss of balance demonstrating improvement in vestibular system  - Patient will improve with DGI by 3 points per [address] to promote improved safety with dynamic tasks  - Patient will improve FGA score by 4 points per MDC to promote improved safety with dynamic tasks    Vestibular Long Term Goals:  - Patient will display decreased forward head and rounded shoulders to promote improved resting posture and cervical mobility  - Patient will be independent with complex HEP  - Patient will tolerate >=2 minutes of oculomotor exercises to facilitate return to reading and computer work  - Patient will report >= 50% improvement on symptom severity scoring  - Patient will demonstrate ability to perform HT in gait without veering  - Patient will demonstrate normalized soft tissue t/o  - Patient will score low risk for falls with DGI test with score of 19/24 or higher per current research data  - Patient will score low risk for falls with FGA test with score of 23/30 or higher per current research data  - Patient will report baseline dizziness of 1/10 or less   - Patient will report 2/10 dizziness or less with visual stimulating surround with duration of 2 minutes   - Patient will report subjective improvement to 90% or higher to promote return to PLOF  - Patient will complete work related tasks without exacerbation of symptoms in order to maximize function and promote return to work      Plan  Plan details: vestibular rehab, FGA/DGI  Patient would benefit from: PT Eval and Skilled PT  Planned modality interventions: Biofeedback, Cryotherapy, TENS and Thermotherapy: Hydrocollator Packs  Planned therapy interventions: balance, functional ROM exercises, HEP, neuromuscular re-education, patient education, postural training, strengthening, stretching, therapeutic activities, therapeutic exercises, massage and work reintegration  Frequency: 2x/wk  Duration in weeks: 12  Plan of Care beginning date: 2/2/22  Plan of Care expiration date: 12 weeks - 4/27/2022  Treatment plan discussed with: Patient        Subjective Evaluation    History of Present Illness  Mechanism of injury: Patient is a 55year old female presenting to skilled OPPT for IE with complaints of dizziness  Patient evaluated for dizziness approximately one month ago at [facility], but she did not pursue further therapy services as she was already seeing PT at another location for cervical pain and there were no significant vestibular findings  Patient is unable to recall exactly when the episodes of dizziness started, but approximates it has been at least a year  The episodes of dizziness are "random" in nature and can come on at any time, with the length of time the dizziness lasts varying  She notes an occasional flutter in her (R) ear as well as occasional room spinning dizziness  She denied dizziness being related to positional changes, stating it can "come out of nowhere even when I am standing " She often has to grab for a stationary object to steady herself  She recently had a hearing test that revealed mild senorineural hearing loss B/L  She is scheduled for an MRI tomorrow 2/3/22  PMH significant for systemic lupus erythematosus       Dizziness Subjective  How long does dizziness last: varies  How would you describe the dizziness: sometimes room spinning  Rolling in bed: No  Supine to/from sit: No    Social Support  Steps to enter house: flight  Stairs in house: none   Lives in: apartment  Lives with: children    Employment status: personal care aide, 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  Hand dominance: (R)    Treatments  Previous treatment: PT IE for dizziness, PT for cervical spine pain  Current treatment: none  Diagnostic Testing: MRI scheduled for 2/3/22      Objective     Vestibular Objective  Cervical Spine AROM:  - Flexion: WFL no pain  - Extension: minimal limitation no pain  - R Rotation: minimal limitation pain at end range  - L Rotation: WFL no pain  - R Lateral Flexion: moderate limitation no pain  - L Lateral Flexion: moderate limitation pain at end range    Integrity Testing  - mVBI: normal  - Sharp Jordan: intact  - Alar Stability Test: intact  - Posture: rounded shoulders, forward head     Coordination Screen  - Dysmetria: intact  - Dysdiadochokinesia: intact    Oculomotor Screen  - Baseline Symptoms: 0/10  - Gaze Holding Nystagmus: H: Normal and V: Normal Dizziness: 0/10, Observation: normal  - Spontaneous Nystagmus Room Light: H: Normal and V: Normal Dizziness: 0/10, Observation: normal  - Smooth Pursuits (central): H: Normal and V: Normal Dizziness: 0/10, Observation: normal  - Saccades (central): H: Normal and V: Normal Dizziness: 0/10, Observation: normal  - Sunflower[employer] (normal: < 4"/10 cm - central): H: Normal and V: Normal Dizziness: 0/10, Observation: equal convergence B/L  - VORx1: H: Normal and V: Normal Dizziness: 0/10, Observation: able to maintain  - VOR Cancel (central): H: Normal and V: Normal Dizziness: 0/10, Observation: normal  - Head Thrust (moderate to severe hypofunction): H: Abnormal Dizziness: 0/10, Observation: unable to maintain gaze w/ head thrust to (L)  - Head Shaking Test (mild hypofunction): H: Normal Dizziness: 3/10, Observation: no nystagmus noted  - Dynamic Visual Acuity (2 Hz):   Static Head: 20/20 Line   Dynamic Head: 20/40 Line   Difference in number of lines: 3 (abnormal if 3 or >)    Positional Testing  - (R) Horizontal Roll: (-)  - (L) Horizontal Roll: (-)  - (R) [place]-Hallpike: (-)  - (L) Carla-Hallpike: (-)    Outcome Measures Initial Eval  2/2/22        mCTSIB  - FTEO (firm)  - FTEC (firm)  - FTEO (foam)  - FTEC (foam)   30 sec +  30 sec +  30 sec +  2 5 sec ++        DGI NT/24        FGA NT/30        DHI /100        JPET NT degrees Precautions: hx of Lupus  Past Medical History:   Diagnosis Date    Acquired ankle/foot deformity     last assessed: 8/29/2017    Anxiety     Chronic pain disorder     right foot plantar    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    Dysfunctional uterine bleeding     Last assessed: 1/9/2018    Headache     Lupus ([address] )     Mass of knee     last assessed: 5/7/2014    Mild acid reflux     Paronychia of finger     last assessed: 11/20/2013    Paronychia of toe     last asssessed: 9/28/2016   right foot    Patellofemoral dysfunction     last assessed: 3/21/2014    PCOS (polycystic ovarian syndrome) 12/15/2007    Pes planus, congenital     last assessed: 8/29/2017    Plantar fasciitis of right foot     Plantar fibromatosis     last assessed: 9/28/2017    PONV (postoperative nausea and vomiting)     Trichomoniasis     last assessed: 1/16/2015    Vaginal candidiasis     last assessed: 11/30/2016    Varicose veins of lower extremity     last assessed: 3/5/2014    Vertigo     last assessed: 3/5/2014    Viral gastroenteritis     last assessed: 6/4/2015

## 2022-02-03 ENCOUNTER — HOSPITAL ENCOUNTER (OUTPATIENT)
Dept: RADIOLOGY | Facility: HOSPITAL | Age: 47
Discharge: HOME | End: 2022-02-03
Attending: PSYCHIATRY & NEUROLOGY
Payer: COMMERCIAL

## 2022-02-03 DIAGNOSIS — R42 DIZZINESS AND GIDDINESS: ICD-10-CM

## 2022-02-03 DIAGNOSIS — R42 VERTIGO: ICD-10-CM

## 2022-02-03 DIAGNOSIS — H91.90 HEARING IMPAIRMENT: ICD-10-CM

## 2022-02-03 PROCEDURE — G1004 CDSM NDSC: HCPCS

## 2022-02-03 PROCEDURE — 70553 MRI BRAIN STEM W/O & W/DYE: CPT

## 2022-02-03 PROCEDURE — A9585 GADOBUTROL INJECTION: HCPCS | Performed by: PSYCHIATRY & NEUROLOGY

## 2022-02-03 RX ADMIN — GADOBUTROL 7 ML: 604.72 INJECTION INTRAVENOUS at 09:33

## 2022-02-13 LAB
25(OH)D3+25(OH)D2 SERPL-MCNC: 76.2 NG/ML (ref 30–100)
CALCIUM SERPL-MCNC: 9.6 MG/DL (ref 8.7–10.2)
PTH-INTACT SERPL-MCNC: 29 PG/ML (ref 15–65)
T4 SERPL-MCNC: 7.8 UG/DL (ref 4.5–12)
TSH SERPL DL<=0.005 MIU/L-ACNC: 0.52 UIU/ML (ref 0.45–4.5)

## 2022-03-07 ENCOUNTER — OFFICE VISIT (OUTPATIENT)
Dept: OTOLARYNGOLOGY | Facility: CLINIC | Age: 47
End: 2022-03-07
Payer: COMMERCIAL

## 2022-03-07 VITALS — TEMPERATURE: 98.4 F | HEIGHT: 67 IN | WEIGHT: 162 LBS | BODY MASS INDEX: 25.43 KG/M2

## 2022-03-07 DIAGNOSIS — M54.2 NECK PAIN: ICD-10-CM

## 2022-03-07 DIAGNOSIS — G43.019 INTRACTABLE MIGRAINE WITHOUT AURA AND WITHOUT STATUS MIGRAINOSUS: ICD-10-CM

## 2022-03-07 DIAGNOSIS — H93.13 BILATERAL TINNITUS: ICD-10-CM

## 2022-03-07 DIAGNOSIS — R26.89 IMBALANCE: ICD-10-CM

## 2022-03-07 DIAGNOSIS — R42 VERTIGO: Primary | ICD-10-CM

## 2022-03-07 PROCEDURE — 99214 OFFICE O/P EST MOD 30 MIN: CPT | Performed by: NURSE PRACTITIONER

## 2022-03-07 PROCEDURE — 3008F BODY MASS INDEX DOCD: CPT | Performed by: NURSE PRACTITIONER

## 2022-03-07 PROCEDURE — 1036F TOBACCO NON-USER: CPT | Performed by: NURSE PRACTITIONER

## 2022-03-07 NOTE — PROGRESS NOTES
Assessment/Plan:    Vertigo  On exam noted Romberg slight sway, tandem walking with moderate correction  Bilateral eac clear, no cerumen impaction no serous fluid  Discussed possible causes of vertigo including brain, cardiac, autoimmune, Otitis media, sinusitis, and inner ear concerns including possible Autoimmune inner ear disease vs Meniere's disease  Reviewed recent audiogram indicating bilateral borderline hearing with slight change at 1K  Tymps type A      Treatment options include at home epley's, lab studies, vestibular therapy, nasal steroids, VNG testing, neurology consultation, MRI brain with IAC        After discussion agreed to Flonase (fluticasone) one spray each nostril twice day, Return to PT for balance evaluation, Neck and balance therapy, Proceed with MRI as scheduled, Obtain lab results from [facility], more labs depending what been done so far, Possible VNG testing depending on results all tests     Follow up in 6 weeks       Diagnoses and all orders for this visit:    Vertigo    Imbalance    Neck pain    Intractable migraine without aura and without status migrainosus          Subjective:      Patient ID: Erich Mckenna is a 55 y o  female  Presents today as a follow up due to dizziness, imbalance, and tinnitus  Dizziness over past 8 or more months  Sensation off balance  Flutter sensation in ear and then gets dizzy  PT evaluation indicating no BPPV  Hearing well with ringing in ears  Occurs couple times per day  No pain in ears  No headaches associated with dizziness or tinnitus  Recent neurology evaluation  Pending MRI brain with IAC  Since last visit Ringing in ears both ears  Occurs every other day  Looking up for some time made her dizzy    Unable to complete PT        The following portions of the patient's history were reviewed and updated as appropriate: allergies, current medications, past family history, past medical history, past social history, past surgical history and problem list     Review of Systems      Objective:      Temp 98 4 °F (36 9 °C) (Temporal)   Ht 5' 7" (1 702 m)   Wt 73 5 kg (162 lb)   BMI 25 37 kg/m²          Physical Exam

## 2022-03-07 NOTE — ASSESSMENT & PLAN NOTE
Bilateral eac clear, no cerumen impaction no serous fluid  She has been unable to attend PT due to inability make time for appts  Due to   Discussed possible causes of vertigo including brain, cardiac, autoimmune, Otitis media, sinusitis, stress and inner ear concerns including possible Autoimmune inner ear disease vs Meniere's disease  Reviewed recent audiogram indicating bilateral borderline hearing with slight change at 1K  Tymps type A  Recent MRI brain with IAC negative for abnormalities  Recent labs reviewed, noted TSH level low normal and warrants close monitoring  Repeat TSH level in 6 months to one year      Discussed in great detail impact of social stressors with her son  impacting her overall health  Treatment options include at home epley's, lab studies, vestibular therapy, nasal steroids, VNG testing, neurology consultation      After discussion agreed to continue Flonase (fluticasone) one spray each nostril twice day, consider PT for balance evaluation, Neck and balance therapy, Possible VNG testing depending on results all tests    Agreed to home exercises as this may be only option for time constraints       Follow up in 8 weeks if needed

## 2022-03-08 PROBLEM — H93.13 BILATERAL TINNITUS: Status: ACTIVE | Noted: 2022-03-08

## 2022-03-08 NOTE — ASSESSMENT & PLAN NOTE
No significant findings on exam   No cerumen impaction, no serous fluid  Tinnitus most bothersome during the night  Reviewed nature of tinnitus and possible causes including medications, viral illness, stress, inner ear disorder, TMJ syndrome, or hearing changes  Discussed options for bilateral tinnitus including adding background noise, tinnitus retraining therapy, masking device, Resound tinnitus edgar  Consider Flonase daily for eustachian tube dysfunction  May use dental guard for TMJ, consider PT for TMJ  No specific medications or surgery indicated for treatment of tinnitus

## 2022-03-08 NOTE — PROGRESS NOTES
Assessment/Plan:    Vertigo  Bilateral eac clear, no cerumen impaction no serous fluid  She has been unable to attend PT due to inability make time for appts  Due to   Discussed possible causes of vertigo including brain, cardiac, autoimmune, Otitis media, sinusitis, stress and inner ear concerns including possible Autoimmune inner ear disease vs Meniere's disease  Reviewed recent audiogram indicating bilateral borderline hearing with slight change at 1K  Tymps type A  Recent MRI brain with IAC negative for abnormalities  Recent labs reviewed, noted TSH level low normal and warrants close monitoring  Repeat TSH level in 6 months to one year      Discussed in great detail impact of social stressors with her son  impacting her overall health  Treatment options include at home epley's, lab studies, vestibular therapy, nasal steroids, VNG testing, neurology consultation      After discussion agreed to continue Flonase (fluticasone) one spray each nostril twice day, consider PT for balance evaluation, Neck and balance therapy, Possible VNG testing depending on results all tests  Agreed to home exercises as this may be only option for time constraints       Follow up in 8 weeks if needed      Bilateral tinnitus  No significant findings on exam   No cerumen impaction, no serous fluid  Tinnitus most bothersome during the night  Reviewed nature of tinnitus and possible causes including medications, viral illness, stress, inner ear disorder, TMJ syndrome, or hearing changes  Discussed options for bilateral tinnitus including adding background noise, tinnitus retraining therapy, masking device, Resound tinnitus edgar  Consider Flonase daily for eustachian tube dysfunction  May use dental guard for TMJ, consider PT for TMJ  No specific medications or surgery indicated for treatment of tinnitus               Diagnoses and all orders for this visit:    Vertigo    Imbalance    Neck pain    Intractable migraine without aura and without status migrainosus    Bilateral tinnitus          Subjective:      Patient ID: Starla Leo is a 55 y o  female  Presents today as a follow up due to dizziness, imbalance, and tinnitus  Dizziness over past 8 or more months  Sensation off balance  Flutter sensation in ear and then gets dizzy  PT evaluation indicating no BPPV  Hearing well with ringing in ears  Occurs couple times per day  No pain in ears  No headaches associated with dizziness or tinnitus  Recent neurology evaluation   Recent MRI brain with IAC  Pt reported increased stress at home due to concerns for son's behavioral changes, developmental delays  The following portions of the patient's history were reviewed and updated as appropriate: allergies, current medications, past family history, past medical history, past social history, past surgical history and problem list     Review of Systems   Constitutional: Negative  HENT: Positive for tinnitus  Negative for congestion, ear discharge, ear pain, hearing loss, nosebleeds, postnasal drip, rhinorrhea, sinus pressure, sinus pain, sore throat and voice change  Eyes: Negative  Respiratory: Negative for chest tightness and shortness of breath  Cardiovascular: Negative  Gastrointestinal: Negative  Endocrine: Negative  Musculoskeletal: Negative  Skin: Negative for color change  Neurological: Positive for dizziness  Negative for numbness and headaches  Psychiatric/Behavioral: Negative  Objective:      Temp 98 4 °F (36 9 °C) (Temporal)   Ht 5' 7" (1 702 m)   Wt 73 5 kg (162 lb)   BMI 25 37 kg/m²          Physical Exam  Constitutional:       Appearance: She is well-developed  HENT:      Head: Normocephalic  Right Ear: Hearing, tympanic membrane, ear canal and external ear normal  No decreased hearing noted  No drainage or tenderness  Tympanic membrane is not perforated or erythematous        Left Ear: Hearing, tympanic membrane, ear canal and external ear normal  No decreased hearing noted  No drainage or tenderness  Tympanic membrane is not perforated or erythematous  Nose: Nose normal  No nasal deformity or septal deviation  Mouth/Throat:      Mouth: Mucous membranes are not pale and not dry  No oral lesions  Dentition: Normal dentition  Pharynx: Uvula midline  No oropharyngeal exudate  Neck:      Trachea: No tracheal deviation  Cardiovascular:      Rate and Rhythm: Normal rate  Pulmonary:      Effort: Pulmonary effort is normal  No accessory muscle usage or respiratory distress  Musculoskeletal:      Right shoulder: Normal range of motion  Cervical back: Full passive range of motion without pain, normal range of motion and neck supple  Lymphadenopathy:      Cervical: No cervical adenopathy  Skin:     General: Skin is warm and dry  Neurological:      Mental Status: She is alert and oriented to person, place, and time  Cranial Nerves: No cranial nerve deficit  Sensory: No sensory deficit  Psychiatric:         Behavior: Behavior is cooperative

## 2022-04-13 ENCOUNTER — TELEPHONE (OUTPATIENT)
Dept: NEUROLOGY | Facility: CLINIC | Age: 47
End: 2022-04-13

## 2022-04-13 NOTE — TELEPHONE ENCOUNTER
Contacted the patient to reschedule the appointment on 04/28/22, the patient needs a appointment on Tues, Wed, or Thurs, around 11am

## 2022-04-20 ENCOUNTER — TELEPHONE (OUTPATIENT)
Dept: NEUROLOGY | Facility: CLINIC | Age: 47
End: 2022-04-20

## 2022-04-28 ENCOUNTER — OFFICE VISIT (OUTPATIENT)
Dept: NEUROLOGY | Facility: CLINIC | Age: 47
End: 2022-04-28
Payer: COMMERCIAL

## 2022-04-28 VITALS
WEIGHT: 163 LBS | TEMPERATURE: 96.5 F | BODY MASS INDEX: 25.58 KG/M2 | HEIGHT: 67 IN | SYSTOLIC BLOOD PRESSURE: 97 MMHG | HEART RATE: 59 BPM | DIASTOLIC BLOOD PRESSURE: 66 MMHG

## 2022-04-28 DIAGNOSIS — R42 DIZZINESS AND GIDDINESS: Primary | ICD-10-CM

## 2022-04-28 DIAGNOSIS — M32.9 SLE (SYSTEMIC LUPUS ERYTHEMATOSUS) (HCC): ICD-10-CM

## 2022-04-28 PROCEDURE — 1036F TOBACCO NON-USER: CPT | Performed by: PSYCHIATRY & NEUROLOGY

## 2022-04-28 PROCEDURE — 99215 OFFICE O/P EST HI 40 MIN: CPT | Performed by: PSYCHIATRY & NEUROLOGY

## 2022-04-28 PROCEDURE — 3008F BODY MASS INDEX DOCD: CPT | Performed by: PSYCHIATRY & NEUROLOGY

## 2022-04-28 RX ORDER — MECLIZINE HYDROCHLORIDE 25 MG/1
25 TABLET ORAL EVERY 12 HOURS PRN
Qty: 60 TABLET | Refills: 0 | Status: SHIPPED | OUTPATIENT
Start: 2022-04-28

## 2022-04-28 RX ORDER — MIDODRINE HYDROCHLORIDE 2.5 MG/1
2.5 TABLET ORAL 3 TIMES DAILY
Qty: 90 TABLET | Refills: 2 | Status: SHIPPED | OUTPATIENT
Start: 2022-04-28 | End: 2022-07-14

## 2022-04-28 NOTE — PROGRESS NOTES
Return NeuroOutpatient Note        Yfn Del Angel  [id number]  66 y o   1975       Dizziness        History obtained from: * Patient     HPI/Subjective:    Yfn Del Angel is a 54 yo F that presents as f/u for dizziness  Patient had reported dizziness, lightheadedness  Today she says that it's not daily  Patient went to PT but didn't think it had helped  She was referred for hearing test and it showed b/l borderline hearing impairment  her tilt table was negative  Her MRI brain IAC protocol was normal     She has h/o Lupus and is wondering where her sxs are related to it  She was dx in 2017  She's on plaquenil and methotrexate for SLE       She has long h/o migraines  Patient reports now roughly 12 headaches a month compared to 5 at last visit       Patient is soon having MRI LS spine  She's on methotrexate for Lupus and without she gets pain  Past Medical History:   Diagnosis Date    Acquired ankle/foot deformity     last assessed: 8/29/2017    Anxiety     Chronic pain disorder     right foot plantar    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    Dysfunctional uterine bleeding     Last assessed: 1/9/2018    Headache     Lupus ([address] )     Mass of knee     last assessed: 5/7/2014    Mild acid reflux     Paronychia of finger     last assessed: 11/20/2013    Paronychia of toe     last asssessed: 9/28/2016   right foot    Patellofemoral dysfunction     last assessed: 3/21/2014    PCOS (polycystic ovarian syndrome) 12/15/2007    Pes planus, congenital     last assessed: 8/29/2017    Plantar fasciitis of right foot     Plantar fibromatosis     last assessed: 9/28/2017    PONV (postoperative nausea and vomiting)     Trichomoniasis     last assessed: 1/16/2015    Vaginal candidiasis     last assessed: 11/30/2016    Varicose veins of lower extremity     last assessed: 3/5/2014    Vertigo     last assessed: 3/5/2014    Viral gastroenteritis     last assessed: 6/4/2015     Social History Socioeconomic History    Marital status: Single     Spouse name: Not on file    Number of children: Not on file    Years of education: Not on file    Highest education level: Not on file   Occupational History    Not on file   Tobacco Use    Smoking status: Former Smoker     Packs/day: 0 50     Years: 20 00     Pack years: 10 00     Quit date:      Years since quittin 3    Smokeless tobacco: Never Used   Vaping Use    Vaping Use: Never used   Substance and Sexual Activity    Alcohol use: Yes     Comment: occ    Drug use: No    Sexual activity: Not on file   Other Topics Concern    Not on file   Social History Narrative    Daily cola consumption    Daily Tea Consumption     Social Determinants of Health     Financial Resource Strain: Not on file   Food Insecurity: Not on file   Transportation Needs: Not on file   Physical Activity: Not on file   Stress: Not on file   Social Connections: Not on file   Intimate Partner Violence: Not on file   Housing Stability: Not on file     Family History   Problem Relation Age of Onset    Cancer Mother         skin , gallbladder    Liver disease Mother     Hypertension Father     Heart disease Sister     Cancer Brother         skin    No Known Problems Daughter     No Known Problems Son     No Known Problems Son     Colon cancer Maternal Uncle     Arthritis Family     Lung cancer Family     Uterine cancer Family     No Known Problems Maternal Aunt     No Known Problems Paternal Aunt     No Known Problems Paternal Uncle     No Known Problems Maternal Grandmother     No Known Problems Maternal Grandfather     No Known Problems Paternal Grandmother     No Known Problems Paternal Grandfather     ADD / ADHD Neg Hx     Anesthesia problems Neg Hx     Clotting disorder Neg Hx     Collagen disease Neg Hx     Diabetes Neg Hx     Dislocations Neg Hx     Learning disabilities Neg Hx     Neurological problems Neg Hx     Osteoporosis Neg Hx  Rheumatologic disease Neg Hx     Scoliosis Neg Hx     Vascular Disease Neg Hx      No Known Allergies  Current Outpatient Medications on File Prior to Visit   Medication Sig Dispense Refill    albuterol (PROAIR HFA) 90 mcg/act inhaler Inhale 2 puffs every 4 (four) hours as needed for wheezing 8 5 g 0    benzonatate (TESSALON) 200 MG capsule Take 1 capsule (200 mg total) by mouth 3 (three) times a day as needed for cough 20 capsule 0    Biotin 10 MG TABS Take by mouth      brompheniramine-pseudoephedrine-DM 30-2-10 MG/5ML syrup Take 5 mL by mouth 4 (four) times a day as needed for allergies 240 mL 0    cholecalciferol (VITAMIN D3) 1,000 units tablet Take 1,000 Units by mouth daily 5000 unit daily       diazepam (VALIUM) 5 mg tablet Take 1 tablet orally 60 minutes prior to appointment  May repeat on arrival as necessary   fluticasone (FLONASE) 50 mcg/act nasal spray USE TWO SPRAYS IN TO EACH NOSTRIL DAILY (Patient taking differently: as needed  ) 16 g 2    fluticasone (FLOVENT HFA) 110 MCG/ACT inhaler Inhale 1 puff 2 (two) times a day Rinse mouth after use   (Patient taking differently: Inhale 1 puff 2 (two) times a day as needed Rinse mouth after use  ) 1 Inhaler 0    folic acid (FOLVITE) 1 mg tablet daily        gabapentin (NEURONTIN) 600 MG tablet Take 1 tablet (600 mg total) by mouth 2 (two) times a day for 30 days (Patient taking differently: Take 600 mg by mouth daily at bedtime  ) 60 tablet 0    hydroxychloroquine (PLAQUENIL) 200 mg tablet Take 200 mg by mouth 2 (two) times a day  0    ibuprofen (MOTRIN) 600 mg tablet Take 1 tablet (600 mg total) by mouth every 8 (eight) hours as needed for mild pain 30 tablet 0    methotrexate 2 5 mg tablet Take 10 mg by mouth once a week      naproxen (NAPROSYN) 500 mg tablet take 1 tablet by mouth twice a day with MEALS (Patient taking differently: as needed  ) 60 tablet 0    Omega-3 Fatty Acids (FISH OIL) 1,000 mg Take 1,000 mg by mouth daily      omeprazole (PriLOSEC) 20 mg delayed release capsule take 1 capsule by mouth once daily (Patient taking differently: daily as needed  ) 30 capsule 0    sodium chloride (ALTAMIST SPRAY) 0 65 % nasal spray 1 spray into each nostril        SUMAtriptan (IMITREX) 100 mg tablet TAKE 1 TABLET BY MOUTH ONCE AS NEEDED FOR MIGRAINE TAKE WITHIN 30 MINUTES OF MIGRAINE AS DIRECTED 9 tablet 0    traMADol (ULTRAM) 50 mg tablet Take 50 mg by mouth every 8 (eight) hours as needed      cyclobenzaprine (FLEXERIL) 5 mg tablet Take 2 tablets (10 mg total) by mouth 3 (three) times a day as needed for muscle spasms (Patient not taking: Reported on 3/7/2022 ) 30 tablet 0     No current facility-administered medications on file prior to visit  Review of Systems   Refer to positive review of systems in HPI     Review of Systems    Constitutional- No fever  Eyes- No visual change  ENT- Hearing normal  CV- No chest pain  Resp- No Shortness of breath  GI- No diarrhea  - Bladder normal  MS- No Arthritis   Skin- No rash  Psych- No depression  Endo- No DM  Heme- No nodes    Vitals:    04/28/22 1122   BP: 97/66   BP Location: Left arm   Patient Position: Sitting   Cuff Size: Standard   Pulse: 59   Temp: (!) 96 5 °F (35 8 °C)   TempSrc: Tympanic   Weight: 73 9 kg (163 lb)   Height: 5' 7" (1 702 m)       PHYSICAL EXAM:  Appearance: No Acute Distress  Ophthalmoscopic: Disc Flat, Normal fundus  Mental status:  Orientation: Awake, Alert, and Orientedx3  Memory: Registation 3/3 Recall 3/3  Attention: normal  Knowledge: good  Language: No aphasia  Speech: No dysarthria  Cranial Nerves:  2 No Visual Defect on Confrontation, Pupils round, equal, reactive to light  3,4,6 Extraocular Movements Intact, no nystagmus  5 Facial Sensation Intact  7 No facial asymmetry  8 Intact hearing  9,10 Palate symmetric, normal gag  11 Good shoulder shrug  12 Tongue Midline  Gait: Stable  Coordination: No ataxia with finger to nose testing, and heel to shin  Sensory: Intact, Symmetric to pinprick, light touch, vibration, and joint position  Muscle Tone: Normal              Muscle exam:  Arm Right Left Leg Right Left   Deltoid 5/5 5/5 Iliopsoas 5/5 5/5   Biceps 5/5 5/5 Quads 5/5 5/5   Triceps 5/5 5/5 Hamstrings 5/5 5/5   Wrist Extension 5/5 5/5 Ankle Dorsi Flexion 5/5 5/5   Wrist Flexion 5/5 5/5 Ankle Plantar Flexion 5/5 5/5   Interossei 5/5 5/5 Ankle Eversion 5/5 5/5   APB 5/5 5/5 Ankle Inversion 5/5 5/5       Reflexes   RJ BJ TJ KJ AJ Plantars Aptino's   Right 2+ 2+ 2+ 2+ 2+ Downgoing Not present   Left 2+ 2+ 2+ 2+ 2+ Downgoing Not present     Personal review of  Labs:                  Diagnoses and all orders for this visit:        1  Dizziness and giddiness  midodrine (PROAMATINE) 2 5 mg tablet    meclizine (ANTIVERT) 25 mg tablet   2  SLE (systemic lupus erythematosus) ([facility] [address] )           All of our work up ordered thus far has been negative  Patient is still experiencing dizziness which affects her daily functioning  For symptomatic relief, will try low dose midodrine to see if she can function better  PT was planning for vestibular rehab but patient doesn't have  for her kids and can't take time off                 Total time of encounter:  40 min  More than 50% of the time was used in counseling and/or coordination of care  Extent of counseling and/or coordination of care        [facility]  [address] [place] [address]  [place] [address]  [phone]

## 2022-06-09 ENCOUNTER — OFFICE VISIT (OUTPATIENT)
Dept: FAMILY MEDICINE CLINIC | Facility: CLINIC | Age: 47
End: 2022-06-09
Payer: COMMERCIAL

## 2022-06-09 VITALS
OXYGEN SATURATION: 99 % | HEIGHT: 67 IN | HEART RATE: 70 BPM | TEMPERATURE: 98.1 F | BODY MASS INDEX: 24.99 KG/M2 | RESPIRATION RATE: 18 BRPM | SYSTOLIC BLOOD PRESSURE: 98 MMHG | DIASTOLIC BLOOD PRESSURE: 72 MMHG | WEIGHT: 159.2 LBS

## 2022-06-09 DIAGNOSIS — G43.009 MIGRAINE WITHOUT AURA AND WITHOUT STATUS MIGRAINOSUS, NOT INTRACTABLE: ICD-10-CM

## 2022-06-09 DIAGNOSIS — R19.09 LUMP IN THE GROIN: Primary | ICD-10-CM

## 2022-06-09 PROCEDURE — 3725F SCREEN DEPRESSION PERFORMED: CPT | Performed by: NURSE PRACTITIONER

## 2022-06-09 PROCEDURE — 1036F TOBACCO NON-USER: CPT | Performed by: NURSE PRACTITIONER

## 2022-06-09 PROCEDURE — 99213 OFFICE O/P EST LOW 20 MIN: CPT | Performed by: NURSE PRACTITIONER

## 2022-06-09 PROCEDURE — 3008F BODY MASS INDEX DOCD: CPT | Performed by: NURSE PRACTITIONER

## 2022-06-09 RX ORDER — SUMATRIPTAN 100 MG/1
100 TABLET, FILM COATED ORAL ONCE AS NEEDED
Qty: 9 TABLET | Refills: 1 | Status: SHIPPED | OUTPATIENT
Start: 2022-06-09 | End: 2022-07-14

## 2022-06-09 NOTE — PROGRESS NOTES
Assessment/Plan:    1  Lump in the groin  -     US inguinal area; Future; Expected date: 06/09/2022    2  Migraine without aura and without status migrainosus, not intractable  -     SUMAtriptan (IMITREX) 100 mg tablet; Take 1 tablet (100 mg total) by mouth once as needed for migraine for up to 1 dose Take when experiencing migraine        treatment of lump to be determined by ultrasound result  Need to identify   Query node vs hidradenitis  Will call  Warm compress in meantime  Pt requesting refill of imitrex    Advised she is due for PE    There are no Patient Instructions on file for this visit  No follow-ups on file  Subjective:      Patient ID: Guillaume Dent is a 55 y o  female  Chief Complaint   Patient presents with    Mass     Pt c/o lump in groin area       Pt presents for eval of inguinal lump  Discovered >2 weeks ago  Painful  Not red, draining  Denies fever, weight loss, exp to std      The following portions of the patient's history were reviewed and updated as appropriate: allergies, current medications, past family history, past medical history, past social history, past surgical history and problem list     Review of Systems   Constitutional: Negative  Genitourinary: Positive for genital sores  Neurological: Positive for headaches  Negative for dizziness           Current Outpatient Medications   Medication Sig Dispense Refill    albuterol (PROAIR HFA) 90 mcg/act inhaler Inhale 2 puffs every 4 (four) hours as needed for wheezing 8 5 g 0    Biotin 10 MG TABS Take by mouth      brompheniramine-pseudoephedrine-DM 30-2-10 MG/5ML syrup Take 5 mL by mouth 4 (four) times a day as needed for allergies 240 mL 0    cholecalciferol (VITAMIN D3) 1,000 units tablet Take 1,000 Units by mouth daily 5000 unit daily       fluticasone (FLONASE) 50 mcg/act nasal spray USE TWO SPRAYS IN TO EACH NOSTRIL DAILY (Patient taking differently: as needed) 16 g 2    fluticasone (FLOVENT HFA) 110 MCG/ACT inhaler Inhale 1 puff 2 (two) times a day Rinse mouth after use  (Patient taking differently: Inhale 1 puff 2 (two) times a day as needed Rinse mouth after use ) 1 Inhaler 0    folic acid (FOLVITE) 1 mg tablet daily        hydroxychloroquine (PLAQUENIL) 200 mg tablet Take 200 mg by mouth 2 (two) times a day  0    ibuprofen (MOTRIN) 600 mg tablet Take 1 tablet (600 mg total) by mouth every 8 (eight) hours as needed for mild pain 30 tablet 0    meclizine (ANTIVERT) 25 mg tablet Take 1 tablet (25 mg total) by mouth every 12 (twelve) hours as needed for dizziness or nausea 60 tablet 0    methotrexate 2 5 mg tablet Take 10 mg by mouth once a week      naproxen (NAPROSYN) 500 mg tablet take 1 tablet by mouth twice a day with MEALS (Patient taking differently: as needed) 60 tablet 0    Omega-3 Fatty Acids (FISH OIL) 1,000 mg Take 1,000 mg by mouth daily      omeprazole (PriLOSEC) 20 mg delayed release capsule take 1 capsule by mouth once daily (Patient taking differently: daily as needed) 30 capsule 0    sodium chloride (OCEAN) 0 65 % nasal spray 1 spray into each nostril        SUMAtriptan (IMITREX) 100 mg tablet Take 1 tablet (100 mg total) by mouth once as needed for migraine for up to 1 dose Take when experiencing migraine 9 tablet 1    benzonatate (TESSALON) 200 MG capsule Take 1 capsule (200 mg total) by mouth 3 (three) times a day as needed for cough (Patient not taking: Reported on 6/9/2022) 20 capsule 0    cyclobenzaprine (FLEXERIL) 5 mg tablet Take 2 tablets (10 mg total) by mouth 3 (three) times a day as needed for muscle spasms (Patient not taking: No sig reported) 30 tablet 0    diazepam (VALIUM) 5 mg tablet Take 1 tablet orally 60 minutes prior to appointment  May repeat on arrival as necessary   (Patient not taking: Reported on 6/9/2022)      gabapentin (NEURONTIN) 600 MG tablet Take 1 tablet (600 mg total) by mouth 2 (two) times a day for 30 days (Patient taking differently: Take 600 mg by mouth daily at bedtime  ) 60 tablet 0    midodrine (PROAMATINE) 2 5 mg tablet Take 1 tablet (2 5 mg total) by mouth 3 (three) times a day 90 tablet 2    traMADol (ULTRAM) 50 mg tablet Take 50 mg by mouth every 8 (eight) hours as needed       No current facility-administered medications for this visit  Objective:    BP 98/72 (BP Location: Left arm, Patient Position: Sitting, Cuff Size: Standard)   Pulse 70   Temp 98 1 °F (36 7 °C)   Resp 18   Ht 5' 7" (1 702 m)   Wt 72 2 kg (159 lb 3 2 oz)   SpO2 99%   BMI 24 93 kg/m²        Physical Exam  Vitals and nursing note reviewed  Constitutional:       General: She is not in acute distress  Appearance: Normal appearance  She is not ill-appearing  Genitourinary:      Neurological:      Mental Status: She is alert     Psychiatric:         Mood and Affect: Mood normal                 [facility] [facility]

## 2022-06-11 ENCOUNTER — HOSPITAL ENCOUNTER (OUTPATIENT)
Dept: RADIOLOGY | Facility: HOSPITAL | Age: 47
Discharge: HOME | End: 2022-06-11
Payer: COMMERCIAL

## 2022-06-11 DIAGNOSIS — R19.09 LUMP IN THE GROIN: ICD-10-CM

## 2022-06-11 PROCEDURE — 76705 ECHO EXAM OF ABDOMEN: CPT

## 2022-06-13 ENCOUNTER — TELEPHONE (OUTPATIENT)
Dept: FAMILY MEDICINE CLINIC | Facility: CLINIC | Age: 47
End: 2022-06-13

## 2022-06-13 DIAGNOSIS — R59.0 INGUINAL ADENOPATHY: ICD-10-CM

## 2022-06-13 DIAGNOSIS — R93.89 ABNORMAL FINDING OF DIAGNOSTIC IMAGING: Primary | ICD-10-CM

## 2022-06-13 DIAGNOSIS — R59.9 LYMPH NODE ENLARGEMENT: ICD-10-CM

## 2022-06-14 ENCOUNTER — OFFICE VISIT (OUTPATIENT)
Dept: AUDIOLOGY | Facility: CLINIC | Age: 47
End: 2022-06-14
Payer: COMMERCIAL

## 2022-06-14 DIAGNOSIS — H90.3 SENSORY HEARING LOSS, BILATERAL: Primary | ICD-10-CM

## 2022-06-14 PROCEDURE — 92567 TYMPANOMETRY: CPT | Performed by: AUDIOLOGIST

## 2022-06-14 PROCEDURE — 92557 COMPREHENSIVE HEARING TEST: CPT | Performed by: AUDIOLOGIST

## 2022-06-14 NOTE — PROGRESS NOTES
ADULT HEARING EVALUATION - [place] [address] AUDIOLOGY      Patient Name: Brian Borrero   MRN:  [id number]   :  1975   Age: 55 y o  Gender: female   DOS: 2022     HISTORY:     Brian Borrero, a 55 y o  female, was seen on 2022 at the referral of Dr Ajay Preston for an audiometric evaluation  Ms Freddie Figueroa was accompanied by her daughter to today's visit    Ms Freddie Figueroa was last seen in our clinic on 22 for an audiometric evaluation, which revealed normal to mild sensorineural hearing loss (SNHL) for the right ear, and normal to mild sensorineural hearing loss (SNHL) for the left ear  The reason for her intitial audiometric consultation was due to dizziness that occurs several times per week  Today, Ms Piper's primary concern was to get recommended hearing test done  She does not notice any changes in her hearing  Ms Freddie Figueroa denied otalgia and otorrhea  History of otitis was negative  Ms Freddie Figueroa has no history of ear surgeries  History of noise exposure is negative  Other documented medical history states that Ms Freddie Figueroa  has a past medical history of Acquired ankle/foot deformity, Anxiety, Chronic pain disorder, Dermatomycosis (2008), Dysfunctional uterine bleeding, Headache, Lupus ([address] ), Mass of knee, Mild acid reflux, Paronychia of finger, Paronychia of toe, Patellofemoral dysfunction, PCOS (polycystic ovarian syndrome) (12/15/2007), Pes planus, congenital, Plantar fasciitis of right foot, Plantar fibromatosis, PONV (postoperative nausea and vomiting), Trichomoniasis, Vaginal candidiasis, Varicose veins of lower extremity, Vertigo, and Viral gastroenteritis         RESULTS:    Otoscopic Evaluation:   Right Ear: Unremarkable, canal clear   Left Ear: Unremarkable, canal clear    Tympanometry:   Right Ear: Type A; normal middle ear pressure and static compliance    Left Ear: Type A; normal middle ear pressure and static compliance     Audiometry:  Conventional pure tone audiometry from 250 - 8000 Hz  obtained with good reliability and revealed the following:     Right Ear: mild sensorineural hearing loss (SNHL)   Left Ear: mild sensorineural hearing loss (SNHL)     Distortion Product Otoacoustic Emissions (DPOAEs)   Right Ear: DNT    Left Ear: DNT    Speech Audiometry:    Speech Reception (SRT)   Right Ear: 25 dB HL   Left Ear: 25 dB HL    Word Recognition Scores (WRS):  Right Ear: excellent (100 % correct)     Left Ear: excellent (100 % correct)   Stimuli: [id number]    IMPRESSIONS:  [facility] has a mild sensorineural hearing loss in both ears  A slight change is noted when compared to her previous audiologic evaluation done on 1/19/22  The results of today's findings were reviewed with Ms Aj Gan and her hearing thresholds were explained at length  Ms Aj Gan voiced understanding of her test results and had no further questions  RECOMMENDATIONS:    1 ) Follow-up with ENT due to continued symptoms and slight change in hearing   2 ) Return for audiologic re-evaluation in 6 months to monitor hearing  *see attached audiogram*    It was a pleasure working with Ms Aj Gan today  Thank you for referring this patient       Karrie Moncada , CCC-A  Clinical Audiologist    [id number] [address] [id number]

## 2022-06-15 DIAGNOSIS — R59.0 INGUINAL LYMPHADENOPATHY: Primary | ICD-10-CM

## 2022-06-15 NOTE — TELEPHONE ENCOUNTER
Reviewed ultrasound with pt  Reviewed diag options for hypervascular lymph node of left inguinal region  She opts for biopsy  Biopsy ordered   Treatment plan to be determined by result

## 2022-06-17 DIAGNOSIS — J32.9 SINUSITIS, UNSPECIFIED CHRONICITY, UNSPECIFIED LOCATION: ICD-10-CM

## 2022-06-17 DIAGNOSIS — J40 BRONCHITIS: ICD-10-CM

## 2022-06-17 RX ORDER — ALBUTEROL SULFATE 90 UG/1
AEROSOL, METERED RESPIRATORY (INHALATION)
Qty: 18 G | Refills: 4 | Status: SHIPPED | OUTPATIENT
Start: 2022-06-17

## 2022-06-27 ENCOUNTER — HOSPITAL ENCOUNTER (OUTPATIENT)
Dept: NON INVASIVE DIAGNOSTICS | Facility: HOSPITAL | Age: 47
Discharge: HOME | End: 2022-06-27
Attending: RADIOLOGY
Payer: COMMERCIAL

## 2022-06-27 VITALS
HEART RATE: 62 BPM | SYSTOLIC BLOOD PRESSURE: 128 MMHG | DIASTOLIC BLOOD PRESSURE: 71 MMHG | OXYGEN SATURATION: 100 % | RESPIRATION RATE: 16 BRPM

## 2022-06-27 DIAGNOSIS — R59.0 INGUINAL LYMPHADENOPATHY: ICD-10-CM

## 2022-06-27 PROCEDURE — 76882 US LMTD JT/FCL EVL NVASC XTR: CPT | Performed by: RADIOLOGY

## 2022-07-11 ENCOUNTER — TELEPHONE (OUTPATIENT)
Dept: OBGYN CLINIC | Facility: OTHER | Age: 47
End: 2022-07-11

## 2022-07-11 NOTE — TELEPHONE ENCOUNTER
Patient called in to obtain information on Rheum in [place]   She was seeing a rheumatology doctor but they are transitioning out of [address] [place] [address] and due to insurance she needs to stay in [address] [place] [address]       C b # [phone]

## 2022-07-14 DIAGNOSIS — R42 DIZZINESS AND GIDDINESS: ICD-10-CM

## 2022-07-14 RX ORDER — MIDODRINE HYDROCHLORIDE 2.5 MG/1
TABLET ORAL
Qty: 90 TABLET | Refills: 1 | Status: SHIPPED | OUTPATIENT
Start: 2022-07-14

## 2022-10-11 ENCOUNTER — TELEPHONE (OUTPATIENT)
Dept: NEUROLOGY | Facility: CLINIC | Age: 47
End: 2022-10-11

## 2022-10-11 NOTE — TELEPHONE ENCOUNTER
Contacted the patient to confirm the appointment on 10/18/22, the patient requested the appointment be canceled stating that she will call back to reschedule  Appointment canceled

## 2022-10-16 NOTE — PROGRESS NOTES
Assessment and Plan:       Plan:  Diagnoses and all orders for this visit:    Systemic lupus erythematosus, unspecified SLE type, unspecified organ involvement status ([facility] [address] )    Long-term use of Plaquenil    Methotrexate, long term, current use    Osteoarthritis of cervical spine, unspecified spinal osteoarthritis complication status    Osteoarthritis of lumbar spine, unspecified spinal osteoarthritis complication status    Fibromyalgia    Plantar fasciitis of right foot    Need for hepatitis C screening test      I have personally reviewed pertinent films in PACS of the right foot XR which shows a calcaneal spur  Activities as tolerated  Exercise: try to maintain a low impact exercise regimen as much as possible  Walk for 30 minutes a day for at least 3 days a week  Continue other medications as prescribed by PCP and other specialists  RTC in       HPI    History of Present Illness: Dior Rosales is a 55 y o  female who presents for follow up for SLE, fibromyalgia  She intentionally lost 30 pounds  She describes her pains as achy, stiff burning, occasionally sharp    Labs revealed + GLENNA, dsDNA, most recent set was normal  She is currently on Plaquenil and now MTX  She has some nausea  She is following with Dr Pk Horton for her low back pain and did a few sessions of PT  She is taking Tramadol as needed for pain  She awaiting to get another GRANT    She also states she has dizziness, vertigo  She has been seeing neurology,ENT-work up unremarkable  She has ongoing fatigue  Previous history-She had blood work in 2/2018- vitamin D level low 22, RF,ESR were normal  She was advised by her PCP to take OTC vitamin D but she had been taking it sporadically since February 2018  She has completed Vitamin D 50,000 units weekly and repeat 25 OH vitamin D normalized to 40  She states that Ibuprofen 600 mg did not help her pain  She is now on Naproxen  She states that inactivity, weather changes aggravate it  She has R plantar fasciitis and sees a podiatrist  She has had steroid injections with minimal relief  She also reports ongoing fatigue  She does have a lot of stress at this time  She also states she was told she does snore  She has not had a chance to get sleep study  She denies any history of uveitis, scleritis, psoriasis, rectal bleeding,neck or low back pains  She has twins that are [de-identified] years old and a three year old child      Labs reveal + GLENNA, +dsDNA but most recent dsDNA was normal        Assessment/Plan: Patrice Humphries is a 55 y o  female here for follow up for SLE, Fibromyalgia, low back pain, neck pain, dizziness    Arthralgias-moderate  + GLENNA, dsDNA-most recent was normal  I feel that her symptoms are due to SLE, R shoulder pain most likely due to cervical radiculopathy  Recommend xray of R shoulder,, cervical spine for further evaluation  continue Plaquenil visual field testing done in May 2020  continue Methotrexate 8 tabs weekly and folic acid to 2 mg daily due to some nausea-consider switching to leucovorin  The risks,benefits and side effects were discussed with the patient  Patient aware that it is teratogenic  She had tubal ligation  continue Omega 3 supplements  we discussed lifestyle modifications  labs prior to next visit     Fibromyalgia-  off Cymbalta due to side effects  Neurontin 600 mg qhs on a regular basis  Naproxen prn with food   The risks,benefits and side effects were discussed with the patient       low back pain-chronic  Most likely has component of lumbar radiculopathy  Following with Dr Evelio Yap  S/p PT  Getting GRANT  On Tramadol    Dizziness-  Following with neurology but work up was unremarkable  Prescribed Meclizine     Fatigue-  I feel that her symptoms are related to Fibromyalgia  recommend sleep study      The following portions of the patient's history were reviewed and updated as appropriate: allergies, current medications, past family history, past medical history, past social history, past surgical history and problem list       Review of Systems  Constitutional: Negative for weight change, fevers, chills, night sweats, fatigue  ENT/Mouth: Negative for hearing changes, ear pain, nasal congestion, sinus pain, hoarseness, sore throat, rhinorrhea, swallowing difficulty  Eyes: Negative for pain, redness, discharge, vision changes  Cardiovascular: Negative for chest pain, SOB, palpitations  Respiratory: Negative for cough, sputum, wheezing, dyspnea  Gastrointestinal: Negative for nausea, vomiting, diarrhea, constipation, pain, heartburn  Genitourinary: Negative for dysuria, urinary frequency, hematuria  Musculoskeletal: As per HPI  Skin: Negative for skin rash, color changes  Neuro: Negative for weakness, numbness, tingling, loss of consciousness  Psych: Negative for anxiety, depression  Heme/Lymph: Negative for easy bruising, bleeding, lymphadenopathy  Past Medical History:   Diagnosis Date   • Acquired ankle/foot deformity     last assessed: 8/29/2017   • Anxiety    • Chronic pain disorder     right foot plantar   • 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   • Dysfunctional uterine bleeding     Last assessed: 1/9/2018   • Headache    • Lupus ([facility] [address] )    • Mass of knee     last assessed: 5/7/2014   • Mild acid reflux    • Paronychia of finger     last assessed: 11/20/2013   • Paronychia of toe     last asssessed: 9/28/2016   right foot   • Patellofemoral dysfunction     last assessed: 3/21/2014   • PCOS (polycystic ovarian syndrome) 12/15/2007   • Pes planus, congenital     last assessed: 8/29/2017   • Plantar fasciitis of right foot    • Plantar fibromatosis     last assessed: 9/28/2017   • PONV (postoperative nausea and vomiting)    • Trichomoniasis     last assessed: 1/16/2015   • Vaginal candidiasis     last assessed: 11/30/2016   • Varicose veins of lower extremity     last assessed: 3/5/2014   • Vertigo     last assessed: 3/5/2014   • Viral gastroenteritis     last assessed: 2015       Past Surgical History:   Procedure Laterality Date   • BACK SURGERY      discectomy lumbar   •  SECTION      x2 bikini   • PILONIDAL CYST EXCISION     • RI HYSTEROSCOPY,W/ENDO BX N/A 10/18/2017    Procedure: HYSTEROSCOPY AND FRACTIONAL DILATATION AND CURRETTAGE;  Surgeon: Matilde Jones MD;  Location: [facility] MAIN OR;  Service: Gynecology   • TUBAL LIGATION  2016   • WISDOM TOOTH EXTRACTION         Social History     Socioeconomic History   • Marital status: Single     Spouse name: Not on file   • Number of children: Not on file   • Years of education: Not on file   • Highest education level: Not on file   Occupational History   • Not on file   Tobacco Use   • Smoking status: Former Smoker     Packs/day: 0 50     Years: 20 00     Pack years: 10 00     Quit date:      Years since quittin 7   • Smokeless tobacco: Never Used   Vaping Use   • Vaping Use: Never used   Substance and Sexual Activity   • Alcohol use: Yes     Comment: occ   • Drug use: No   • Sexual activity: Not on file   Other Topics Concern   • Not on file   Social History Narrative    Daily cola consumption    Daily Tea Consumption     Social Determinants of Health     Financial Resource Strain: Not on file   Food Insecurity: Not on file   Transportation Needs: Not on file   Physical Activity: Not on file   Stress: Not on file   Social Connections: Not on file   Intimate Partner Violence: Not on file   Housing Stability: Not on file       Family History   Problem Relation Age of Onset   • Cancer Mother         skin , gallbladder   • Liver disease Mother    • Hypertension Father    • Heart disease Sister    • Cancer Brother         skin   • No Known Problems Daughter    • No Known Problems Son    • No Known Problems Son    • Colon cancer Maternal Uncle    • Arthritis Family    • Lung cancer Family    • Uterine cancer Family    • No Known Problems Maternal Aunt    • No Known Problems Paternal Aunt    • No Known Problems Paternal Uncle    • No Known Problems Maternal Grandmother    • No Known Problems Maternal Grandfather    • No Known Problems Paternal Grandmother    • No Known Problems Paternal Grandfather    • ADD / ADHD Neg Hx    • Anesthesia problems Neg Hx    • Clotting disorder Neg Hx    • Collagen disease Neg Hx    • Diabetes Neg Hx    • Dislocations Neg Hx    • Learning disabilities Neg Hx    • Neurological problems Neg Hx    • Osteoporosis Neg Hx    • Rheumatologic disease Neg Hx    • Scoliosis Neg Hx    • Vascular Disease Neg Hx        No Known Allergies      Current Outpatient Medications:   •  albuterol (PROVENTIL HFA,VENTOLIN HFA) 90 mcg/act inhaler, INHALE TWO PUFFS EVERY 6 (SIX) HOURS AS NEEDED FOR WHEEZING, Disp: 18 g, Rfl: 4  •  benzonatate (TESSALON) 200 MG capsule, Take 1 capsule (200 mg total) by mouth 3 (three) times a day as needed for cough (Patient not taking: Reported on 6/9/2022), Disp: 20 capsule, Rfl: 0  •  Biotin 10 MG TABS, Take by mouth, Disp: , Rfl:   •  brompheniramine-pseudoephedrine-DM 30-2-10 MG/5ML syrup, Take 5 mL by mouth 4 (four) times a day as needed for allergies, Disp: 240 mL, Rfl: 0  •  cholecalciferol (VITAMIN D3) 1,000 units tablet, Take 1,000 Units by mouth daily 5000 unit daily , Disp: , Rfl:   •  cyclobenzaprine (FLEXERIL) 5 mg tablet, Take 2 tablets (10 mg total) by mouth 3 (three) times a day as needed for muscle spasms (Patient not taking: No sig reported), Disp: 30 tablet, Rfl: 0  •  diazepam (VALIUM) 5 mg tablet, Take 1 tablet orally 60 minutes prior to appointment  May repeat on arrival as necessary  (Patient not taking: Reported on 6/9/2022), Disp: , Rfl:   •  fluticasone (FLONASE) 50 mcg/act nasal spray, USE TWO SPRAYS IN TO EACH NOSTRIL DAILY (Patient taking differently: as needed), Disp: 16 g, Rfl: 2  •  fluticasone (FLOVENT HFA) 110 MCG/ACT inhaler, Inhale 1 puff 2 (two) times a day Rinse mouth after use   (Patient taking differently: Inhale 1 puff 2 (two) times a day as needed Rinse mouth after use ), Disp: 1 Inhaler, Rfl: 0  •  folic acid (FOLVITE) 1 mg tablet, daily  , Disp: , Rfl:   •  gabapentin (NEURONTIN) 600 MG tablet, Take 1 tablet (600 mg total) by mouth 2 (two) times a day for 30 days (Patient taking differently: Take 600 mg by mouth daily at bedtime  ), Disp: 60 tablet, Rfl: 0  •  hydroxychloroquine (PLAQUENIL) 200 mg tablet, Take 200 mg by mouth 2 (two) times a day, Disp: , Rfl: 0  •  ibuprofen (MOTRIN) 600 mg tablet, Take 1 tablet (600 mg total) by mouth every 8 (eight) hours as needed for mild pain, Disp: 30 tablet, Rfl: 0  •  meclizine (ANTIVERT) 25 mg tablet, Take 1 tablet (25 mg total) by mouth every 12 (twelve) hours as needed for dizziness or nausea, Disp: 60 tablet, Rfl: 0  •  methotrexate 2 5 mg tablet, Take 10 mg by mouth once a week, Disp: , Rfl:   •  midodrine (PROAMATINE) 2 5 mg tablet, TAKE 1 TABLET (2 5 MG TOTAL) BY MOUTH THREE (THREE) TIMES A DAY, Disp: 90 tablet, Rfl: 1  •  naproxen (NAPROSYN) 500 mg tablet, take 1 tablet by mouth twice a day with MEALS (Patient taking differently: as needed), Disp: 60 tablet, Rfl: 0  •  Omega-3 Fatty Acids (FISH OIL) 1,000 mg, Take 1,000 mg by mouth daily, Disp: , Rfl:   •  omeprazole (PriLOSEC) 20 mg delayed release capsule, take 1 capsule by mouth once daily (Patient taking differently: daily as needed), Disp: 30 capsule, Rfl: 0  •  sodium chloride (OCEAN) 0 65 % nasal spray, 1 spray into each nostril  , Disp: , Rfl:   •  SUMAtriptan (IMITREX) 100 mg tablet, TAKE 1 TABLET (100 MG TOTAL) BY MOUTH ONCE AS NEEDED FOR MIGRAINE FOR UP TO 1 DOSE TAKE WHEN EXPERI*, Disp: 9 tablet, Rfl: 0  •  traMADol (ULTRAM) 50 mg tablet, Take 50 mg by mouth every 8 (eight) hours as needed, Disp: , Rfl:       Objective: There were no vitals filed for this visit  Physical Exam  General: Well appearing, well nourished, in no distress  Oriented x 3, normal mood and affect  Ambulating without difficulty    Skin: Good turgor, no rash, unusual bruising or prominent lesions  Hair: Normal texture and distribution  Nails: Normal color, no deformities  HEENT:  Head: Normocephalic, atraumatic  Eyes: Conjunctiva clear, sclera non-icteric, EOM intact  Extremities: No amputations or deformities, cyanosis, edema  Musculoskeletal:   Neurologic: Alert and oriented  No focal neurological deficits appreciated  Psychiatric: Normal mood and affect  GIAN Reyna    Rheumatology

## 2022-10-17 ENCOUNTER — TELEMEDICINE (OUTPATIENT)
Dept: RHEUMATOLOGY | Facility: CLINIC | Age: 47
End: 2022-10-17
Payer: COMMERCIAL

## 2022-10-17 DIAGNOSIS — Z79.631 METHOTREXATE, LONG TERM, CURRENT USE: ICD-10-CM

## 2022-10-17 DIAGNOSIS — M47.816 OSTEOARTHRITIS OF LUMBAR SPINE, UNSPECIFIED SPINAL OSTEOARTHRITIS COMPLICATION STATUS: ICD-10-CM

## 2022-10-17 DIAGNOSIS — Z79.899 LONG-TERM USE OF PLAQUENIL: ICD-10-CM

## 2022-10-17 DIAGNOSIS — Z11.59 NEED FOR HEPATITIS C SCREENING TEST: ICD-10-CM

## 2022-10-17 DIAGNOSIS — M32.9 SYSTEMIC LUPUS ERYTHEMATOSUS, UNSPECIFIED SLE TYPE, UNSPECIFIED ORGAN INVOLVEMENT STATUS (HCC): Primary | ICD-10-CM

## 2022-10-17 DIAGNOSIS — M47.812 OSTEOARTHRITIS OF CERVICAL SPINE, UNSPECIFIED SPINAL OSTEOARTHRITIS COMPLICATION STATUS: ICD-10-CM

## 2022-10-17 DIAGNOSIS — M79.7 FIBROMYALGIA: ICD-10-CM

## 2022-10-17 DIAGNOSIS — M72.2 PLANTAR FASCIITIS OF RIGHT FOOT: ICD-10-CM

## 2022-10-17 PROCEDURE — 99205 OFFICE O/P NEW HI 60 MIN: CPT | Performed by: INTERNAL MEDICINE

## 2022-10-17 RX ORDER — GABAPENTIN 100 MG/1
100 CAPSULE ORAL DAILY
Qty: 30 CAPSULE | Refills: 0 | Status: SHIPPED | OUTPATIENT
Start: 2022-10-17

## 2022-10-17 NOTE — PROGRESS NOTES
Virtual Regular Visit    Verification of patient location:    Patient is located in the following state in which I hold an active license [place]      Assessment/Plan:    Problem List Items Addressed This Visit    None     Visit Diagnoses     Systemic lupus erythematosus, unspecified SLE type, unspecified organ involvement status ([facility] [address] )    -  Primary    Relevant Orders    CBC and differential    Comprehensive metabolic panel    C-reactive protein    Sedimentation rate, automated    C4 complement    C3 complement    Anti-DNA antibody, double-stranded    Urinalysis with microscopic    Protein / creatinine ratio, urine    Anti-Sofi 1 Antibody    Anti-scleroderma antibody    Centromere Antibody    Nuclear antigen antibody    Sjogren's Antibodies    Beta-2 glycoprotein antibodies    Cardiolipin antibody    Lupus anticoagulant    Rheumatoid Arthritis Profile    Ferritin    Thyroid Antibodies Panel    HLA-B27 antigen    Long-term use of Plaquenil        Methotrexate, long term, current use        Osteoarthritis of cervical spine, unspecified spinal osteoarthritis complication status        Relevant Orders    HLA-B27 antigen    Osteoarthritis of lumbar spine, unspecified spinal osteoarthritis complication status        Relevant Orders    HLA-B27 antigen    Fibromyalgia        Relevant Medications    gabapentin (Neurontin) 100 mg capsule    Plantar fasciitis of right foot        Relevant Orders    HLA-B27 antigen    Need for hepatitis C screening test        Relevant Orders    Hepatitis B surface antigen    Hepatitis C antibody               Reason for visit is new patient  Chief Complaint   Patient presents with   • Virtual Regular Visit        Encounter provider So Perez MD    Provider located at [address] [place] [id number] [phone]      Recent Visits  No visits were found meeting these conditions    Showing recent visits within past 7 days and meeting all other requirements  Today's Visits  Date Type Provider Dept   10/17/22 Telemedicine Diana Ferguson MD [facility] today's visits and meeting all other requirements  Future Appointments  No visits were found meeting these conditions  Showing future appointments within next 150 days and meeting all other requirements       The patient was identified by name and date of birth  Darlene Barajas was informed that this is a telemedicine visit and that the visit is being conducted through Telephone  My office door was closed  No one else was in the room  She acknowledged consent and understanding of privacy and security of the video platform  The patient has agreed to participate and understands they can discontinue the visit at any time  Patient is aware this is a billable service  Subjective    HPI     Ms Jame Tellez a 49-year-old female with history significant for systemic lupus erythematosus and fibromyalgia who presents to establish with [facility]  She is currently on hydroxychloroquine 200 mg twice daily, methotrexate 20 mg once weekly with folic acid 1 mg daily and gabapentin 600 mg at bedtime  She is transferring care from Dr Remy Guerrero  She is self-referred today  I am unable to access her initial labs but based on her prior rheumatology records she was diagnosed with systemic lupus erythematosus in 2017 when she presented with a positive GLENNA, double-stranded DNA antibody as well as diffuse arthralgias  Based on labs that I am able to access dating back to 2020 she has presented with a normal GLENNA, double-stranded DNA antibody, inflammatory markers and complements  She mentions at the time of her diagnosis she was started on hydroxychloroquine 200 mg twice daily which she has been on since then and follows regularly with Ophthalmology    She did not feel like the hydroxychloroquine really impacted her symptoms and a few months after her diagnosis methotrexate was added on currently at a dose of 20 mg once weekly  She reports that this does help to some degree  She also has a diagnosis of fibromyalgia given the ongoing pain and eventually duloxetine was added but as she developed side effects to this it was discontinued and currently she is on gabapentin 600 mg nightly which helps with the body pain and her sleep  She was initially prescribed 600 mg twice daily but stopped the morning dose as it was causing excessive drowsiness  She takes an occasional over-the-counter Advil as needed which does help her  She reports that she still experiences pain in her hands and knees without any joint swelling  She describes morning stiffness that takes a few minutes to improve as well as gelling phenomenon  She reports previously she was experiencing significant hair loss but this has improved since starting biotin  She does feel warm at times but has not checked her temperature  Intermittently she will notice a redness appearance on her neck but denies other skin rashes  At times she has noticed a sun sensitive skin rash arise on her legs  She thinks that she may manifest with nose and mouth sores but has never visualized this  She reports a history of 1 episode of colitis many years ago  She does not experience classic Raynaud's symptoms with color changes on cold exposure but reports that with slight cold exposure she will notice icy feet  She denies fevers, unintentional weight loss, ongoing alopecia, dry eyes, dry mouth, inflammatory eye disease, psoriasis, swollen glands, pleuritic chest pain, inflammatory bowel disease, blood clots, miscarriages or a family history of autoimmune disease  I reviewed her most recent labs from August 2022 which showed an unremarkable CBC, CMP, ESR, CRP, C3, C4, double-stranded DNA antibody, GLENNA screen and urinalysis        Past Medical History:   Diagnosis Date   • Acquired ankle/foot deformity     last assessed: 2017   • Anxiety    • Chronic pain disorder     right foot plantar   • Dermatomycosis 2008   • Dysfunctional uterine bleeding     Last assessed: 2018   • Headache    • Lupus ([address] )    • Mass of knee     last assessed: 2014   • Mild acid reflux    • Paronychia of finger     last assessed: 2013   • Paronychia of toe     last asssessed: 2016   right foot   • Patellofemoral dysfunction     last assessed: 3/21/2014   • PCOS (polycystic ovarian syndrome) 12/15/2007   • Pes planus, congenital     last assessed: 2017   • Plantar fasciitis of right foot    • Plantar fibromatosis     last assessed: 2017   • PONV (postoperative nausea and vomiting)    • Trichomoniasis     last assessed: 2015   • Vaginal candidiasis     last assessed: 2016   • Varicose veins of lower extremity     last assessed: 3/5/2014   • Vertigo     last assessed: 3/5/2014   • Viral gastroenteritis     last assessed: 2015       Past Surgical History:   Procedure Laterality Date   • BACK SURGERY      discectomy lumbar   •  SECTION      x2 bikini   • PILONIDAL CYST EXCISION     • WA HYSTEROSCOPY,W/ENDO BX N/A 10/18/2017    Procedure: HYSTEROSCOPY AND FRACTIONAL DILATATION AND CURRETTAGE;  Surgeon: Janes Alexander MD;  Location: [facility] MAIN OR;  Service: Gynecology   • TUBAL LIGATION     • WISDOM TOOTH EXTRACTION         Current Outpatient Medications   Medication Sig Dispense Refill   • gabapentin (Neurontin) 100 mg capsule Take 1 capsule (100 mg total) by mouth daily 30 capsule 0   • albuterol (PROVENTIL HFA,VENTOLIN HFA) 90 mcg/act inhaler INHALE TWO PUFFS EVERY 6 (SIX) HOURS AS NEEDED FOR WHEEZING 18 g 4   • benzonatate (TESSALON) 200 MG capsule Take 1 capsule (200 mg total) by mouth 3 (three) times a day as needed for cough (Patient not taking: Reported on 2022) 20 capsule 0   • Biotin 10 MG TABS Take by mouth     • brompheniramine-pseudoephedrine-DM 30-2-10 MG/5ML syrup Take 5 mL by mouth 4 (four) times a day as needed for allergies 240 mL 0   • cholecalciferol (VITAMIN D3) 1,000 units tablet Take 1,000 Units by mouth daily 5000 unit daily      • cyclobenzaprine (FLEXERIL) 5 mg tablet Take 2 tablets (10 mg total) by mouth 3 (three) times a day as needed for muscle spasms (Patient not taking: No sig reported) 30 tablet 0   • diazepam (VALIUM) 5 mg tablet Take 1 tablet orally 60 minutes prior to appointment  May repeat on arrival as necessary  (Patient not taking: Reported on 6/9/2022)     • fluticasone (FLONASE) 50 mcg/act nasal spray USE TWO SPRAYS IN TO EACH NOSTRIL DAILY (Patient taking differently: as needed) 16 g 2   • fluticasone (FLOVENT HFA) 110 MCG/ACT inhaler Inhale 1 puff 2 (two) times a day Rinse mouth after use   (Patient taking differently: Inhale 1 puff 2 (two) times a day as needed Rinse mouth after use ) 1 Inhaler 0   • folic acid (FOLVITE) 1 mg tablet daily       • gabapentin (NEURONTIN) 600 MG tablet Take 1 tablet (600 mg total) by mouth 2 (two) times a day for 30 days (Patient taking differently: Take 600 mg by mouth daily at bedtime  ) 60 tablet 0   • hydroxychloroquine (PLAQUENIL) 200 mg tablet Take 200 mg by mouth 2 (two) times a day  0   • ibuprofen (MOTRIN) 600 mg tablet Take 1 tablet (600 mg total) by mouth every 8 (eight) hours as needed for mild pain 30 tablet 0   • meclizine (ANTIVERT) 25 mg tablet Take 1 tablet (25 mg total) by mouth every 12 (twelve) hours as needed for dizziness or nausea 60 tablet 0   • methotrexate 2 5 mg tablet Take 10 mg by mouth once a week     • midodrine (PROAMATINE) 2 5 mg tablet TAKE 1 TABLET (2 5 MG TOTAL) BY MOUTH THREE (THREE) TIMES A DAY 90 tablet 1   • naproxen (NAPROSYN) 500 mg tablet take 1 tablet by mouth twice a day with MEALS (Patient taking differently: as needed) 60 tablet 0   • Omega-3 Fatty Acids (FISH OIL) 1,000 mg Take 1,000 mg by mouth daily     • omeprazole (PriLOSEC) 20 mg delayed release capsule take 1 capsule by mouth once daily (Patient taking differently: daily as needed) 30 capsule 0   • sodium chloride (OCEAN) 0 65 % nasal spray 1 spray into each nostril       • SUMAtriptan (IMITREX) 100 mg tablet TAKE 1 TABLET (100 MG TOTAL) BY MOUTH ONCE AS NEEDED FOR MIGRAINE FOR UP TO 1 DOSE TAKE WHEN EXPERI* 9 tablet 0   • traMADol (ULTRAM) 50 mg tablet Take 50 mg by mouth every 8 (eight) hours as needed       No current facility-administered medications for this visit  No Known Allergies      Review of Systems  Constitutional: Negative for weight change, fevers, chills, night sweats  Positive for fatigue  ENT/Mouth: Negative for hearing changes, ear pain, nasal congestion, sinus pain, hoarseness, sore throat, rhinorrhea, swallowing difficulty  Eyes: Negative for pain, redness, discharge, vision changes  Cardiovascular: Negative for chest pain, SOB, palpitations  Respiratory: Negative for cough, sputum, wheezing, dyspnea  Gastrointestinal: Negative for nausea, vomiting, diarrhea, constipation, pain, heartburn  Genitourinary: Negative for dysuria, urinary frequency, hematuria  Musculoskeletal: As per HPI  Skin: Negative for skin rash, color changes  Neuro: Negative for weakness, numbness, tingling, loss of consciousness  Psych: Negative for anxiety, depression  Heme/Lymph: Negative for easy bruising, bleeding, lymphadenopathy  I reviewed the x-ray of her right foot images in PACS which shows a calcaneal spur  Assessment and plan:  Ms Scott Risk a 26-year-old female with history significant for systemic lupus erythematosus and fibromyalgia who presents to [facility] with [address] [place] [address] Rheumatology  She is currently on hydroxychloroquine 200 mg twice daily, methotrexate 20 mg once weekly with folic acid 1 mg daily and gabapentin 600 mg at bedtime  She is transferring care from Dr Val Cancino  She is self-referred today        Balaji Andersen presents today to [facility] for management of systemic lupus erythematosus and fibromyalgia that was diagnosed in 2017  Since then she has been on hydroxychloroquine at 200 mg twice daily and methotrexate 20 mg once weekly which does help to some degree with her overall pain  She also seems to be symptomatic due to the fibromyalgia diagnosis and does report good relief with gabapentin 600 mg nightly but was unable to tolerate the morning dose due to excessive drowsiness  In view of this I will add on a low dose of gabapentin at 100 mg once daily in the morning to see if this provides her with additional benefit of the joint pains and titrate the dose as needed  Based on the serologies I suspect less likely that her ongoing pain is related to the lupus and she will continue the hydroxychloroquine and methotrexate unchanged  I requested she follow up with Ophthalmology for annual eye exams and she will update her methotrexate lab monitoring and lupus related lab monitoring in December  - In regards to the spinal degenerative arthritis and if she is continuing to experience chronic back pain I requested she establish with spine and pain management  I spent 25 minutes directly with the patient during this visit

## 2022-11-30 DIAGNOSIS — M79.7 FIBROMYALGIA: ICD-10-CM

## 2022-11-30 RX ORDER — GABAPENTIN 100 MG/1
100 CAPSULE ORAL DAILY
Qty: 30 CAPSULE | Refills: 0 | Status: SHIPPED | OUTPATIENT
Start: 2022-11-30

## 2022-12-02 ENCOUNTER — TELEPHONE (OUTPATIENT)
Dept: OBGYN CLINIC | Facility: CLINIC | Age: 47
End: 2022-12-02

## 2022-12-02 NOTE — TELEPHONE ENCOUNTER
Pt called the office and said she has lupus and her hair is thinning/ falling out  She is taking biotin daily and would like to know if she can double up on it/ take anything else to help  Pt is unsure what dose she is currently taking  Please advise     C/b # [phone]

## 2022-12-19 ENCOUNTER — TELEPHONE (OUTPATIENT)
Dept: OBGYN CLINIC | Facility: HOSPITAL | Age: 47
End: 2022-12-19

## 2022-12-19 DIAGNOSIS — M32.9 SYSTEMIC LUPUS ERYTHEMATOSUS, UNSPECIFIED SLE TYPE, UNSPECIFIED ORGAN INVOLVEMENT STATUS (HCC): Primary | ICD-10-CM

## 2022-12-19 NOTE — TELEPHONE ENCOUNTER
Pt contacted Call Center requested refill of their medication  Medication Name: Methotrexate       Dosage of Med:       Frequency of Med:        Remaining Medication: 0      Pharmacy and Location: [place] [address]        Pt  Preferred Callback Phone Number: [phone]      Thank you

## 2022-12-20 ENCOUNTER — OFFICE VISIT (OUTPATIENT)
Dept: AUDIOLOGY | Facility: CLINIC | Age: 47
End: 2022-12-20

## 2022-12-20 ENCOUNTER — TELEPHONE (OUTPATIENT)
Dept: FAMILY MEDICINE CLINIC | Facility: CLINIC | Age: 47
End: 2022-12-20

## 2022-12-20 DIAGNOSIS — H90.3 SENSORINEURAL HEARING LOSS (SNHL), BILATERAL: Primary | ICD-10-CM

## 2022-12-20 DIAGNOSIS — H93.13 BILATERAL TINNITUS: Primary | ICD-10-CM

## 2022-12-20 NOTE — TELEPHONE ENCOUNTER
Patient is currently having a routine hearing test and Kristi Lane requires an order placed in her chart

## 2022-12-20 NOTE — PROGRESS NOTES
ADULT HEARING EVALUATION - [place] [address] AUDIOLOGY      Patient Name: Jamie Piper   MRN:  [id number]   :  1975   Age: 52 y o  Gender: female   DOS: 2022     HISTORY:     Jamie Piper, a 52 y o  female, was seen on 2022 at the referral of Dr Barak Armstrong for an audiometric evaluation  Ms Hosea Campos was accompanied by her daughter to today's visit    Ms Hosea Campos was last seen in our clinic on 22 for an audiometric evaluation, which revealed normal to mild sensorineural hearing loss (SNHL) for the right ear, and normal to mild sensorineural hearing loss (SNHL) for the left ear  Today, Ms Piper's primary concern was to get recommended hearing test done  She does not notice any changes in her hearing  Ms Hosea Campos denied otalgia and otorrhea  History of otitis was negative  Ms Hosea Campos has no history of ear surgeries  History of noise exposure is negative  Other documented medical history states that Ms Hosea Campos  has a past medical history of Acquired ankle/foot deformity, Anxiety, Chronic pain disorder, Dermatomycosis (2008), Dysfunctional uterine bleeding, Headache, Lupus ([facility] [address] ), Mass of knee, Mild acid reflux, Paronychia of finger, Paronychia of toe, Patellofemoral dysfunction, PCOS (polycystic ovarian syndrome) (12/15/2007), Pes planus, congenital, Plantar fasciitis of right foot, Plantar fibromatosis, PONV (postoperative nausea and vomiting), Trichomoniasis, Vaginal candidiasis, Varicose veins of lower extremity, Vertigo, and Viral gastroenteritis         RESULTS:    Otoscopic Evaluation:   Right Ear: Unremarkable, canal clear   Left Ear: Unremarkable, canal clear    Tympanometry:   Right Ear: Type A; normal middle ear pressure and static compliance    Left Ear: Type A; normal middle ear pressure and static compliance     Audiometry:  Conventional pure tone audiometry from 250 - 8000 Hz  obtained with good reliability and revealed the following:     Right Ear: mild sensorineural hearing loss (SNHL)   Left Ear: mild sensorineural hearing loss (SNHL)       Speech Audiometry:    Speech Reception (SRT)   Right Ear: 15 dB HL   Left Ear: 20 dB HL    Word Recognition Scores (WRS):  Right Ear: excellent (100 % correct)     Left Ear: excellent (100 % correct)   Stimuli: [id number]    IMPRESSIONS:  Mild SNHL AU    The results of today's findings were reviewed with Ms Shannon Judd and her hearing thresholds were explained at length  Ms Shannon Judd voiced understanding of her test results and had no further questions  RECOMMENDATIONS:    1 ) Based on today's findings and patient symptoms, Ms Shannon Judd is a candidate for a trial with amplification  A hearing aid evaluation to further discuss Ms Piper's lifestyle, communication needs, and develop a treatment plan for their hearing loss is recommended  2 ) Annual audiograms, sooner if problems/concerns arise  *see attached audiogram*    It was a pleasure working with Ms Shannon Judd today  Thank you for referring this patient       Susana Millerr, CCC-A  Clinical Audiologist    [address] [place] [phone]

## 2022-12-29 ENCOUNTER — OFFICE VISIT (OUTPATIENT)
Dept: FAMILY MEDICINE CLINIC | Facility: CLINIC | Age: 47
End: 2022-12-29

## 2022-12-29 VITALS
RESPIRATION RATE: 14 BRPM | BODY MASS INDEX: 24.48 KG/M2 | WEIGHT: 156 LBS | HEIGHT: 67 IN | SYSTOLIC BLOOD PRESSURE: 120 MMHG | HEART RATE: 81 BPM | DIASTOLIC BLOOD PRESSURE: 80 MMHG | OXYGEN SATURATION: 100 % | TEMPERATURE: 98.1 F

## 2022-12-29 DIAGNOSIS — J02.9 SORE THROAT: ICD-10-CM

## 2022-12-29 DIAGNOSIS — B37.2 YEAST DERMATITIS: Primary | ICD-10-CM

## 2022-12-29 DIAGNOSIS — M32.9 LUPUS (HCC): ICD-10-CM

## 2022-12-29 RX ORDER — PREDNISONE 10 MG/1
10 TABLET ORAL DAILY
Qty: 5 TABLET | Refills: 0 | Status: SHIPPED | OUTPATIENT
Start: 2022-12-29 | End: 2023-01-03

## 2022-12-29 RX ORDER — FLUCONAZOLE 150 MG/1
150 TABLET ORAL ONCE
Qty: 1 TABLET | Refills: 0 | Status: SHIPPED | OUTPATIENT
Start: 2022-12-29 | End: 2022-12-29

## 2022-12-29 RX ORDER — AMOXICILLIN AND CLAVULANATE POTASSIUM 875; 125 MG/1; MG/1
1 TABLET, FILM COATED ORAL EVERY 12 HOURS SCHEDULED
Qty: 14 TABLET | Refills: 0 | Status: SHIPPED | OUTPATIENT
Start: 2022-12-29 | End: 2023-01-05

## 2022-12-29 RX ORDER — NYSTATIN 100000 U/G
CREAM TOPICAL 2 TIMES DAILY
Qty: 30 G | Refills: 0 | Status: SHIPPED | OUTPATIENT
Start: 2022-12-29

## 2022-12-29 NOTE — PROGRESS NOTES
Jaja Adame 1975 female MRN: [id number]    [facility] OFFICE VISIT  [facility] - [address]      ASSESSMENT/PLAN  Jaja Adame is a 52 y o  female presents to the office for    Diagnoses and all orders for this visit:    Yeast dermatitis  -     nystatin (MYCOSTATIN) cream; Apply topically 2 (two) times a day  -     fluconazole (DIFLUCAN) 150 mg tablet; Take 1 tablet (150 mg total) by mouth once for 1 dose    Sore throat  -     amoxicillin-clavulanate (AUGMENTIN) 875-125 mg per tablet; Take 1 tablet by mouth every 12 (twelve) hours for 7 days    Lupus (HCC)  -     predniSONE 10 mg tablet; Take 1 tablet (10 mg total) by mouth daily for 5 days        yeast dermatitis started on nystatin cream twice a day for 7 days please take 1 tablet of Diflucan   sore throat likely influenza a verses strep started on Augmentin if viral panel comes back negative please continue Augmentin for the full course   given history of  Lupus started on prednisone to avoid flare        return to our office if no improvement  Future Appointments   Date Time Provider Yanique Mar   1/4/2023  9:00 AM Karrie Montgomery  [facility]   2/16/2023 10:30 AM Yoly Redd MD [facility]          SUBJECTIVE  CC: Cough (Earaches, body aches, headache, COVID negative)      HPI:  Jaja Adame is a 52 y o  female who presents for  Acute appointment  2 days ago, headache, body aches, sore throat, congestion  3am looking for a drink because of how sore her throat was  Patient states her boyfriend currently has itchy redness on his penis  She gets itchy only right before her menses and would like to have a cream for yeast just in case  She does have a history of lupus going to be reestablishing with a rheumatologist here in [address]  Around periods she gets headaches ahead of time  Review of Systems   Constitutional: Positive for fatigue     HENT: Positive for congestion and sore throat  Respiratory: Positive for cough (jsut started)  Gastrointestinal: Positive for constipation  Musculoskeletal: Positive for myalgias  Neurological: Positive for headaches  Historical Information   The patient history was reviewed as follows:  Past Medical History:   Diagnosis Date   • Acquired ankle/foot deformity     last assessed: 8/29/2017   • Anxiety    • Chronic pain disorder     right foot plantar   • 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   • Dysfunctional uterine bleeding     Last assessed: 1/9/2018   • Headache    • Lupus ([address] )    • Mass of knee     last assessed: 5/7/2014   • Mild acid reflux    • Paronychia of finger     last assessed: 11/20/2013   • Paronychia of toe     last asssessed: 9/28/2016   right foot   • Patellofemoral dysfunction     last assessed: 3/21/2014   • PCOS (polycystic ovarian syndrome) 12/15/2007   • Pes planus, congenital     last assessed: 8/29/2017   • Plantar fasciitis of right foot    • Plantar fibromatosis     last assessed: 9/28/2017   • PONV (postoperative nausea and vomiting)    • Trichomoniasis     last assessed: 1/16/2015   • Vaginal candidiasis     last assessed: 11/30/2016   • Varicose veins of lower extremity     last assessed: 3/5/2014   • Vertigo     last assessed: 3/5/2014   • Viral gastroenteritis     last assessed: 6/4/2015         Medications:     Current Outpatient Medications:   •  albuterol (PROVENTIL HFA,VENTOLIN HFA) 90 mcg/act inhaler, INHALE TWO PUFFS EVERY 6 (SIX) HOURS AS NEEDED FOR WHEEZING, Disp: 18 g, Rfl: 4  •  amoxicillin-clavulanate (AUGMENTIN) 875-125 mg per tablet, Take 1 tablet by mouth every 12 (twelve) hours for 7 days, Disp: 14 tablet, Rfl: 0  •  Biotin 10 MG TABS, Take by mouth, Disp: , Rfl:   •  cholecalciferol (VITAMIN D3) 1,000 units tablet, Take 1,000 Units by mouth daily 5000 unit daily , Disp: , Rfl:   •  fluconazole (DIFLUCAN) 150 mg tablet, Take 1 tablet (150 mg total) by mouth once for 1 dose, Disp: 1 tablet, Rfl: 0  •  fluticasone (FLONASE) 50 mcg/act nasal spray, USE TWO SPRAYS IN TO EACH NOSTRIL DAILY (Patient taking differently: as needed), Disp: 16 g, Rfl: 2  •  fluticasone (FLOVENT HFA) 110 MCG/ACT inhaler, Inhale 1 puff 2 (two) times a day Rinse mouth after use  (Patient taking differently: Inhale 1 puff 2 (two) times a day as needed Rinse mouth after use ), Disp: 1 Inhaler, Rfl: 0  •  gabapentin (NEURONTIN) 100 mg capsule, TAKE 1 CAPSULE (100 MG TOTAL) BY MOUTH DAILY, Disp: 30 capsule, Rfl: 0  •  gabapentin (NEURONTIN) 600 MG tablet, Take 1 tablet (600 mg total) by mouth 2 (two) times a day for 30 days (Patient taking differently: Take 600 mg by mouth daily at bedtime), Disp: 60 tablet, Rfl: 0  •  hydroxychloroquine (PLAQUENIL) 200 mg tablet, Take 200 mg by mouth 2 (two) times a day, Disp: , Rfl: 0  •  ibuprofen (MOTRIN) 600 mg tablet, Take 1 tablet (600 mg total) by mouth every 8 (eight) hours as needed for mild pain, Disp: 30 tablet, Rfl: 0  •  meclizine (ANTIVERT) 25 mg tablet, Take 1 tablet (25 mg total) by mouth every 12 (twelve) hours as needed for dizziness or nausea, Disp: 60 tablet, Rfl: 0  •  methotrexate 2 5 mg tablet, Take 8 tabs once weekly  , Disp: 96 tablet, Rfl: 0  •  midodrine (PROAMATINE) 2 5 mg tablet, TAKE 1 TABLET (2 5 MG TOTAL) BY MOUTH THREE (THREE) TIMES A DAY, Disp: 90 tablet, Rfl: 1  •  naproxen (NAPROSYN) 500 mg tablet, take 1 tablet by mouth twice a day with MEALS (Patient taking differently: as needed), Disp: 60 tablet, Rfl: 0  •  nystatin (MYCOSTATIN) cream, Apply topically 2 (two) times a day, Disp: 30 g, Rfl: 0  •  Omega-3 Fatty Acids (FISH OIL) 1,000 mg, Take 1,000 mg by mouth daily, Disp: , Rfl:   •  omeprazole (PriLOSEC) 20 mg delayed release capsule, take 1 capsule by mouth once daily (Patient taking differently: daily as needed), Disp: 30 capsule, Rfl: 0  •  predniSONE 10 mg tablet, Take 1 tablet (10 mg total) by mouth daily for 5 days, Disp: 5 tablet, Rfl: 0  • SUMAtriptan (IMITREX) 100 mg tablet, TAKE 1 TABLET (100 MG TOTAL) BY MOUTH ONCE AS NEEDED FOR MIGRAINE FOR UP TO 1 DOSE TAKE WHEN EXPERI*, Disp: 9 tablet, Rfl: 0  •  benzonatate (TESSALON) 200 MG capsule, Take 1 capsule (200 mg total) by mouth 3 (three) times a day as needed for cough (Patient not taking: Reported on 6/9/2022), Disp: 20 capsule, Rfl: 0  •  brompheniramine-pseudoephedrine-DM 30-2-10 MG/5ML syrup, Take 5 mL by mouth 4 (four) times a day as needed for allergies (Patient not taking: Reported on 12/29/2022), Disp: 240 mL, Rfl: 0  •  cyclobenzaprine (FLEXERIL) 5 mg tablet, Take 2 tablets (10 mg total) by mouth 3 (three) times a day as needed for muscle spasms (Patient not taking: Reported on 3/7/2022), Disp: 30 tablet, Rfl: 0  •  diazepam (VALIUM) 5 mg tablet, Take 1 tablet orally 60 minutes prior to appointment  May repeat on arrival as necessary  (Patient not taking: Reported on 6/9/2022), Disp: , Rfl:   •  folic acid (FOLVITE) 1 mg tablet, daily   (Patient not taking: Reported on 12/29/2022), Disp: , Rfl:   •  sodium chloride (OCEAN) 0 65 % nasal spray, 1 spray into each nostril   (Patient not taking: Reported on 12/29/2022), Disp: , Rfl:     No Known Allergies    OBJECTIVE  Vitals:   Vitals:    12/29/22 1541   BP: 120/80   BP Location: Left arm   Patient Position: Sitting   Cuff Size: Standard   Pulse: 81   Resp: 14   Temp: 98 1 °F (36 7 °C)   SpO2: 100%   Weight: 70 8 kg (156 lb)   Height: 5' 7" (1 702 m)         Physical Exam  Vitals reviewed  Constitutional:       Appearance: She is well-developed  HENT:      Head: Normocephalic and atraumatic  Nose: Congestion present  Mouth/Throat:      Pharynx: Posterior oropharyngeal erythema present  Eyes:      Conjunctiva/sclera: Conjunctivae normal       Pupils: Pupils are equal, round, and reactive to light  Cardiovascular:      Rate and Rhythm: Normal rate and regular rhythm  Heart sounds: Normal heart sounds     Pulmonary: Effort: Pulmonary effort is normal  No respiratory distress  Breath sounds: Normal breath sounds  Musculoskeletal:         General: Normal range of motion  Cervical back: Normal range of motion and neck supple  Skin:     General: Skin is warm  Capillary Refill: Capillary refill takes less than 2 seconds  Neurological:      Mental Status: She is alert and oriented to person, place, and time                      Chloe Jain MD,   [facility]  12/29/2022

## 2022-12-30 ENCOUNTER — TELEPHONE (OUTPATIENT)
Dept: ADMINISTRATIVE | Facility: OTHER | Age: 47
End: 2022-12-30

## 2022-12-30 LAB
FLUAV RNA RESP QL NAA+PROBE: NEGATIVE
FLUBV RNA RESP QL NAA+PROBE: NEGATIVE
SARS-COV-2 RNA RESP QL NAA+PROBE: NEGATIVE

## 2022-12-30 NOTE — LETTER
Vaccination Request Form: [id number] aka SARS-CoV-2 (Katlin Silvia or Pineda Peter or J [employer] J)      Date Requested: 23  Patient: Jamie Piper  Patient : 1975   Referring Provider: Gema Tomlin MD       The above patient has informed us that they have had their   most recent COVID-19 aka SARS-CoV-2 (Katlin Vega or Edwin Saravia or [employer]) administered at your facility  Please   complete this form and attach all corresponding documentation  Date of Vaccine(s) Given  ______________________________    Lot Number(s) _______________________________________    Manufacture(s) ______________________________________    Dose Amount (s) _____________________________________    Expiration Date(s) ____________________________________    Comments __________________________________________________________  ____________________________________________________________________  ____________________________________________________________________  ____________________________________________________________________    Administering Facility  ________________________________________________    Vaccine Administered By (print name) ___________________________________      Form Completed By (print name) _______________________________________      Signature ___________________________________________________________      These reports are needed for  compliance  Please fax this completed form and a copy of the Vaccine Document(s) to our office located at [place] [address] as soon as possible to [phone] [facility] Patel Nails: Phone [phone]    We thank you for your assistance in treating our mutual patient

## 2022-12-30 NOTE — LETTER
Vaccination Request Form: [id number] aka SARS-CoV-2 (Angeli Marco or Edwin Saravia or J [employer] J)      Date Requested: 23  Patient: Rohit Bronson  Patient : 1975   Referring Provider: Johanna Ashley MD       The above patient has informed us that they have had their   most recent COVID-19 aka SARS-CoV-2 (Angeli Marco or Edwin Saravia or [employer] MAI) administered at your facility  Please   complete this form and attach all corresponding documentation  Date of Vaccine(s) Given  ______________________________    Lot Number(s) _______________________________________    Manufacture(s) ______________________________________    Dose Amount (s) _____________________________________    Expiration Date(s) ____________________________________    Comments __________________________________________________________  ____________________________________________________________________  ____________________________________________________________________  ____________________________________________________________________    Administering Facility  ________________________________________________    Vaccine Administered By (print name) ___________________________________      Form Completed By (print name) _______________________________________      Signature ___________________________________________________________      These reports are needed for  compliance  Please fax this completed form and a copy of the Vaccine Document(s) to our office located at [place] [address] as soon as possible to [phone] mejia Klein: Phone [phone]    We thank you for your assistance in treating our mutual patient

## 2023-01-03 ENCOUNTER — TELEPHONE (OUTPATIENT)
Dept: FAMILY MEDICINE CLINIC | Facility: CLINIC | Age: 48
End: 2023-01-03

## 2023-01-03 DIAGNOSIS — R05.9 COUGH: ICD-10-CM

## 2023-01-03 DIAGNOSIS — R09.81 SINUS CONGESTION: ICD-10-CM

## 2023-01-03 RX ORDER — BENZONATATE 200 MG/1
200 CAPSULE ORAL 3 TIMES DAILY PRN
Qty: 20 CAPSULE | Refills: 0 | Status: SHIPPED | OUTPATIENT
Start: 2023-01-03 | End: 2023-02-10

## 2023-01-04 ENCOUNTER — OFFICE VISIT (OUTPATIENT)
Dept: AUDIOLOGY | Facility: CLINIC | Age: 48
End: 2023-01-04

## 2023-01-04 DIAGNOSIS — H90.3 SENSORINEURAL HEARING LOSS (SNHL), BILATERAL: Primary | ICD-10-CM

## 2023-01-05 DIAGNOSIS — M79.7 FIBROMYALGIA: ICD-10-CM

## 2023-01-05 RX ORDER — GABAPENTIN 100 MG/1
100 CAPSULE ORAL DAILY
Qty: 30 CAPSULE | Refills: 0 | Status: SHIPPED | OUTPATIENT
Start: 2023-01-05

## 2023-01-05 NOTE — PROGRESS NOTES
HEARING AID EVALUTION - [facility] AUDIOLOGY    Patient Name: Teddy Evans   MRN:  [id number]   :  1975   Age: 52 y o  Gender: female   DOS: 2023     Teddy Evans was seen today for a hearing aid evaluation following her audiometric testing performed on 2022  Ms Leonardo Walker was unaccompanied to today's visit  Ms Leonardo Walker was referred by Fannie Berkowitz The audiometric evaluation on 2022 revealed normal to mild sensorineural hearing loss (SNHL) in the right ear and normal to mild sensorineural hearing loss (SNHL) in the left ear  Word recognition scores were excellent in the right ear and excellent in the left ear  This findings were reviewed with Ms Leonardo Walker  All of her questions regarding her hearing status were addressed, and the importance of realistic expectations of hearing loss and amplification were emphasized  Hearing aids are assistive devices and are not designed to restore normal hearing  The difference between peripheral hearing loss and speech understanding (central hearing loss) was explained  While improvement with amplification is possible, there will always be word understanding problems due to the inherent nature of hearing loss  These problems will be greater in background noise than in quiet situations  Ms Leonardo Walker was counseled on the importance of self-advocacy, motivation, as well as effective communication strategies that can be used to optimize hearing aid success  These effective communication strategies include:   1 ) Maintaining face-to-face communication, allowing for speechreading of facial expressions, lips, and gestures  2 ) Reducing background noise and distance between communication partners  3 ) Having communication partners reduce their rate of speech when appropriate  4 ) Beginning conversation by getting communication partner's attention and stating the topic, allowing for context clues to help fill in gaps in comprehension      5 ) Asking for rephrasing of aspects of conversation that are missed when needed rather than asking for repetitions  To better determine which communication difficulties they are looking to improved upon, an open ended discussion was held  Based on these results, Ms Piper's prioritized communication difficulties include:     1 ) difficulty hearing in background noise  2 ) embarrassment from needing to ask for speech repetitions  3 ) 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)  4 ) concern for safety of children due to hearing difficulties    Strengths and limitations of amplification, including various hearing aid styles, options, and circuitry were discussed at length with Ms Farnaz Barrett  Based on the degree of her hearing loss, preferences, and lifestyle needs, a trial with Oticon More 1 miniRITEs was recommended with size 1 60 dB gain receivers and medium open domes domes  Ms Farnaz Barrett preferred the terracotta form factor color  [facility]'s office policies regarding our hearing aid program were reviewed, including the 45 day trial period, non-refundable fees, as well as the  warranties  At this time, Ms Farnaz Barrett wished to proceed with order of the above hearing aids  Devices ordered as specified  She will return for a hearing aid fitting on when devices are received  It was a pleasure working with Ms Farnaz Barrett  They were encouraged to contact the clinic with any further questions or concerns in the meantime  Karrie Sarmiento    Clinical Audiologist    [address] [address] [id number]

## 2023-01-18 ENCOUNTER — OFFICE VISIT (OUTPATIENT)
Dept: FAMILY MEDICINE CLINIC | Facility: CLINIC | Age: 48
End: 2023-01-18

## 2023-01-18 ENCOUNTER — PREP FOR PROCEDURE (OUTPATIENT)
Dept: GASTROENTEROLOGY | Facility: CLINIC | Age: 48
End: 2023-01-18

## 2023-01-18 ENCOUNTER — TELEPHONE (OUTPATIENT)
Dept: GASTROENTEROLOGY | Facility: CLINIC | Age: 48
End: 2023-01-18

## 2023-01-18 VITALS
SYSTOLIC BLOOD PRESSURE: 110 MMHG | DIASTOLIC BLOOD PRESSURE: 70 MMHG | BODY MASS INDEX: 24.64 KG/M2 | OXYGEN SATURATION: 98 % | TEMPERATURE: 97.5 F | WEIGHT: 157 LBS | HEART RATE: 75 BPM | RESPIRATION RATE: 16 BRPM | HEIGHT: 67 IN

## 2023-01-18 DIAGNOSIS — Z12.31 ENCOUNTER FOR SCREENING MAMMOGRAM FOR BREAST CANCER: ICD-10-CM

## 2023-01-18 DIAGNOSIS — Z12.4 SCREENING FOR CERVICAL CANCER: ICD-10-CM

## 2023-01-18 DIAGNOSIS — Z13.29 SCREENING FOR THYROID DISORDER: ICD-10-CM

## 2023-01-18 DIAGNOSIS — J02.9 SORE THROAT: ICD-10-CM

## 2023-01-18 DIAGNOSIS — B37.9 YEAST INFECTION: ICD-10-CM

## 2023-01-18 DIAGNOSIS — Z12.11 SCREENING FOR COLON CANCER: Primary | ICD-10-CM

## 2023-01-18 DIAGNOSIS — R05.9 COUGH, UNSPECIFIED TYPE: ICD-10-CM

## 2023-01-18 DIAGNOSIS — L65.9 HAIR LOSS: ICD-10-CM

## 2023-01-18 DIAGNOSIS — Z12.12 ENCOUNTER FOR COLORECTAL CANCER SCREENING: ICD-10-CM

## 2023-01-18 DIAGNOSIS — Z13.220 SCREENING CHOLESTEROL LEVEL: ICD-10-CM

## 2023-01-18 DIAGNOSIS — Z12.11 ENCOUNTER FOR COLORECTAL CANCER SCREENING: ICD-10-CM

## 2023-01-18 DIAGNOSIS — Z00.00 PHYSICAL EXAM: Primary | ICD-10-CM

## 2023-01-18 LAB
SL AMB  POCT GLUCOSE, UA: NORMAL
SL AMB LEUKOCYTE ESTERASE,UA: NORMAL
SL AMB POCT BILIRUBIN,UA: NORMAL
SL AMB POCT BLOOD,UA: NORMAL
SL AMB POCT CLARITY,UA: CLEAR
SL AMB POCT COLOR,UA: YELLOW
SL AMB POCT KETONES,UA: NORMAL
SL AMB POCT NITRITE,UA: NORMAL
SL AMB POCT PH,UA: 5
SL AMB POCT SPECIFIC GRAVITY,UA: 1
SL AMB POCT URINE PROTEIN: NORMAL
SL AMB POCT UROBILINOGEN: NORMAL

## 2023-01-18 RX ORDER — AMOXICILLIN 875 MG/1
875 TABLET, COATED ORAL 2 TIMES DAILY
Qty: 14 TABLET | Refills: 0 | Status: SHIPPED | OUTPATIENT
Start: 2023-01-18 | End: 2023-01-25

## 2023-01-18 RX ORDER — FLUCONAZOLE 150 MG/1
TABLET ORAL
Qty: 2 TABLET | Refills: 0 | Status: SHIPPED | OUTPATIENT
Start: 2023-01-18 | End: 2023-01-21

## 2023-01-18 NOTE — PROGRESS NOTES
[address] [facility]    NAME: William Telles  AGE: 52 y o  SEX: female  : 1975     DATE: 2023     Assessment and Plan:     Problem List Items Addressed This Visit    None  Visit Diagnoses     Physical exam    -  Primary    Relevant Orders    Iron, TIBC and Ferritin Panel    Sore throat        Relevant Medications    amoxicillin (AMOXIL) 875 mg tablet    Encounter for screening mammogram for breast cancer        Relevant Orders    Mammo screening bilateral w 3d & cad    Encounter for colorectal cancer screening        Relevant Orders    Ambulatory referral for colonoscopy    Screening for cervical cancer        Relevant Orders    Liquid-based pap, diagnostic    Cough, unspecified type        Relevant Orders    XR chest pa & lateral    Yeast infection        Relevant Medications    fluconazole (DIFLUCAN) 150 mg tablet    Screening cholesterol level        Relevant Orders    Lipid panel    Screening for thyroid disorder        Relevant Orders    TSH, 3rd generation with Free T4 reflex    Hair loss        Relevant Orders    Iron, TIBC and Ferritin Panel      Sore throat-> Amoxcillin BID started given abnormal exam  Cough: Allergy? Recommend using albuterol and starting Allegra  CXR to be performed  All screening labs sent  Yeast medication given to the patient  Maryjo Monroy notice given  Immunizations and preventive care screenings were discussed with patient today  Appropriate education was printed on patient's after visit summary  No follow-ups on file  Chief Complaint:     Chief Complaint   Patient presents with   • Physical Exam     PAP, Hair loss Letter for Maryjo Monroy Duty       History of Present Illness:     Adult Annual Physical   Patient here for a comprehensive physical exam    Cough continues to present   Patient worsening with hair loss wants evaluation  Recently got a cat in September  Would like to be excused from jury duty given her history of lupus  Diet and Physical Activity  · Diet/Nutrition: well balanced diet  · Exercise: walking  Depression Screening  PHQ-2/9 Depression Screening    Little interest or pleasure in doing things: 0 - not at all  Feeling down, depressed, or hopeless: 0 - not at all  PHQ-2 Score: 0  PHQ-2 Interpretation: Negative depression screen       General Health  · Sleep: sleeps well  · Hearing: normal - bilateral   · Vision: no vision problems  · Dental: regular dental visits        Review of Systems:     Review of Systems   Constitutional: Negative for activity change, appetite change, chills, fatigue and fever  HENT: Negative for congestion  Respiratory: Positive for cough  Negative for chest tightness and shortness of breath  Cardiovascular: Negative for chest pain and leg swelling  Gastrointestinal: Negative for abdominal distention, abdominal pain, constipation, diarrhea, nausea and vomiting  All other systems reviewed and are negative  Past Medical History:     Past Medical History:   Diagnosis Date   • Acquired ankle/foot deformity     last assessed: 8/29/2017   • Anxiety    • Chronic pain disorder     right foot plantar   • 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   • Dysfunctional uterine bleeding     Last assessed: 1/9/2018   • Headache    • Lupus ([address] )    • Mass of knee     last assessed: 5/7/2014   • Mild acid reflux    • Paronychia of finger     last assessed: 11/20/2013   • Paronychia of toe     last asssessed: 9/28/2016   right foot   • Patellofemoral dysfunction     last assessed: 3/21/2014   • PCOS (polycystic ovarian syndrome) 12/15/2007   • Pes planus, congenital     last assessed: 8/29/2017   • Plantar fasciitis of right foot    • Plantar fibromatosis     last assessed: 9/28/2017   • PONV (postoperative nausea and vomiting)    • Trichomoniasis     last assessed: 1/16/2015   • Vaginal candidiasis     last assessed: 11/30/2016   • Varicose veins of lower extremity last assessed: 3/5/2014   • Vertigo     last assessed: 3/5/2014   • Viral gastroenteritis     last assessed: 2015      Past Surgical History:     Past Surgical History:   Procedure Laterality Date   • BACK SURGERY      discectomy lumbar   •  SECTION      x2 bikini   • PILONIDAL CYST EXCISION     • AR HYSTEROSCOPY BX ENDOMETRIUM&/POLYPC W/WO D&C N/A 10/18/2017    Procedure: HYSTEROSCOPY AND FRACTIONAL DILATATION AND CURRETTAGE;  Surgeon: Francesca Corona MD;  Location: [facility] MAIN OR;  Service: Gynecology   • TUBAL LIGATION     • WISDOM TOOTH EXTRACTION        Social History:     Social History     Socioeconomic History   • Marital status: Single     Spouse name: None   • Number of children: None   • Years of education: None   • Highest education level: None   Occupational History   • None   Tobacco Use   • Smoking status: Former     Packs/day: 0 50     Years: 20 00     Pack years: 10 00     Types: Cigarettes     Quit date:      Years since quittin 0   • Smokeless tobacco: Never   Vaping Use   • Vaping Use: Never used   Substance and Sexual Activity   • Alcohol use: Yes     Comment: occ   • Drug use: No   • Sexual activity: None   Other Topics Concern   • None   Social History Narrative    Daily cola consumption    Daily Tea Consumption     Social Determinants of Health     Financial Resource Strain: Not on file   Food Insecurity: Not on file   Transportation Needs: Not on file   Physical Activity: Not on file   Stress: Not on file   Social Connections: Not on file   Intimate Partner Violence: Not on file   Housing Stability: Not on file      Family History:     Family History   Problem Relation Age of Onset   • Cancer Mother         skin , gallbladder   • Liver disease Mother    • Hypertension Father    • Heart disease Sister    • Cancer Brother         skin   • No Known Problems Daughter    • No Known Problems Son    • No Known Problems Son    • Colon cancer Maternal Uncle    • Arthritis Family    • Lung cancer Family    • Uterine cancer Family    • No Known Problems Maternal Aunt    • No Known Problems Paternal Aunt    • No Known Problems Paternal Uncle    • No Known Problems Maternal Grandmother    • No Known Problems Maternal Grandfather    • No Known Problems Paternal Grandmother    • No Known Problems Paternal Grandfather    • ADD / ADHD Neg Hx    • Anesthesia problems Neg Hx    • Clotting disorder Neg Hx    • Collagen disease Neg Hx    • Diabetes Neg Hx    • Dislocations Neg Hx    • Learning disabilities Neg Hx    • Neurological problems Neg Hx    • Osteoporosis Neg Hx    • Rheumatologic disease Neg Hx    • Scoliosis Neg Hx    • Vascular Disease Neg Hx       Current Medications:     Current Outpatient Medications   Medication Sig Dispense Refill   • albuterol (PROVENTIL HFA,VENTOLIN HFA) 90 mcg/act inhaler INHALE TWO PUFFS EVERY 6 (SIX) HOURS AS NEEDED FOR WHEEZING 18 g 4   • amoxicillin (AMOXIL) 875 mg tablet Take 1 tablet (875 mg total) by mouth 2 (two) times a day for 7 days 14 tablet 0   • benzonatate (TESSALON) 200 MG capsule Take 1 capsule (200 mg total) by mouth 3 (three) times a day as needed for cough 20 capsule 0   • Biotin 10 MG TABS Take by mouth     • cholecalciferol (VITAMIN D3) 1,000 units tablet Take 1,000 Units by mouth daily 5000 unit daily      • fluconazole (DIFLUCAN) 150 mg tablet Take 1 tablet now and take 1 tablet in 3 days 2 tablet 0   • fluticasone (FLOVENT HFA) 110 MCG/ACT inhaler Inhale 1 puff 2 (two) times a day Rinse mouth after use   (Patient taking differently: Inhale 1 puff 2 (two) times a day as needed Rinse mouth after use ) 1 Inhaler 0   • folic acid (FOLVITE) 1 mg tablet daily     • gabapentin (NEURONTIN) 100 mg capsule Take 1 capsule (100 mg total) by mouth daily 30 capsule 0   • gabapentin (NEURONTIN) 600 MG tablet Take 1 tablet (600 mg total) by mouth 2 (two) times a day for 30 days (Patient taking differently: Take 600 mg by mouth daily at bedtime) 60 tablet 0   • hydroxychloroquine (PLAQUENIL) 200 mg tablet Take 200 mg by mouth 2 (two) times a day  0   • ibuprofen (MOTRIN) 600 mg tablet Take 1 tablet (600 mg total) by mouth every 8 (eight) hours as needed for mild pain 30 tablet 0   • meclizine (ANTIVERT) 25 mg tablet Take 1 tablet (25 mg total) by mouth every 12 (twelve) hours as needed for dizziness or nausea 60 tablet 0   • methotrexate 2 5 mg tablet Take 8 tabs once weekly  96 tablet 0   • midodrine (PROAMATINE) 2 5 mg tablet TAKE 1 TABLET (2 5 MG TOTAL) BY MOUTH THREE (THREE) TIMES A DAY 90 tablet 1   • nystatin (MYCOSTATIN) cream Apply topically 2 (two) times a day 30 g 0   • Omega-3 Fatty Acids (FISH OIL) 1,000 mg Take 1,000 mg by mouth daily     • omeprazole (PriLOSEC) 20 mg delayed release capsule take 1 capsule by mouth once daily (Patient taking differently: daily as needed) 30 capsule 0   • SUMAtriptan (IMITREX) 100 mg tablet TAKE 1 TABLET (100 MG TOTAL) BY MOUTH ONCE AS NEEDED FOR MIGRAINE FOR UP TO 1 DOSE TAKE WHEN EXPERI* 9 tablet 0   • diazepam (VALIUM) 5 mg tablet Take 1 tablet orally 60 minutes prior to appointment  May repeat on arrival as necessary  (Patient not taking: Reported on 6/9/2022)     • naproxen (NAPROSYN) 500 mg tablet take 1 tablet by mouth twice a day with MEALS (Patient taking differently: as needed) 60 tablet 0   • sodium chloride (OCEAN) 0 65 % nasal spray 1 spray into each nostril   (Patient not taking: Reported on 12/29/2022)       No current facility-administered medications for this visit  Allergies: Allergies   Allergen Reactions   • Azithromycin GI Intolerance      Physical Exam:     /70 (BP Location: Left arm, Patient Position: Sitting, Cuff Size: Standard)   Pulse 75   Temp 97 5 °F (36 4 °C)   Resp 16   Ht 5' 7" (1 702 m)   Wt 71 2 kg (157 lb)   SpO2 98%   BMI 24 59 kg/m²     Physical Exam  Vitals reviewed  Constitutional:       Appearance: Normal appearance  She is well-developed     HENT: Head: Normocephalic and atraumatic  Right Ear: Tympanic membrane, ear canal and external ear normal  There is no impacted cerumen  Left Ear: Tympanic membrane, ear canal and external ear normal  There is no impacted cerumen  Nose: Nose normal       Mouth/Throat:      Mouth: Mucous membranes are moist       Pharynx: Oropharynx is clear  Eyes:      Conjunctiva/sclera: Conjunctivae normal       Pupils: Pupils are equal, round, and reactive to light  Cardiovascular:      Rate and Rhythm: Normal rate and regular rhythm  Heart sounds: Normal heart sounds  Pulmonary:      Effort: Pulmonary effort is normal       Breath sounds: Normal breath sounds  Abdominal:      General: Abdomen is flat  Bowel sounds are normal       Palpations: Abdomen is soft  Genitourinary:     Vagina: Normal       Cervix: Discharge (white discharge) present  Uterus: Normal     Musculoskeletal:         General: Normal range of motion  Cervical back: Normal range of motion and neck supple  Skin:     General: Skin is warm  Capillary Refill: Capillary refill takes less than 2 seconds  Neurological:      General: No focal deficit present  Mental Status: She is alert and oriented to person, place, and time  Mental status is at baseline  Psychiatric:         Mood and Affect: Mood normal          Behavior: Behavior normal          Thought Content:  Thought content normal          Judgment: Judgment normal           Manuel Leong MD  [address]

## 2023-01-18 NOTE — LETTER
Date: 7/03/2116    This is to certify that Ariadna Price has been under my care for the following diagnosis: Chronic back pain with radiculopathy, Lupus   Unable to sit for prolong periods greater than one hour at a time             I feel that she is unable to serve on Jury Duty at this time for the above mentioned medical reasons                       Sincerely,  Barber Aparicio MD

## 2023-01-19 ENCOUNTER — TELEPHONE (OUTPATIENT)
Dept: ADMINISTRATIVE | Facility: OTHER | Age: 48
End: 2023-01-19

## 2023-01-19 NOTE — TELEPHONE ENCOUNTER
----- Message from Jenny Smart sent at 12/29/2022  3:47 PM EST -----  Regarding: COVID  12/29/22 3:47 PM    Hello, our patient attached above has had Immunization(s) completed/performed  Please assist in updating the patient chart by making an External outreach to [facility] located in [place]  The date of service is [id number]      Thank you,  Jenny Dolan FP
As a final attempt, a third outreach has been made via telephone call to facility  Please see Contacts section for details  This encounter will be closed and completed by end of day  Should we receive the requested information because of previous outreach attempts, the requested patient's chart will be updated appropriately       Thank you  Rhonda Jeronimo
As a follow-up, a second attempt has been made for outreach via fax to facility  Please see Contacts section for details      Thank you  Juan Atkinson
Upon review of the In Basket request and the patient's chart, initial outreach has been made via fax to facility  Please see Contacts section for details       Thank you  Winifred Ledesma
Upon review of the In Basket request we were able to locate, review, and update the patient chart as requested for Immunization(s) Covid vaccines  Any additional questions or concerns should be emailed to the Practice Liaisons via the appropriate education email address, please do not reply via In Basket      Thank you  Juan Atkinson
health record/patient

## 2023-01-19 NOTE — TELEPHONE ENCOUNTER
Upon review of the In Basket request we have found this is a duplicate request and no further action is needed  This request is currently being processed and documentation is being completed in the initial request  This message will now be closed  Any additional questions or concerns should be emailed to the Practice Liaisons via the appropriate education email address, please do not reply via In Basket      Thank you  Sherri Jara

## 2023-01-19 NOTE — TELEPHONE ENCOUNTER
----- Message from Bebeto Lancaster sent at 1/18/2023  9:10 AM EST -----  Regarding: COVID  01/18/23 9:10 AM    Hello, our patient attached above has had Immunization(s) completed/performed  Please assist in updating the patient chart by making an External outreach to [facility] located in [place]  The date of service is 2021      Thank you,  Bebeto [facility]

## 2023-01-21 LAB
CYTOLOGIST CVX/VAG CYTO: NORMAL
DX ICD CODE: NORMAL
HPV I/H RISK 4 DNA CVX QL PROBE+SIG AMP: NEGATIVE
OTHER STN SPEC: NORMAL
PATH REPORT.FINAL DX SPEC: NORMAL
SL AMB NOTE:: NORMAL
SL AMB SPECIMEN ADEQUACY: NORMAL
SL AMB TEST METHODOLOGY: NORMAL

## 2023-02-03 LAB
CHOLEST SERPL-MCNC: 150 MG/DL (ref 100–199)
CHOLEST/HDLC SERPL: 1.7 RATIO (ref 0–4.4)
HDLC SERPL-MCNC: 86 MG/DL
LDLC SERPL CALC-MCNC: 55 MG/DL (ref 0–99)
SL AMB VLDL CHOLESTEROL CALC: 9 MG/DL (ref 5–40)
TRIGL SERPL-MCNC: 34 MG/DL (ref 0–149)
TSH SERPL DL<=0.005 MIU/L-ACNC: 0.46 UIU/ML (ref 0.45–4.5)

## 2023-02-08 ENCOUNTER — OFFICE VISIT (OUTPATIENT)
Dept: AUDIOLOGY | Facility: CLINIC | Age: 48
End: 2023-02-08

## 2023-02-08 DIAGNOSIS — H90.3 SENSORINEURAL HEARING LOSS (SNHL), BILATERAL: Primary | ICD-10-CM

## 2023-02-09 LAB
ALBUMIN SERPL-MCNC: 4.4 G/DL (ref 3.8–4.8)
ALBUMIN/GLOB SERPL: 1.8 {RATIO} (ref 1.2–2.2)
ALP SERPL-CCNC: 54 IU/L (ref 44–121)
ALT SERPL-CCNC: 8 IU/L (ref 0–32)
APPEARANCE UR: ABNORMAL
APTT SCREEN TO CONFIRM RATIO: 0.9 RATIO (ref 0–1.34)
AST SERPL-CCNC: 21 IU/L (ref 0–40)
B2 GLYCOPROT1 IGA SER-ACNC: <9 GPI IGA UNITS (ref 0–25)
B2 GLYCOPROT1 IGG SER-ACNC: <9 GPI IGG UNITS (ref 0–20)
B2 GLYCOPROT1 IGM SER-ACNC: <9 GPI IGM UNITS (ref 0–32)
BACTERIA URNS QL MICRO: ABNORMAL
BASOPHILS # BLD AUTO: 0.1 X10E3/UL (ref 0–0.2)
BASOPHILS NFR BLD AUTO: 1 %
BILIRUB SERPL-MCNC: 0.6 MG/DL (ref 0–1.2)
BILIRUB UR QL STRIP: NEGATIVE
BUN SERPL-MCNC: 12 MG/DL (ref 6–24)
BUN/CREAT SERPL: 14 (ref 9–23)
C3 SERPL-MCNC: 100 MG/DL (ref 82–167)
C4 SERPL-MCNC: 18 MG/DL (ref 12–38)
CALCIUM SERPL-MCNC: 9.2 MG/DL (ref 8.7–10.2)
CARDIOLIPIN IGA SER IA-ACNC: <9 APL U/ML (ref 0–11)
CARDIOLIPIN IGG SER IA-ACNC: <9 GPL U/ML (ref 0–14)
CARDIOLIPIN IGM SER IA-ACNC: <9 MPL U/ML (ref 0–12)
CASTS URNS QL MICRO: ABNORMAL /LPF
CCP IGA+IGG SERPL IA-ACNC: 4 UNITS (ref 0–19)
CENTROMERE B AB SER-ACNC: <0.2 AI (ref 0–0.9)
CHLORIDE SERPL-SCNC: 102 MMOL/L (ref 96–106)
CO2 SERPL-SCNC: 23 MMOL/L (ref 20–29)
COLOR UR: YELLOW
CONFIRM APTT/NORMAL: 34.5 SEC (ref 0–47.6)
CREAT SERPL-MCNC: 0.85 MG/DL (ref 0.57–1)
CREAT UR-MCNC: 188.9 MG/DL
CRP SERPL-MCNC: 1 MG/L (ref 0–10)
DSDNA AB SER-ACNC: 1 IU/ML (ref 0–9)
EGFR: 85 ML/MIN/1.73
ENA JO1 AB SER-ACNC: <0.2 AI (ref 0–0.9)
ENA RNP AB SER-ACNC: <0.2 AI (ref 0–0.9)
ENA SCL70 AB SER-ACNC: <0.2 AI (ref 0–0.9)
ENA SM AB SER-ACNC: 0.3 AI (ref 0–0.9)
ENA SS-A AB SER-ACNC: <0.2 AI (ref 0–0.9)
ENA SS-B AB SER-ACNC: <0.2 AI (ref 0–0.9)
EOSINOPHIL # BLD AUTO: 0.1 X10E3/UL (ref 0–0.4)
EOSINOPHIL NFR BLD AUTO: 2 %
EPI CELLS #/AREA URNS HPF: >10 /HPF (ref 0–10)
ERYTHROCYTE [DISTWIDTH] IN BLOOD BY AUTOMATED COUNT: 13 % (ref 11.7–15.4)
ERYTHROCYTE [SEDIMENTATION RATE] IN BLOOD BY WESTERGREN METHOD: 2 MM/HR (ref 0–32)
FERRITIN SERPL-MCNC: 59 NG/ML (ref 15–150)
GLOBULIN SER-MCNC: 2.5 G/DL (ref 1.5–4.5)
GLUCOSE SERPL-MCNC: 75 MG/DL (ref 70–99)
GLUCOSE UR QL: NEGATIVE
HBV SURFACE AG SERPL QL IA: NEGATIVE
HCT VFR BLD AUTO: 38.1 % (ref 34–46.6)
HCV AB S/CO SERPL IA: <0.1 S/CO RATIO (ref 0–0.9)
HGB BLD-MCNC: 12.7 G/DL (ref 11.1–15.9)
HGB UR QL STRIP: NEGATIVE
HLA-B27 QL NAA+PROBE: NEGATIVE
IMM GRANULOCYTES # BLD: 0 X10E3/UL (ref 0–0.1)
IMM GRANULOCYTES NFR BLD: 0 %
KETONES UR QL STRIP: ABNORMAL
LA PPP-IMP: NORMAL
LEUKOCYTE ESTERASE UR QL STRIP: NEGATIVE
LYMPHOCYTES # BLD AUTO: 1.9 X10E3/UL (ref 0.7–3.1)
LYMPHOCYTES NFR BLD AUTO: 37 %
MCH RBC QN AUTO: 29.9 PG (ref 26.6–33)
MCHC RBC AUTO-ENTMCNC: 33.3 G/DL (ref 31.5–35.7)
MCV RBC AUTO: 90 FL (ref 79–97)
MICRO URNS: ABNORMAL
MICRO URNS: ABNORMAL
MONOCYTES # BLD AUTO: 0.4 X10E3/UL (ref 0.1–0.9)
MONOCYTES NFR BLD AUTO: 7 %
NEUTROPHILS # BLD AUTO: 2.6 X10E3/UL (ref 1.4–7)
NEUTROPHILS NFR BLD AUTO: 53 %
NITRITE UR QL STRIP: NEGATIVE
PH UR STRIP: 5 [PH] (ref 5–7.5)
PLATELET # BLD AUTO: 232 X10E3/UL (ref 150–450)
POTASSIUM SERPL-SCNC: 3.8 MMOL/L (ref 3.5–5.2)
PROT SERPL-MCNC: 6.9 G/DL (ref 6–8.5)
PROT UR QL STRIP: NEGATIVE
PROT UR-MCNC: 11.3 MG/DL
PROT/CREAT UR: 60 MG/G CREAT (ref 0–200)
RBC # BLD AUTO: 4.25 X10E6/UL (ref 3.77–5.28)
RBC #/AREA URNS HPF: ABNORMAL /HPF (ref 0–2)
RHEUMATOID FACT SERPL-ACNC: <10 IU/ML
SCREEN APTT: 33.1 SEC (ref 0–51.9)
SCREEN DRVVT: 32.8 SEC (ref 0–47)
SODIUM SERPL-SCNC: 139 MMOL/L (ref 134–144)
SP GR UR: 1.02 (ref 1–1.03)
THROMBIN TIME: 17 SEC (ref 0–23)
THYROGLOB AB SERPL-ACNC: <1 IU/ML (ref 0–0.9)
THYROPEROXIDASE AB SERPL-ACNC: <9 IU/ML (ref 0–34)
UROBILINOGEN UR STRIP-ACNC: 0.2 MG/DL (ref 0.2–1)
WBC # BLD AUTO: 5 X10E3/UL (ref 3.4–10.8)
WBC #/AREA URNS HPF: ABNORMAL /HPF (ref 0–5)

## 2023-02-09 NOTE — PROGRESS NOTES
[place] Hearing Aid Fitting    Fide Gotti was seen today (2/8/2023) for a binaural hearing aid fitting of her Oticon More 1 miniRITE R hearing aid(s)  Ms Hermelindo Garcia was unaccompanied to today's visit    Device Information    Hearing Aid Fitting Date: 2/8/2023       Left Device Right Device   Hearing Aid Make: Akua Mata   Hearing Aid Model: MORE 1 miniRITE MORE 1 miniRITE   Serial Number: [id number]   Repair Warranty Expiration Date: 02/11/2026 02/11/2026   Loss/Damage Warranty Expiration Date:  02/11/2026 02/11/2026    Length: 1 1    Output: 60 60   Wax System: Pro Wax miniFIT Pro Wax miniFIT   Dome Size/Style: 8mm open 8mm open   Battery: Lithium-ion Rechargeable Lithium-ion Rechargeable   [employer] Serial Number: N/A N/A   Nilesh Cochran Date:  N/A N/A      Serial Number: [id number]  Accessories: N/A      Initial Hearing Aid Settings    Hearing aid(s) were programmed using [employer] software's VAC+ fitting formula and were validated by Ms Hermelindo Garcia for perceived comfort levels  • Manual control button was deactivated  • Hearing aids set to experience level 3 per Ms  Veronique's subjective listening preference    Device Orientation    Ms Hermelindo Garcia was counseled on device components and component function  Proper insertion and removal of the aid(s) was demonstrated  The importance of realistic expectations with expectation, especially in the presence of background noise, was emphasized  Hearing aids are assistive devices and are not designed to restore normal hearing sensitivity  The need for daily consistent usage (8-12 hours per day) for proper device acclimatization, as well as the importance of self-advocacy and practicing effective communication strategies was outlined  Effective communication strategies include:  1 ) Maintaining face-to-face communication, allowing for speechreading of facial expressions, lips, and gestures    2 ) Reducing background noise and distance between communication partners  3 ) Having communication partners reduce their rate of speech when appropriate  4 ) Beginning conversation by getting communication partner's attention and stating the topic, allowing for context clues to help fill in gaps in comprehension  5 ) Asking for rephrasing of missed aspects of conversation rather than asking for repetitions  Ms Kristen Carrasquillo was given the information booklet (or QR code pamphlet to the booklet) regarding hearing aid usage and operation, hearing aid cleaning tools, and hearing aid carrying case  Hearing aid repair and loss and damage warranties offered through the  were outlined thoroughly  Ms Kristen Carrasquillo agreed to the terms of sale listed on the purchase agreement containing device specifications, warranties, pricing information, as well as [facility] 45-day trial period timeline  After this period has elapsed, hearing aids cannot be returned  Recommendations & Follow-up    Ms Kristen Carrasquillo demonstrated understanding of the topics discussed  It appears that she has realistic expectations regarding the benefits and limitations with the use of amplification  The prognosis for successful hearing aid use is good  While improvement with amplification is expected, there will always be word understanding problems due to the inherent nature of hearing loss  These problems will be greater in background noise and adverse listening environments than in quiet situations  Ms Kristen Carrasquillo is to follow-up in 2-3 weeks for a hearing aid check within the trial period as scheduled  At this time of Ms Pipre's next visit, the following topics should be reviewed: wax guard removal/replacement, increasing adaptation/gain settings after initial acclimatization, activation of push button/toggle switches for manual volume control, as well as noise reductive features in [employer] 2 software  The above recommendations were discussed with Ms Kristen Carrasquillo   It was a pleasure working with Ms Casarez Radha today  She was encouraged to contact the clinic with any further questions/concerns in the meantime  Karrie Fowler    Clinical Audiologist    [address] [place] [id number]

## 2023-02-10 ENCOUNTER — OFFICE VISIT (OUTPATIENT)
Dept: FAMILY MEDICINE CLINIC | Facility: CLINIC | Age: 48
End: 2023-02-10

## 2023-02-10 VITALS
HEIGHT: 67 IN | SYSTOLIC BLOOD PRESSURE: 102 MMHG | DIASTOLIC BLOOD PRESSURE: 72 MMHG | RESPIRATION RATE: 16 BRPM | WEIGHT: 156.2 LBS | TEMPERATURE: 98.3 F | BODY MASS INDEX: 24.52 KG/M2 | HEART RATE: 88 BPM

## 2023-02-10 DIAGNOSIS — N30.01 ACUTE CYSTITIS WITH HEMATURIA: Primary | ICD-10-CM

## 2023-02-10 DIAGNOSIS — R31.9 HEMATURIA, UNSPECIFIED TYPE: ICD-10-CM

## 2023-02-10 LAB
SL AMB  POCT GLUCOSE, UA: NORMAL
SL AMB LEUKOCYTE ESTERASE,UA: 75
SL AMB POCT BILIRUBIN,UA: NORMAL
SL AMB POCT BLOOD,UA: 250
SL AMB POCT CLARITY,UA: CLEAR
SL AMB POCT COLOR,UA: YELLOW
SL AMB POCT KETONES,UA: NORMAL
SL AMB POCT NITRITE,UA: NORMAL
SL AMB POCT PH,UA: 5
SL AMB POCT SPECIFIC GRAVITY,UA: 1015
SL AMB POCT URINE PROTEIN: NORMAL
SL AMB POCT UROBILINOGEN: NORMAL

## 2023-02-10 RX ORDER — FLUCONAZOLE 150 MG/1
150 TABLET ORAL
Qty: 2 TABLET | Refills: 0 | Status: SHIPPED | OUTPATIENT
Start: 2023-02-10 | End: 2023-02-15

## 2023-02-10 RX ORDER — CIPROFLOXACIN 250 MG/1
250 TABLET, FILM COATED ORAL EVERY 12 HOURS SCHEDULED
Qty: 6 TABLET | Refills: 0 | Status: SHIPPED | OUTPATIENT
Start: 2023-02-10 | End: 2023-02-13

## 2023-02-10 NOTE — PROGRESS NOTES
Chief Complaint   Patient presents with   • Blood in Urine        Patient ID: Alexa Grandchild is a 52 y o  female  Urinary Tract Infection   This is a new problem  The current episode started yesterday  The problem occurs every urination  The problem has been unchanged  The quality of the pain is described as aching  The pain is at a severity of 5/10  The pain is mild  There has been no fever  There is no history of pyelonephritis  Associated symptoms include frequency, hematuria and urgency  Pertinent negatives include no chills, discharge, flank pain, hesitancy, nausea, possible pregnancy, sweats or vomiting  She has tried nothing for the symptoms  The treatment provided no relief  There is no history of catheterization, kidney stones, recurrent UTIs, a single kidney, urinary stasis or a urological procedure  The following portions of the patient's history were reviewed and updated as appropriate: allergies, current medications, past family history, past medical history, past social history, past surgical history and problem list     Review of Systems   Constitutional: Negative for chills  Cardiovascular: Negative  Gastrointestinal: Negative for abdominal pain, nausea and vomiting  Genitourinary: Positive for frequency, hematuria and urgency  Negative for flank pain and hesitancy  Current Outpatient Medications   Medication Sig Dispense Refill   • albuterol (PROVENTIL HFA,VENTOLIN HFA) 90 mcg/act inhaler INHALE TWO PUFFS EVERY 6 (SIX) HOURS AS NEEDED FOR WHEEZING 18 g 4   • Biotin 10 MG TABS Take by mouth     • cholecalciferol (VITAMIN D3) 1,000 units tablet Take 1,000 Units by mouth daily 5000 unit daily      • diazepam (VALIUM) 5 mg tablet      • fluticasone (FLOVENT HFA) 110 MCG/ACT inhaler Inhale 1 puff 2 (two) times a day Rinse mouth after use   (Patient taking differently: Inhale 1 puff 2 (two) times a day as needed Rinse mouth after use ) 1 Inhaler 0   • folic acid (FOLVITE) 1 mg tablet daily     • gabapentin (NEURONTIN) 100 mg capsule Take 1 capsule (100 mg total) by mouth daily 30 capsule 0   • hydroxychloroquine (PLAQUENIL) 200 mg tablet Take 200 mg by mouth 2 (two) times a day  0   • ibuprofen (MOTRIN) 600 mg tablet Take 1 tablet (600 mg total) by mouth every 8 (eight) hours as needed for mild pain 30 tablet 0   • meclizine (ANTIVERT) 25 mg tablet Take 1 tablet (25 mg total) by mouth every 12 (twelve) hours as needed for dizziness or nausea 60 tablet 0   • methotrexate 2 5 mg tablet Take 8 tabs once weekly  96 tablet 0   • midodrine (PROAMATINE) 2 5 mg tablet TAKE 1 TABLET (2 5 MG TOTAL) BY MOUTH THREE (THREE) TIMES A DAY 90 tablet 1   • naproxen (NAPROSYN) 500 mg tablet take 1 tablet by mouth twice a day with MEALS (Patient taking differently: as needed) 60 tablet 0   • nystatin (MYCOSTATIN) cream Apply topically 2 (two) times a day 30 g 0   • Omega-3 Fatty Acids (FISH OIL) 1,000 mg Take 1,000 mg by mouth daily     • omeprazole (PriLOSEC) 20 mg delayed release capsule take 1 capsule by mouth once daily (Patient taking differently: daily as needed) 30 capsule 0   • sodium chloride (OCEAN) 0 65 % nasal spray 1 spray into each nostril     • SUMAtriptan (IMITREX) 100 mg tablet TAKE 1 TABLET (100 MG TOTAL) BY MOUTH ONCE AS NEEDED FOR MIGRAINE FOR UP TO 1 DOSE TAKE WHEN EXPERI* 9 tablet 0   • gabapentin (NEURONTIN) 600 MG tablet Take 1 tablet (600 mg total) by mouth 2 (two) times a day for 30 days (Patient taking differently: Take 600 mg by mouth daily at bedtime) 60 tablet 0     No current facility-administered medications for this visit  Objective:    /72 (BP Location: Left arm, Patient Position: Sitting, Cuff Size: Standard)   Pulse 88   Temp 98 3 °F (36 8 °C)   Resp 16   Ht 5' 7" (1 702 m)   Wt 70 9 kg (156 lb 3 2 oz)   BMI 24 46 kg/m²        Physical Exam  Constitutional:       Appearance: She is not ill-appearing  Cardiovascular:      Rate and Rhythm: Normal rate  Pulmonary:      Effort: Pulmonary effort is normal    Abdominal:      Palpations: Abdomen is soft  Tenderness: There is no abdominal tenderness  There is no right CVA tenderness or left CVA tenderness  Neurological:      Mental Status: She is alert  Labs in chart were reviewed  Recent Results (from the past 672 hour(s))   IGP [place] HPV    Collection Time: 01/18/23 12:00 AM   Result Value Ref Range    Diagnosis: Comment     Specimen Adequacy Comment     Clinician Provided ICD10 Comment     Performed by: Comment      AMB Roanna Schlatter Note: Comment     Test Methodology: Comment     HPV Aptima Negative Negative   POCT urine dip auto non-scope    Collection Time: 01/18/23  4:04 PM   Result Value Ref Range     COLOR,UA yellow     CLARITY,UA clear     SPECIFIC GRAVITY,UA 1 005      PH,UA 5     LEUKOCYTE ESTERASE,UA neg     NITRITE,UA neg     GLUCOSE, UA norm     KETONES,UA neg     BILIRUBIN,UA neg     BLOOD,UA neg     POCT URINE PROTEIN neg     SL AMB POCT UROBILINOGEN norm    Lipid panel    Collection Time: 02/02/23 10:46 AM   Result Value Ref Range    Cholesterol, Total 150 100 - 199 mg/dL    Triglycerides 34 0 - 149 mg/dL    HDL 86 >39 mg/dL    VLDL Cholesterol Calculated 9 5 - 40 mg/dL    LDL Calculated 55 0 - 99 mg/dL    T  Chol/HDL Ratio 1 7 0 0 - 4 4 ratio   TSH, 3rd generation with Free T4 reflex    Collection Time: 02/02/23 10:46 AM   Result Value Ref Range    TSH 0 458 0 450 - 4 500 uIU/mL   Urinalysis with microscopic    Collection Time: 02/02/23 10:47 AM   Result Value Ref Range    Specific Gravity 1 025 1 005 - 1 030    Ph 5 0 5 0 - 7 5    Color UA Yellow Yellow    Urine Appearance Turbid (A) Clear    Leukocyte Esterase Negative Negative    Protein Negative Negative/Trace    Glucose, 24 HR Urine Negative Negative    Ketone, Urine Trace (A) Negative    Blood, Urine Negative Negative    Bilirubin, Urine Negative Negative    Urobilinogen Urine 0 2 0 2 - 1 0 mg/dL    SL AMB NITRITES URINE, QUAL  Negative Negative    Microscopic Examination Comment     Microscopic Examination See below:    Protein / creatinine ratio, urine    Collection Time: 02/02/23 10:47 AM   Result Value Ref Range    Creatinine, Urine 188 9 Not Estab  mg/dL    Total Protein, Urine 11 3 Not Estab  mg/dL    Prot/Creat Ratio, Ur 60 0 - 200 mg/g creat   CBC and differential    Collection Time: 02/02/23 10:47 AM   Result Value Ref Range    White Blood Cell Count 5 0 3 4 - 10 8 x10E3/uL    Red Blood Cell Count 4 25 3 77 - 5 28 x10E6/uL    Hemoglobin 12 7 11 1 - 15 9 g/dL    HCT 38 1 34 0 - 46 6 %    MCV 90 79 - 97 fL    MCH 29 9 26 6 - 33 0 pg    MCHC 33 3 31 5 - 35 7 g/dL    RDW 13 0 11 7 - 15 4 %    Platelet Count 477 510 - 450 x10E3/uL    Neutrophils 53 Not Estab  %    Lymphocytes 37 Not Estab  %    Monocytes 7 Not Estab  %    Eosinophils 2 Not Estab  %    Basophils PCT 1 Not Estab  %    Neutrophils (Absolute) 2 6 1 4 - 7 0 x10E3/uL    Lymphocytes (Absolute) 1 9 0 7 - 3 1 x10E3/uL    Monocytes (Absolute) 0 4 0 1 - 0 9 x10E3/uL    Eosinophils (Absolute) 0 1 0 0 - 0 4 x10E3/uL    Basophils ABS 0 1 0 0 - 0 2 x10E3/uL    Immature Granulocytes 0 Not Estab  %    Immature Granulocytes (Absolute) 0 0 0 0 - 0 1 x10E3/uL   Comprehensive metabolic panel    Collection Time: 02/02/23 10:47 AM   Result Value Ref Range    Glucose, Random 75 70 - 99 mg/dL    BUN 12 6 - 24 mg/dL    Creatinine 0 85 0 57 - 1 00 mg/dL    eGFR 85 >59 mL/min/1 73    SL AMB BUN/CREATININE RATIO 14 9 - 23    Sodium 139 134 - 144 mmol/L    Potassium 3 8 3 5 - 5 2 mmol/L    Chloride 102 96 - 106 mmol/L    CO2 23 20 - 29 mmol/L    CALCIUM 9 2 8 7 - 10 2 mg/dL    Protein, Total 6 9 6 0 - 8 5 g/dL    Albumin 4 4 3 8 - 4 8 g/dL    Globulin, Total 2 5 1 5 - 4 5 g/dL    Albumin/Globulin Ratio 1 8 1 2 - 2 2    TOTAL BILIRUBIN 0 6 0 0 - 1 2 mg/dL    Alk Phos Isoenzymes 54 44 - 121 IU/L    AST 21 0 - 40 IU/L    ALT 8 0 - 32 IU/L   C-reactive protein    Collection Time: 02/02/23 10:47 AM Result Value Ref Range    C-Reactive Protein, Quant 1 0 - 10 mg/L   Sedimentation rate, automated    Collection Time: 02/02/23 10:47 AM   Result Value Ref Range    Sedimentation Rate 2 0 - 32 mm/hr   C4 complement    Collection Time: 02/02/23 10:47 AM   Result Value Ref Range    C4, COMPLEMENT 18 12 - 38 mg/dL   C3 complement    Collection Time: 02/02/23 10:47 AM   Result Value Ref Range    C3 Complement 100 82 - 167 mg/dL   Anti-DNA antibody, double-stranded    Collection Time: 02/02/23 10:47 AM   Result Value Ref Range    ds DNA Ab 1 0 - 9 IU/mL   Anti-David 1 Antibody    Collection Time: 02/02/23 10:47 AM   Result Value Ref Range    Anti DAVID-1 <0 2 0 0 - 0 9 AI   Anti-scleroderma antibody    Collection Time: 02/02/23 10:47 AM   Result Value Ref Range    Scleroderma SCL-70 <0 2 0 0 - 0 9 AI   Centromere Antibody    Collection Time: 02/02/23 10:47 AM   Result Value Ref Range    Anti-Centromere B Antibodies <0 2 0 0 - 0 9 AI   Nuclear antigen antibody    Collection Time: 02/02/23 10:47 AM   Result Value Ref Range    ANJALI RNP Ab <0 2 0 0 - 0 9 AI    ANJALI Barrios (SM) Ab 0 3 0 0 - 0 9 AI   Sjogren's Antibodies    Collection Time: 02/02/23 10:47 AM   Result Value Ref Range    SS-A (RO) Ab <0 2 0 0 - 0 9 AI    SS-B (LA) Ab <0 2 0 0 - 0 9 AI   Beta-2 glycoprotein antibodies    Collection Time: 02/02/23 10:47 AM   Result Value Ref Range    Beta-2 Glyco 1 IgG <9 0 - 20 GPI IgG units    Beta-2 Glyco 1 IgA <9 0 - 25 GPI IgA units    Beta-2 Glyco 1 IgM <9 0 - 32 GPI IgM units   Cardiolipin antibody    Collection Time: 02/02/23 10:47 AM   Result Value Ref Range    Anticardiolipin IgG <9 0 - 14 GPL U/mL    Anticardiolipin IgM <9 0 - 12 MPL U/mL    Anticardiolipin IgA <9 0 - 11 APL U/mL   Lupus anticoagulant    Collection Time: 02/02/23 10:47 AM   Result Value Ref Range    DILUTE PROTHROMBIN TIME(DPT) 34 5 0 0 - 47 6 sec    DPT CONFIRM RATIO 0 90 0 00 - 1 34 Ratio    THROMBIN TIME (DRVW) 17 0 0 0 - 23 0 sec    PTT Lupus Anticoagulant 33 1 0 0 - 51 9 sec    Dilute Viper Venom Time 32 8 0 0 - 47 0 sec    LUPUS REFLEX INTERPRETATION Comment:    Rheumatoid Arthritis Profile    Collection Time: 02/02/23 10:47 AM   Result Value Ref Range    Rheumatoid Factor (RF) <10 0 <14 0 IU/mL    ANTI-CCP AB, IGG/IGA 4 0 - 19 units   Hepatitis B surface antigen    Collection Time: 02/02/23 10:47 AM   Result Value Ref Range    HBsAg Screen Negative Negative   Hepatitis C antibody    Collection Time: 02/02/23 10:47 AM   Result Value Ref Range    HEP C AB <0 1 0 0 - 0 9 s/co ratio   Ferritin    Collection Time: 02/02/23 10:47 AM   Result Value Ref Range    Ferritin 59 15 - 150 ng/mL   Thyroid Antibodies Panel    Collection Time: 02/02/23 10:47 AM   Result Value Ref Range    THYROID MICROSOMAL ANTIBODY <9 0 - 34 IU/mL    Thyroglobulin Ab <1 0 0 0 - 0 9 IU/mL   HLA-B27 antigen    Collection Time: 02/02/23 10:47 AM   Result Value Ref Range    HLA B27 Negative    Microscopic Examination    Collection Time: 02/02/23 10:47 AM   Result Value Ref Range    SL AMB WBC, URINE 0-5 0 - 5 /hpf    RBC, Urine None seen 0 - 2 /hpf    Epithelial Cells (non renal) >10 (A) 0 - 10 /hpf    Casts None seen None seen /lpf    Bacteria, Urine Few None seen/Few   POCT urine dip auto non-scope    Collection Time: 02/10/23  1:28 PM   Result Value Ref Range     COLOR,UA yellow     CLARITY,UA clear     SPECIFIC GRAVITY,UA 1,015      PH,UA 5     LEUKOCYTE ESTERASE,UA 75     NITRITE,UA neg     GLUCOSE, UA norm     KETONES,UA neg     BILIRUBIN,UA neg     BLOOD,     POCT URINE PROTEIN tr     SL AMB POCT UROBILINOGEN norm      Assessment/Plan:         Diagnoses and all orders for this visit:    Acute cystitis with hematuria  -     fluconazole (DIFLUCAN) 150 mg tablet; Take 1 tablet (150 mg total) by mouth every third day for 2 doses  -     ciprofloxacin (CIPRO) 250 mg tablet;  Take 1 tablet (250 mg total) by mouth every 12 (twelve) hours for 3 days    Hematuria, unspecified type  -     POCT urine dip auto non-scope  -     Urine culture      rto prn                   Lorin Brown MD

## 2023-02-13 ENCOUNTER — TELEPHONE (OUTPATIENT)
Dept: GASTROENTEROLOGY | Facility: AMBULARY SURGERY CENTER | Age: 48
End: 2023-02-13

## 2023-02-13 NOTE — TELEPHONE ENCOUNTER
Spoke w/ pt/confirmed colonoscopy w/ dr Mary Madison for 2/22/2023 at [facility]/pt states to send bowel prep to [facility] (sent)

## 2023-02-14 LAB
BACTERIA UR CULT: NORMAL
Lab: NO GROWTH

## 2023-02-15 NOTE — PROGRESS NOTES
Assessment and Plan:   Ms Candis Salazar a 19-year-old female with history significant for systemic lupus erythematosus and fibromyalgia who presents for a follow-up  She is currently on hydroxychloroquine 200 mg twice daily, methotrexate 20 mg once weekly with folic acid 3 mg daily and gabapentin 600 mg at bedtime        - Halina Massey presents today for a follow-up of systemic lupus erythematosus and fibromyalgia that was diagnosed in 2017  Since then she has been on hydroxychloroquine at 200 mg twice daily and methotrexate 20 mg once weekly which does help to some degree with her overall pain  She also seems to be symptomatic due to the fibromyalgia diagnosis and does report good relief with gabapentin 600 mg nightly but was unable to tolerate the morning dose due to excessive drowsiness  She overall appears to be clinically stable at this time and it is very reassuring to note that her recent serologies were all normal   She will continue the hydroxychloroquine and methotrexate unchanged and follow-up with ophthalmology for annual eye exams  She will update high risk medication lab monitoring and the connective tissue disease lab monitoring prior to the follow-up visit      - In regards to the spinal degenerative arthritis and chronic back pain she is establishing with Dr Dwight Flores on 3/14/2023  Plan:  Diagnoses and all orders for this visit:    Systemic lupus erythematosus, unspecified SLE type, unspecified organ involvement status ([facility] [address] )  -     CBC and differential; Future  -     Comprehensive metabolic panel; Future  -     C-reactive protein; Future  -     Sedimentation rate, automated; Future  -     C4 complement; Future  -     C3 complement; Future  -     Anti-DNA antibody, double-stranded; Future  -     Urinalysis with microscopic  -     Protein / creatinine ratio, urine  -     methotrexate 2 5 mg tablet; Take 8 tabs once weekly  -     hydroxychloroquine (PLAQUENIL) 200 mg tablet;  Take 1 tablet (200 mg total) by mouth 2 (two) times a day with meals  -     folic acid (FOLVITE) 1 mg tablet; Take 3 tablets (3 mg total) by mouth daily  -     CBC and differential  -     Comprehensive metabolic panel  -     C-reactive protein  -     Sedimentation rate, automated  -     C4 complement  -     C3 complement  -     Anti-DNA antibody, double-stranded    Long-term use of Plaquenil    Methotrexate, long term, current use  -     folic acid (FOLVITE) 1 mg tablet; Take 3 tablets (3 mg total) by mouth daily    Fibromyalgia    Osteoarthritis of cervical spine, unspecified spinal osteoarthritis complication status    Osteoarthritis of lumbar spine, unspecified spinal osteoarthritis complication status    Plantar fasciitis of right foot      Activities as tolerated  Exercise: try to maintain a low impact exercise regimen as much as possible  Walk for 30 minutes a day for at least 3 days a week  Continue other medications as prescribed by PCP and other specialists  RTC in       HPI    INITIAL VISIT NOTE (10/2022):  Ms Portillo Hedge a 26-year-old female with history significant for systemic lupus erythematosus and fibromyalgia who presents to [facility] with [address] [place] [address] Rheumatology  She is currently on hydroxychloroquine 200 mg twice daily, methotrexate 20 mg once weekly with folic acid 1 mg daily and gabapentin 600 mg at bedtime  She is transferring care from Dr Rodney Nguyen  She is self-referred today        I am unable to access her initial labs but based on her prior rheumatology records she was diagnosed with systemic lupus erythematosus in 2017 when she presented with a positive GLENNA, double-stranded DNA antibody as well as diffuse arthralgias  Based on labs that I am able to access dating back to 2020 she has presented with a normal GLENNA, double-stranded DNA antibody, inflammatory markers and complements    She mentions at the time of her diagnosis she was started on hydroxychloroquine 200 mg twice daily which she has been on since then and follows regularly with Ophthalmology  She did not feel like the hydroxychloroquine really impacted her symptoms and a few months after her diagnosis methotrexate was added on currently at a dose of 20 mg once weekly  She reports that this does help to some degree  She also has a diagnosis of fibromyalgia given the ongoing pain and eventually duloxetine was added but as she developed side effects to this it was discontinued and currently she is on gabapentin 600 mg nightly which helps with the body pain and her sleep  She was initially prescribed 600 mg twice daily but stopped the morning dose as it was causing excessive drowsiness  She takes an occasional over-the-counter Advil as needed which does help her  She reports that she still experiences pain in her hands and knees without any joint swelling  She describes morning stiffness that takes a few minutes to improve as well as gelling phenomenon      She reports previously she was experiencing significant hair loss but this has improved since starting biotin  She does feel warm at times but has not checked her temperature  Intermittently she will notice a redness appearance on her neck but denies other skin rashes  At times she has noticed a sun sensitive skin rash arise on her legs  She thinks that she may manifest with nose and mouth sores but has never visualized this  She reports a history of 1 episode of colitis many years ago    She does not experience classic Raynaud's symptoms with color changes on cold exposure but reports that with slight cold exposure she will notice icy feet      She denies fevers, unintentional weight loss, ongoing alopecia, dry eyes, dry mouth, inflammatory eye disease, psoriasis, swollen glands, pleuritic chest pain, inflammatory bowel disease, blood clots, miscarriages or a family history of autoimmune disease      I reviewed her most recent labs from August 2022 which showed an unremarkable CBC, CMP, ESR, CRP, C3, C4, double-stranded DNA antibody, GLENNA screen and urinalysis  2/16/2023:  Patient presents for a follow-up of systemic lupus erythematosus and fibromyalgia  She is currently on hydroxychloroquine 200 mg twice daily, methotrexate 20 mg once weekly with folic acid 3 mg daily and gabapentin 600 mg at bedtime  I reviewed her testing done on 2/2/2023 and this showed a normal GLENNA specificity, ESR, CRP, C3, C4, antiphospholipid antibody testing, urine analysis, urine protein creatinine ratio, rheumatoid factor, anti-CCP antibody, ferritin, HLA-B27 antigen, chronic hepatitis panel, CBC, CMP and thyroid antibody profile  She is not reporting any new complaints today but is continuing to experience fatigue  She is a single parent and cares for her 6year-old twins and 10year-old child who has ADHD and reports that this keeps her very busy  She is not reporting any fevers, unintentional weight loss, skin rashes, mouth/nose ulcers, swollen glands, pleuritic chest pain or new joint pains at this time  The following portions of the patient's history were reviewed and updated as appropriate: allergies, current medications, past family history, past medical history, past social history, past surgical history and problem list       Review of Systems  Constitutional: Negative for weight change, fevers, chills, night sweats  Positive for fatigue  ENT/Mouth: Negative for hearing changes, ear pain, nasal congestion, sinus pain, hoarseness, sore throat, rhinorrhea, swallowing difficulty  Eyes: Negative for pain, redness, discharge, vision changes  Cardiovascular: Negative for chest pain, SOB, palpitations  Respiratory: Negative for cough, sputum, wheezing, dyspnea  Gastrointestinal: Negative for nausea, vomiting, diarrhea, constipation, pain, heartburn  Genitourinary: Negative for dysuria, urinary frequency, hematuria  Musculoskeletal: As per HPI  Skin: Negative for skin rash, color changes     Neuro: Negative for weakness, numbness, tingling, loss of consciousness  Psych: Negative for anxiety, depression  Heme/Lymph: Negative for easy bruising, bleeding, lymphadenopathy  Past Medical History:   Diagnosis Date   • Acquired ankle/foot deformity     last assessed: 2017   • Anxiety    • Chronic pain disorder     right foot plantar   • Dermatomycosis 2008   • Dysfunctional uterine bleeding     Last assessed: 2018   • Headache    • Lupus ([address] )    • Mass of knee     last assessed: 2014   • Mild acid reflux    • Paronychia of finger     last assessed: 2013   • Paronychia of toe     last asssessed: 2016   right foot   • Patellofemoral dysfunction     last assessed: 3/21/2014   • PCOS (polycystic ovarian syndrome) 12/15/2007   • Pes planus, congenital     last assessed: 2017   • Plantar fasciitis of right foot    • Plantar fibromatosis     last assessed: 2017   • PONV (postoperative nausea and vomiting)    • Trichomoniasis     last assessed: 2015   • Vaginal candidiasis     last assessed: 2016   • Varicose veins of lower extremity     last assessed: 3/5/2014   • Vertigo     last assessed: 3/5/2014   • Viral gastroenteritis     last assessed: 2015       Past Surgical History:   Procedure Laterality Date   • BACK SURGERY      discectomy lumbar   •  SECTION      x2 bikini   • PILONIDAL CYST EXCISION     • SD HYSTEROSCOPY BX ENDOMETRIUM&/POLYPC W/WO D&C N/A 10/18/2017    Procedure: HYSTEROSCOPY AND FRACTIONAL DILATATION AND CURRETTAGE;  Surgeon: Nicolasa Pulido MD;  Location: [facility] MAIN OR;  Service: Gynecology   • TUBAL LIGATION     • WISDOM TOOTH EXTRACTION         Social History     Socioeconomic History   • Marital status: Single     Spouse name: Not on file   • Number of children: Not on file   • Years of education: Not on file   • Highest education level: Not on file   Occupational History   • Not on file   Tobacco Use   • Smoking status: Former     Packs/day: 0 50     Years: 20 00     Pack years: 10 00     Types: Cigarettes     Quit date:      Years since quittin 1   • Smokeless tobacco: Never   Vaping Use   • Vaping Use: Never used   Substance and Sexual Activity   • Alcohol use: Yes     Comment: occ   • Drug use: No   • Sexual activity: Not on file   Other Topics Concern   • Not on file   Social History Narrative    Daily cola consumption    Daily Tea Consumption     Social Determinants of Health     Financial Resource Strain: Not on file   Food Insecurity: Not on file   Transportation Needs: Not on file   Physical Activity: Not on file   Stress: Not on file   Social Connections: Not on file   Intimate Partner Violence: Not on file   Housing Stability: Not on file       Family History   Problem Relation Age of Onset   • Cancer Mother         skin , gallbladder   • Liver disease Mother    • Hypertension Father    • Heart disease Sister    • Cancer Brother         skin   • No Known Problems Daughter    • No Known Problems Son    • No Known Problems Son    • Colon cancer Maternal Uncle    • Arthritis Family    • Lung cancer Family    • Uterine cancer Family    • No Known Problems Maternal Aunt    • No Known Problems Paternal Aunt    • No Known Problems Paternal Uncle    • No Known Problems Maternal Grandmother    • No Known Problems Maternal Grandfather    • No Known Problems Paternal Grandmother    • No Known Problems Paternal Grandfather    • ADD / ADHD Neg Hx    • Anesthesia problems Neg Hx    • Clotting disorder Neg Hx    • Collagen disease Neg Hx    • Diabetes Neg Hx    • Dislocations Neg Hx    • Learning disabilities Neg Hx    • Neurological problems Neg Hx    • Osteoporosis Neg Hx    • Rheumatologic disease Neg Hx    • Scoliosis Neg Hx    • Vascular Disease Neg Hx        Allergies   Allergen Reactions   • Azithromycin GI Intolerance       Current Outpatient Medications:   •  folic acid (FOLVITE) 1 mg tablet, Take 3 tablets (3 mg total) by mouth daily, Disp: 270 tablet, Rfl: 3  •  hydroxychloroquine (PLAQUENIL) 200 mg tablet, Take 1 tablet (200 mg total) by mouth 2 (two) times a day with meals, Disp: 180 tablet, Rfl: 1  •  methotrexate 2 5 mg tablet, Take 8 tabs once weekly  , Disp: 96 tablet, Rfl: 1  •  albuterol (PROVENTIL HFA,VENTOLIN HFA) 90 mcg/act inhaler, INHALE TWO PUFFS EVERY 6 (SIX) HOURS AS NEEDED FOR WHEEZING, Disp: 18 g, Rfl: 4  •  Biotin 10 MG TABS, Take by mouth, Disp: , Rfl:   •  cholecalciferol (VITAMIN D3) 1,000 units tablet, Take 1,000 Units by mouth daily 5000 unit daily , Disp: , Rfl:   •  diazepam (VALIUM) 5 mg tablet, , Disp: , Rfl:   •  fluticasone (FLOVENT HFA) 110 MCG/ACT inhaler, Inhale 1 puff 2 (two) times a day Rinse mouth after use   (Patient taking differently: Inhale 1 puff 2 (two) times a day as needed Rinse mouth after use ), Disp: 1 Inhaler, Rfl: 0  •  gabapentin (NEURONTIN) 100 mg capsule, Take 1 capsule (100 mg total) by mouth daily, Disp: 30 capsule, Rfl: 0  •  gabapentin (NEURONTIN) 600 MG tablet, Take 1 tablet (600 mg total) by mouth 2 (two) times a day for 30 days (Patient taking differently: Take 600 mg by mouth daily at bedtime), Disp: 60 tablet, Rfl: 0  •  ibuprofen (MOTRIN) 600 mg tablet, Take 1 tablet (600 mg total) by mouth every 8 (eight) hours as needed for mild pain, Disp: 30 tablet, Rfl: 0  •  meclizine (ANTIVERT) 25 mg tablet, Take 1 tablet (25 mg total) by mouth every 12 (twelve) hours as needed for dizziness or nausea, Disp: 60 tablet, Rfl: 0  •  midodrine (PROAMATINE) 2 5 mg tablet, TAKE 1 TABLET (2 5 MG TOTAL) BY MOUTH THREE (THREE) TIMES A DAY, Disp: 90 tablet, Rfl: 1  •  naproxen (NAPROSYN) 500 mg tablet, take 1 tablet by mouth twice a day with MEALS (Patient taking differently: as needed), Disp: 60 tablet, Rfl: 0  •  Omega-3 Fatty Acids (FISH OIL) 1,000 mg, Take 1,000 mg by mouth daily, Disp: , Rfl:   •  omeprazole (PriLOSEC) 20 mg delayed release capsule, take 1 capsule by mouth once daily (Patient taking differently: daily as needed), Disp: 30 capsule, Rfl: 0  •  sodium chloride (OCEAN) 0 65 % nasal spray, 1 spray into each nostril, Disp: , Rfl:   •  SUMAtriptan (IMITREX) 100 mg tablet, TAKE 1 TABLET (100 MG TOTAL) BY MOUTH ONCE AS NEEDED FOR MIGRAINE FOR UP TO 1 DOSE TAKE WHEN EXPERI*, Disp: 9 tablet, Rfl: 0      Objective:    Vitals:    02/16/23 1015   BP: 104/67   BP Location: Left arm   Patient Position: Sitting   Cuff Size: Adult   Pulse: 67   Weight: 70 9 kg (156 lb 3 2 oz)   Height: 5' 7" (1 702 m)       Physical Exam  General: Well appearing, well nourished, in no distress  Oriented x 3, normal mood and affect  Ambulating without difficulty  Skin: Good turgor, no rash, unusual bruising or prominent lesions  Hair: Hair thinning noted  Nails: Normal color, no deformities  HEENT:  Head: Normocephalic, atraumatic  Eyes: Conjunctiva clear, sclera non-icteric, EOM intact  Extremities: No amputations or deformities, cyanosis, edema  Neurologic: Alert and oriented  No focal neurological deficits appreciated  Psychiatric: Normal mood and affect  GIAN Monterroso    Rheumatology

## 2023-02-16 ENCOUNTER — OFFICE VISIT (OUTPATIENT)
Dept: RHEUMATOLOGY | Facility: CLINIC | Age: 48
End: 2023-02-16

## 2023-02-16 VITALS
WEIGHT: 156.2 LBS | DIASTOLIC BLOOD PRESSURE: 67 MMHG | BODY MASS INDEX: 24.52 KG/M2 | SYSTOLIC BLOOD PRESSURE: 104 MMHG | HEIGHT: 67 IN | HEART RATE: 67 BPM

## 2023-02-16 DIAGNOSIS — M79.7 FIBROMYALGIA: ICD-10-CM

## 2023-02-16 DIAGNOSIS — Z79.631 METHOTREXATE, LONG TERM, CURRENT USE: ICD-10-CM

## 2023-02-16 DIAGNOSIS — M47.816 OSTEOARTHRITIS OF LUMBAR SPINE, UNSPECIFIED SPINAL OSTEOARTHRITIS COMPLICATION STATUS: ICD-10-CM

## 2023-02-16 DIAGNOSIS — M47.812 OSTEOARTHRITIS OF CERVICAL SPINE, UNSPECIFIED SPINAL OSTEOARTHRITIS COMPLICATION STATUS: ICD-10-CM

## 2023-02-16 DIAGNOSIS — M32.9 SYSTEMIC LUPUS ERYTHEMATOSUS, UNSPECIFIED SLE TYPE, UNSPECIFIED ORGAN INVOLVEMENT STATUS (HCC): Primary | ICD-10-CM

## 2023-02-16 DIAGNOSIS — Z79.899 LONG-TERM USE OF PLAQUENIL: ICD-10-CM

## 2023-02-16 DIAGNOSIS — M72.2 PLANTAR FASCIITIS OF RIGHT FOOT: ICD-10-CM

## 2023-02-16 RX ORDER — HYDROXYCHLOROQUINE SULFATE 200 MG/1
200 TABLET, FILM COATED ORAL 2 TIMES DAILY WITH MEALS
Qty: 180 TABLET | Refills: 1 | Status: SHIPPED | OUTPATIENT
Start: 2023-02-16 | End: 2023-08-15

## 2023-02-16 RX ORDER — FOLIC ACID 1 MG/1
3 TABLET ORAL DAILY
Qty: 270 TABLET | Refills: 3 | Status: SHIPPED | OUTPATIENT
Start: 2023-02-16

## 2023-02-21 RX ORDER — SODIUM CHLORIDE, SODIUM LACTATE, POTASSIUM CHLORIDE, CALCIUM CHLORIDE 600; 310; 30; 20 MG/100ML; MG/100ML; MG/100ML; MG/100ML
75 INJECTION, SOLUTION INTRAVENOUS CONTINUOUS
Status: CANCELLED | OUTPATIENT
Start: 2023-02-21

## 2023-02-22 ENCOUNTER — HOSPITAL ENCOUNTER (OUTPATIENT)
Dept: GASTROENTEROLOGY | Facility: AMBULARY SURGERY CENTER | Age: 48
Setting detail: OUTPATIENT SURGERY
Discharge: HOME | End: 2023-02-22
Attending: INTERNAL MEDICINE

## 2023-02-22 ENCOUNTER — ANESTHESIA EVENT (OUTPATIENT)
Dept: GASTROENTEROLOGY | Facility: AMBULARY SURGERY CENTER | Age: 48
End: 2023-02-22

## 2023-02-22 ENCOUNTER — ANESTHESIA (OUTPATIENT)
Dept: GASTROENTEROLOGY | Facility: AMBULARY SURGERY CENTER | Age: 48
End: 2023-02-22

## 2023-02-22 VITALS
DIASTOLIC BLOOD PRESSURE: 56 MMHG | TEMPERATURE: 97.6 F | HEIGHT: 67 IN | SYSTOLIC BLOOD PRESSURE: 101 MMHG | RESPIRATION RATE: 18 BRPM | WEIGHT: 156.31 LBS | HEART RATE: 62 BPM | BODY MASS INDEX: 24.53 KG/M2 | OXYGEN SATURATION: 100 %

## 2023-02-22 DIAGNOSIS — Z12.11 SCREENING FOR COLON CANCER: ICD-10-CM

## 2023-02-22 PROBLEM — K21.9 GASTROESOPHAGEAL REFLUX DISEASE: Status: ACTIVE | Noted: 2023-02-22

## 2023-02-22 PROBLEM — Z98.890 PONV (POSTOPERATIVE NAUSEA AND VOMITING): Status: ACTIVE | Noted: 2023-02-22

## 2023-02-22 PROBLEM — M32.9 LUPUS (HCC): Status: ACTIVE | Noted: 2018-05-24

## 2023-02-22 PROBLEM — IMO0002 LUPUS: Status: ACTIVE | Noted: 2018-05-24

## 2023-02-22 PROBLEM — R11.2 PONV (POSTOPERATIVE NAUSEA AND VOMITING): Status: ACTIVE | Noted: 2023-02-22

## 2023-02-22 RX ORDER — SODIUM CHLORIDE, SODIUM LACTATE, POTASSIUM CHLORIDE, CALCIUM CHLORIDE 600; 310; 30; 20 MG/100ML; MG/100ML; MG/100ML; MG/100ML
75 INJECTION, SOLUTION INTRAVENOUS CONTINUOUS
Status: DISCONTINUED | OUTPATIENT
Start: 2023-02-22 | End: 2023-02-26 | Stop reason: HOSPADM

## 2023-02-22 RX ORDER — PHENYLEPHRINE HYDROCHLORIDE 10 MG/ML
INJECTION INTRAVENOUS AS NEEDED
Status: DISCONTINUED | OUTPATIENT
Start: 2023-02-22 | End: 2023-02-22

## 2023-02-22 RX ORDER — PROPOFOL 10 MG/ML
INJECTION, EMULSION INTRAVENOUS AS NEEDED
Status: DISCONTINUED | OUTPATIENT
Start: 2023-02-22 | End: 2023-02-22

## 2023-02-22 RX ORDER — PROPOFOL 10 MG/ML
INJECTION, EMULSION INTRAVENOUS CONTINUOUS PRN
Status: DISCONTINUED | OUTPATIENT
Start: 2023-02-22 | End: 2023-02-22

## 2023-02-22 RX ADMIN — SODIUM CHLORIDE, SODIUM LACTATE, POTASSIUM CHLORIDE, AND CALCIUM CHLORIDE 75 ML/HR: .6; .31; .03; .02 INJECTION, SOLUTION INTRAVENOUS at 10:57

## 2023-02-22 RX ADMIN — PHENYLEPHRINE HYDROCHLORIDE 100 MCG: 10 INJECTION INTRAVENOUS at 11:56

## 2023-02-22 RX ADMIN — PROPOFOL 100 MCG/KG/MIN: 10 INJECTION, EMULSION INTRAVENOUS at 11:53

## 2023-02-22 RX ADMIN — PROPOFOL 100 MG: 10 INJECTION, EMULSION INTRAVENOUS at 11:53

## 2023-02-22 NOTE — H&P
History and Physical - [facility]  Shaquille Taylor 52 y o  female MRN: [id number]                  HPI: Shaquille Taylor is a 52y o  year old female who presents for open access screening colonoscopy  No family history of colon cancer  She has 2 C-sections in the past       REVIEW OF SYSTEMS: Per the HPI, and otherwise unremarkable  Historical Information   Past Medical History:   Diagnosis Date   • Acquired ankle/foot deformity     last assessed: 2017   • Anxiety    • Chronic pain disorder     right foot plantar   • Dermatomycosis 2008   • Dysfunctional uterine bleeding     Last assessed: 2018   • Headache    • Lupus ([address] )    • Mass of knee     last assessed: 2014   • Mild acid reflux    • Paronychia of finger     last assessed: 2013   • Paronychia of toe     last asssessed: 2016   right foot   • Patellofemoral dysfunction     last assessed: 3/21/2014   • PCOS (polycystic ovarian syndrome) 12/15/2007   • Pes planus, congenital     last assessed: 2017   • Plantar fasciitis of right foot    • Plantar fibromatosis     last assessed: 2017   • PONV (postoperative nausea and vomiting)    • Trichomoniasis     last assessed: 2015   • Vaginal candidiasis     last assessed: 2016   • Varicose veins of lower extremity     last assessed: 3/5/2014   • Vertigo     last assessed: 3/5/2014   • Viral gastroenteritis     last assessed: 2015     Past Surgical History:   Procedure Laterality Date   • BACK SURGERY      discectomy lumbar   •  SECTION      x2 bikini   • PILONIDAL CYST EXCISION     • [place] HYSTEROSCOPY BX ENDOMETRIUM&/POLYPC W/WO D&C N/A 10/18/2017    Procedure: HYSTEROSCOPY AND FRACTIONAL DILATATION AND CURRETTAGE;  Surgeon: Helder Medina MD;  Location: [facility] MAIN OR;  Service: Gynecology   • TUBAL LIGATION     • WISDOM TOOTH EXTRACTION       Social History   Social History     Substance and Sexual Activity   Alcohol Use Yes Comment: occ     Social History     Substance and Sexual Activity   Drug Use No     Social History     Tobacco Use   Smoking Status Former   • Packs/day: 0 50   • Years: 20 00   • Pack years: 10 00   • Types: Cigarettes   • Quit date:    • Years since quittin 1   Smokeless Tobacco Never     Family History   Problem Relation Age of Onset   • Cancer Mother         skin , gallbladder   • Liver disease Mother    • Hypertension Father    • Heart disease Sister    • Cancer Brother         skin   • No Known Problems Daughter    • No Known Problems Son    • No Known Problems Son    • Colon cancer Maternal Uncle    • Arthritis Family    • Lung cancer Family    • Uterine cancer Family    • No Known Problems Maternal Aunt    • No Known Problems Paternal Aunt    • No Known Problems Paternal Uncle    • No Known Problems Maternal Grandmother    • No Known Problems Maternal Grandfather    • No Known Problems Paternal Grandmother    • No Known Problems Paternal Grandfather    • ADD / ADHD Neg Hx    • Anesthesia problems Neg Hx    • Clotting disorder Neg Hx    • Collagen disease Neg Hx    • Diabetes Neg Hx    • Dislocations Neg Hx    • Learning disabilities Neg Hx    • Neurological problems Neg Hx    • Osteoporosis Neg Hx    • Rheumatologic disease Neg Hx    • Scoliosis Neg Hx    • Vascular Disease Neg Hx        Meds/Allergies       Current Outpatient Medications:   •  albuterol (PROVENTIL HFA,VENTOLIN HFA) 90 mcg/act inhaler  •  Biotin 10 MG TABS  •  cholecalciferol (VITAMIN D3) 1,000 units tablet  •  fluticasone (FLOVENT HFA) 110 MCG/ACT inhaler  •  folic acid (FOLVITE) 1 mg tablet  •  hydroxychloroquine (PLAQUENIL) 200 mg tablet  •  Omega-3 Fatty Acids (FISH OIL) 1,000 mg  •  sodium chloride (OCEAN) 0 65 % nasal spray  •  SUMAtriptan (IMITREX) 100 mg tablet  •  diazepam (VALIUM) 5 mg tablet  •  gabapentin (NEURONTIN) 100 mg capsule  •  gabapentin (NEURONTIN) 600 MG tablet  •  ibuprofen (MOTRIN) 600 mg tablet  • meclizine (ANTIVERT) 25 mg tablet  •  methotrexate 2 5 mg tablet  •  midodrine (PROAMATINE) 2 5 mg tablet  •  naproxen (NAPROSYN) 500 mg tablet  •  omeprazole (PriLOSEC) 20 mg delayed release capsule    Current Facility-Administered Medications:   •  lactated ringers infusion, 75 mL/hr, Intravenous, Continuous, Continue from Pre-op at 02/22/23 1103    Allergies   Allergen Reactions   • Azithromycin GI Intolerance       Objective     BP 98/55   Pulse 65   Temp 97 6 °F (36 4 °C) (Skin)   Resp 16   Ht 5' 7" (1 702 m)   Wt 70 9 kg (156 lb 4 9 oz)   LMP 02/08/2023 (Approximate)   SpO2 100%   BMI 24 48 kg/m²       PHYSICAL EXAM    Gen: NAD  Head: NCAT  CV: RRR  CHEST: Clear  ABD: soft, NT/ND  EXT: no edema      ASSESSMENT/PLAN:  This is a 52y o  year old female here for colonoscopy, and she is stable and optimized for her procedure

## 2023-02-22 NOTE — ANESTHESIA POSTPROCEDURE EVALUATION
Post-Op Assessment Note    CV Status:  Stable  Pain Score: 0    Pain management: adequate     Mental Status:  Sleepy and arousable   Hydration Status:  Euvolemic and stable   PONV Controlled:  Controlled   Airway Patency:  Patent      Post Op Vitals Reviewed: Yes      Staff: CRNA, Anesthesiologist   Comments: Report given to recovering RN, VSS, Pt resting comfortably        There were no known notable events for this encounter      BP 90/50 (02/22/23 1212)    Temp      Pulse 70 (02/22/23 1212)   Resp 16 (02/22/23 1212)    SpO2   100

## 2023-02-22 NOTE — ANESTHESIA PREPROCEDURE EVALUATION
Procedure:  COLONOSCOPY    Relevant Problems   ANESTHESIA   (+) PONV (postoperative nausea and vomiting)      CARDIO   (+) Common migraine without aura      GI/HEPATIC   (+) Gastroesophageal reflux disease      NEURO/PSYCH   (+) Common migraine without aura   (+) Generalized anxiety disorder      Other   (+) Lupus (HCC)   (+) S/P tubal ligation   (+) Vertigo        Physical Exam    Airway    Mallampati score: II  TM Distance: >3 FB  Neck ROM: full     Dental       Cardiovascular  Rhythm: regular, Rate: normal,     Pulmonary  Breath sounds clear to auscultation,     Other Findings        Anesthesia Plan  ASA Score- 2     Anesthesia Type- IV sedation with anesthesia with ASA Monitors  Additional Monitors:   Airway Plan:           Plan Factors-    Chart reviewed  Patient is not a current smoker  Induction- intravenous  Postoperative Plan-     Informed Consent- Anesthetic plan and risks discussed with patient  I personally reviewed this patient with the CRNA  Discussed and agreed on the Anesthesia Plan with the CRNA  Prasanth Ruiz

## 2023-02-28 ENCOUNTER — HOSPITAL ENCOUNTER (OUTPATIENT)
Dept: RADIOLOGY | Facility: HOSPITAL | Age: 48
Discharge: HOME | End: 2023-02-28
Attending: FAMILY MEDICINE

## 2023-02-28 VITALS — WEIGHT: 152 LBS | HEIGHT: 67 IN | BODY MASS INDEX: 23.86 KG/M2

## 2023-02-28 DIAGNOSIS — Z12.31 ENCOUNTER FOR SCREENING MAMMOGRAM FOR BREAST CANCER: ICD-10-CM

## 2023-03-01 ENCOUNTER — OFFICE VISIT (OUTPATIENT)
Dept: AUDIOLOGY | Facility: CLINIC | Age: 48
End: 2023-03-01

## 2023-03-01 DIAGNOSIS — H90.3 SENSORINEURAL HEARING LOSS (SNHL), BILATERAL: Primary | ICD-10-CM

## 2023-03-02 NOTE — PROGRESS NOTES
[facility] AUDIOLOGY HEARING AID CHECK    Fide Gotti was seen today (3/1/2023) for a hearing aid check of her bilateral hearing aids  Today, Ms Hermelindo Garcia reported that she was happy with the output of the hearing aids and the noticed a significant QOL improvement, but that her left aid was uncomfortable  She has not worn th hearing aid for several days because of this  Otoscopy revealed clear canals  no contact sores or irritation seen          Device Information    Hearing Aid Fitting Date: 2/8/2023       Left Device Right Device   Hearing Aid Make: Akua Mata   Hearing Aid Model: MORE 1 miniRITE MORE 1 miniRITE   Serial Number: [id number]   Repair Warranty Expiration Date: 02/11/2026 02/11/2026   Loss/Damage Warranty Expiration Date:  02/11/2026 02/11/2026    Length: 1 1    Output: 60 60   Wax System: Pro Wax miniFIT Pro Wax miniFIT   Dome Size/Style: 6mm open 6mm open   Battery: Lithium-ion Rechargeable Lithium-ion Rechargeable   [employer] Serial Number: N/A N/A   Nilesh Cochran Date:  N/A N/A      Serial Number: [id number]  Accessories: N/A    Visual inspection of the device(s) revealed no noticeable damage or defects  A listening check revealed good sound quality from the device(s)  Changes to Device(s)    • 8mm domes switched for 6mm domes   • datalogging revealed 3h of daily usage  Recommendations & Follow-up    Hearing aids(s) are in good working condition  Ms Hermelindo Garcia stated that she has no further questions with her hearing aid(s) and does not feed that any other adjustments are needed at this time  She will follow-up in 2 weeks, sooner if other problems/concerns arise  REM should be performed at this visit  It was a pleasure working with Ms Hermelindo Garcia today  She was encouraged to contact the clinic with any further questions in the meantime  Karrie Fowler    Clinical Audiologist    [id number] [facility]  [id number]  [place] [id number]

## 2023-03-14 ENCOUNTER — CONSULT (OUTPATIENT)
Dept: PAIN MEDICINE | Facility: CLINIC | Age: 48
End: 2023-03-14

## 2023-03-14 VITALS
TEMPERATURE: 98.2 F | BODY MASS INDEX: 24.43 KG/M2 | SYSTOLIC BLOOD PRESSURE: 101 MMHG | HEART RATE: 68 BPM | WEIGHT: 156 LBS | DIASTOLIC BLOOD PRESSURE: 65 MMHG

## 2023-03-14 DIAGNOSIS — G89.4 CHRONIC PAIN SYNDROME: ICD-10-CM

## 2023-03-14 DIAGNOSIS — M51.16 LUMBAR DISC DISEASE WITH RADICULOPATHY: ICD-10-CM

## 2023-03-14 DIAGNOSIS — M51.26 LUMBAR DISC HERNIATION: ICD-10-CM

## 2023-03-14 DIAGNOSIS — Z98.890 HISTORY OF LUMBAR SURGERY: Primary | ICD-10-CM

## 2023-03-14 NOTE — PROGRESS NOTES
Assessment:  1  History of lumbar surgery    2  Chronic pain syndrome    3  Lumbar disc disease with radiculopathy    4  Lumbar disc herniation        Plan:  Ms Candis Salazar is a pleasant 24-year-old female past medical history of L4-L5 discectomy with foraminotomy in 2015 with multiple interventional approaches including L4-L5 transforaminal epidural steroid injections in 2014 as well as 2021 and 2022 most recently with Dr Lonnie Beverly all with no significant improvements in her pain  Extensive conversation regarding interventional, medication and therapeutic approaches were discussed  Patient has exhausted a multitude of options including injections, physical therapy, medication management  At this time we did discuss neuromodulation with a spinal cord stimulator through  and she is interested to learn more  Educational information has been provided and she wishes to discuss with her family and friends prior to the proceeding  We will await patient's decision prior to placing any orders  History of Present Illness:    Tyrel Lerner is a 52 y o  female who presents to [facility] for initial evaluation of the above stated pain complaints  The patient has a past medical and chronic pain history as outlined in the assessment section  Patient presents to [facility] for initial evaluation regarding several years duration of low back pain with radiating symptoms into the bilateral lower extremities  Denies any significant inciting event or recent trauma  Today reports moderate to severe pain rated 8 out of 10 and interfering with daily therapies  Pain is constant 100% of the time that is present throughout the day and night  Describes symptoms as burning, sharp, dull aching  Also reports lower extremity weakness but denies falls  Does not use any durable medical equipment for ambulation    Symptoms are worse with lying down, standing, bending, sitting, walking, menstruation  Has had no significant relief with physical therapy, exercise but reports excellent relief from previous surgery  Denies smoking, marijuana use  Admits to occasional alcohol use  Has previously tried Percocet, Vicodin, Tylenol, Motrin, Mobic with varying degrees of relief  Presents today for initial evaluation  Review of Systems:    Review of Systems   Constitutional: Positive for chills  Negative for fatigue  HENT: Positive for hearing loss  Negative for ear pain, mouth sores and sinus pressure  Eyes: Positive for visual disturbance  Negative for pain and redness  Respiratory: Negative for shortness of breath and wheezing  Cardiovascular: Negative for chest pain and palpitations  Gastrointestinal: Positive for nausea  Negative for abdominal pain  Endocrine: Negative for polyphagia  Musculoskeletal: Positive for back pain, gait problem and joint swelling  Negative for arthralgias and neck pain  Joint pain,in back and down both legs, Decreased ROM   Skin: Positive for rash  Negative for wound  Neurological: Positive for dizziness, weakness, numbness and headaches  Negative for seizures  Psychiatric/Behavioral: Positive for sleep disturbance  Negative for dysphoric mood  The patient is nervous/anxious  Past Medical History:   Diagnosis Date   • Acquired ankle/foot deformity     last assessed: 8/29/2017   • Anxiety    • Chronic pain disorder     right foot plantar   • 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   • Dysfunctional uterine bleeding     Last assessed: 1/9/2018   • Headache    • Lupus ([facility] [address] )    • Mass of knee     last assessed: 5/7/2014   • Mild acid reflux    • Paronychia of finger     last assessed: 11/20/2013   • Paronychia of toe     last asssessed: 9/28/2016   right foot   • Patellofemoral dysfunction     last assessed: 3/21/2014   • PCOS (polycystic ovarian syndrome) 12/15/2007   • Pes planus, congenital     last assessed: 8/29/2017   • Plantar fasciitis of right foot    • Plantar fibromatosis     last assessed: 2017   • PONV (postoperative nausea and vomiting)    • Trichomoniasis     last assessed: 2015   • Vaginal candidiasis     last assessed: 2016   • Varicose veins of lower extremity     last assessed: 3/5/2014   • Vertigo     last assessed: 3/5/2014   • Viral gastroenteritis     last assessed: 2015       Past Surgical History:   Procedure Laterality Date   • BACK SURGERY      discectomy lumbar   •  SECTION      x2 bikini   • PILONIDAL CYST EXCISION     • IN HYSTEROSCOPY BX ENDOMETRIUM&/POLYPC W/WO D&C N/A 10/18/2017    Procedure: HYSTEROSCOPY AND FRACTIONAL DILATATION AND CURRETTAGE;  Surgeon: Robert Moreno MD;  Location: [facility] MAIN OR;  Service: Gynecology   • TUBAL LIGATION     • WISDOM TOOTH EXTRACTION         Family History   Problem Relation Age of Onset   • Cancer Mother         skin , gallbladder   • Liver disease Mother    • Hypertension Father    • Heart disease Sister    • No Known Problems Daughter    • No Known Problems Maternal Grandmother    • No Known Problems Maternal Grandfather    • No Known Problems Paternal Grandmother    • No Known Problems Paternal Grandfather    • Cancer Brother         skin   • No Known Problems Son    • No Known Problems Son    • No Known Problems Maternal Aunt    • Colon cancer Maternal Uncle    • No Known Problems Paternal Aunt    • No Known Problems Paternal Uncle    • Arthritis Family    • Lung cancer Family    • Uterine cancer Family    • Breast cancer Cousin 28   • ADD / ADHD Neg Hx    • Anesthesia problems Neg Hx    • Clotting disorder Neg Hx    • Collagen disease Neg Hx    • Diabetes Neg Hx    • Dislocations Neg Hx    • Learning disabilities Neg Hx    • Neurological problems Neg Hx    • Osteoporosis Neg Hx    • Rheumatologic disease Neg Hx    • Scoliosis Neg Hx    • Vascular Disease Neg Hx        Social History     Occupational History   • Not on file   Tobacco Use • Smoking status: Former     Packs/day: 0 50     Years: 20 00     Pack years: 10 00     Types: Cigarettes     Quit date:      Years since quittin 2   • Smokeless tobacco: Never   Vaping Use   • Vaping Use: Never used   Substance and Sexual Activity   • Alcohol use: Yes     Comment: occ   • Drug use: No   • Sexual activity: Not on file         Current Outpatient Medications:   •  albuterol (PROVENTIL HFA,VENTOLIN HFA) 90 mcg/act inhaler, INHALE TWO PUFFS EVERY 6 (SIX) HOURS AS NEEDED FOR WHEEZING, Disp: 18 g, Rfl: 4  •  Biotin 10 MG TABS, Take by mouth, Disp: , Rfl:   •  cholecalciferol (VITAMIN D3) 1,000 units tablet, Take 1,000 Units by mouth daily 5000 unit daily , Disp: , Rfl:   •  fluticasone (FLOVENT HFA) 110 MCG/ACT inhaler, Inhale 1 puff 2 (two) times a day Rinse mouth after use  (Patient taking differently: Inhale 1 puff 2 (two) times a day as needed Rinse mouth after use ), Disp: 1 Inhaler, Rfl: 0  •  folic acid (FOLVITE) 1 mg tablet, Take 3 tablets (3 mg total) by mouth daily, Disp: 270 tablet, Rfl: 3  •  gabapentin (NEURONTIN) 100 mg capsule, Take 1 capsule (100 mg total) by mouth daily, Disp: 30 capsule, Rfl: 0  •  hydroxychloroquine (PLAQUENIL) 200 mg tablet, Take 1 tablet (200 mg total) by mouth 2 (two) times a day with meals, Disp: 180 tablet, Rfl: 1  •  ibuprofen (MOTRIN) 600 mg tablet, Take 1 tablet (600 mg total) by mouth every 8 (eight) hours as needed for mild pain, Disp: 30 tablet, Rfl: 0  •  meclizine (ANTIVERT) 25 mg tablet, Take 1 tablet (25 mg total) by mouth every 12 (twelve) hours as needed for dizziness or nausea, Disp: 60 tablet, Rfl: 0  •  methotrexate 2 5 mg tablet, Take 8 tabs once weekly  , Disp: 96 tablet, Rfl: 1  •  naproxen (NAPROSYN) 500 mg tablet, take 1 tablet by mouth twice a day with MEALS (Patient taking differently: as needed), Disp: 60 tablet, Rfl: 0  •  Omega-3 Fatty Acids (FISH OIL) 1,000 mg, Take 1,000 mg by mouth daily, Disp: , Rfl:   •  omeprazole (PriLOSEC) 20 mg delayed release capsule, take 1 capsule by mouth once daily (Patient taking differently: daily as needed), Disp: 30 capsule, Rfl: 0  •  sodium chloride (OCEAN) 0 65 % nasal spray, 1 spray into each nostril, Disp: , Rfl:   •  SUMAtriptan (IMITREX) 100 mg tablet, TAKE 1 TABLET (100 MG TOTAL) BY MOUTH ONCE AS NEEDED FOR MIGRAINE FOR UP TO 1 DOSE TAKE WHEN EXPERI*, Disp: 9 tablet, Rfl: 0  •  diazepam (VALIUM) 5 mg tablet, , Disp: , Rfl:   •  gabapentin (NEURONTIN) 600 MG tablet, Take 1 tablet (600 mg total) by mouth 2 (two) times a day for 30 days (Patient taking differently: Take 600 mg by mouth daily at bedtime), Disp: 60 tablet, Rfl: 0  •  midodrine (PROAMATINE) 2 5 mg tablet, TAKE 1 TABLET (2 5 MG TOTAL) BY MOUTH THREE (THREE) TIMES A DAY (Patient not taking: Reported on 3/14/2023), Disp: 90 tablet, Rfl: 1    Allergies   Allergen Reactions   • Azithromycin GI Intolerance       Physical Exam:    /65   Pulse 68   Temp 98 2 °F (36 8 °C)   Wt 70 8 kg (156 lb)   BMI 24 43 kg/m²     Constitutional: normal, well developed, well nourished, alert, in no distress and non-toxic and no overt pain behavior    Eyes: anicteric  HEENT: grossly intact  Neck: supple, symmetric, trachea midline and no masses   Pulmonary:even and unlabored  Cardiovascular:No edema or pitting edema present  Skin:Normal without rashes or lesions and well hydrated  Psychiatric:Mood and affect appropriate  Neurologic:Cranial Nerves II-XII grossly intact   Musculoskeletal:antalgic, tenderness to palpation bilateral lumbar paraspinals, decreased active and passive range of motion with lumbar flexion and extension Laveta pain, MMT 5 out of 5 bilateral lower extremities, positive straight leg raise in the seated position with radicular pain into the left leg, DTRs within normal limits  [address]  Outside Information     MRI LUMBAR SPINE WO CONTRAST    Anatomical Region Laterality Modality   L-spine -- Magnetic Resonance   MRSPINE -- --     Impression    IMPRESSION:     No prior operative report or imaging is available for comparison at the time of   dictation  1  Postsurgical changes of the lower lumbar spine at L4-L5  Somewhat limited   differentiation between postsurgical changes/granulation tissue and   recurrent/new disc and the absence of prior comparison imaging/intravenous   contrast  Findings as follows:     L3-L4: Minimal disc bulge  Disc approximates the traversing right and left L4   nerve roots in the thecal sac with equivocal abutment  Di Hoist L4-L5: Small area of low signal intensity posterior to the disc space and   eccentric towards the left paracentral region with mild superior migration   measuring up to 10 mm  Somewhat limited differentiation between postsurgical   changes/epidural fibrosis/disc in the absence of intravenous contrast    Equivocal/near abutment of the traversing left L5 nerve root in the thecal sac  Thecal sac is patent   Mild to moderate narrowing of left neural foramen more   pronounced at the entrance zone (series 7/3 image 4)  L5-S1: Moderate broad-based disc protrusion effacing the ventral CSF space  Minimal/borderline narrowing of the right neural foramen, and the left neural   foramen is in the lower range of normal in size  2  Mild facet arthropathy of the lower lumbar spine at L4-L5  3  Minimal atrophy/fatty infiltration of the paraspinal muscles at the lower   lumbar spine               Workstation:[id number]  Narrative    MRI of the lumbar spine without contrast     HISTORY: Low back pain, symptoms persist with > 6wks conservative treatment;   history of prior surgery in 2015; right leg pain radiating down to the knee;   symptoms of left leg pain radiating to the foot     TECHNIQUE: Multiplanar and multisequence MR images of the lumbar spine were   obtained at 1 5 [facility] without intravenous contrast      CONTRAST: None     COMPARISON: Radiograph of the lumbar spine dated 8/13/2021     FINDINGS:   Minimal to mild heterogeneity of marrow signal noted throughout the lumbar spine   on the T1-weighted images  Diffuse dropout of the background signal seen on the   opposed phase images, favored to represent normal variant signal changes/red   marrow  No geographic marrow edema/compression fracture of the lumbar vertebral   bodies  Postsurgical changes of the lower lumbar spine and at the level of L4-L5   on the left suggestive of prior left-sided hemilaminotomy/possible discectomy  Slightly diminished disc height at L4-L5 and L5-S1  Minimal endplate marrow   signal changes at the inferior endplate of N2/[id number] endplate of S1 to the   right and left of midline, favored to represent fibrovascular type endplate   degenerative changes  No focal marrow edema or cortical deformity of the   posterior elements  Mild facet arthropathy lower lumbar spine at L4-L5  Conus is located at the level of L1  No appreciable mass effect on distal   cord/conus  Minimal areas of punctate susceptibility artifact in the posterior midline soft   tissues at the level of the previous surgery  Minimal atrophy/fatty infiltration   of the paraspinal muscles at the lower lumbar spine  T11-12 and T12-L1: Spinal canal and neural foramina are patent  L1-L2: Minimal disc bulge  L2-L3: Minimal disc bulge  L3-L4: 1 mm of retrolisthesis of L3 on L4 with a minimal disc bulge  Thecal sac   is patent  Disc approximates the traversing right and left L4 nerve roots in the   thecal sac with equivocal abutment  Neural foramina are patent bilaterally  L4-L5: 2 mm retrolisthesis of L4 on L5  Small area of low signal intensity   posterior to the disc space and eccentric towards the left paracentral region   with mild superior migration measuring up to 10 mm   Somewhat limited   differentiation between postsurgical changes/epidural fibrosis/disc in the   absence of intravenous contrast  Equivocal/near abutment of the traversing left   L5 nerve root in the thecal sac  Thecal sac is patent  Right neural foramen is   patent  Mild to moderate narrowing of left neural foramen more pronounced at the   entrance zone (series 7/3 image 4)  L5-S1: Moderate broad-based disc protrusion effacing the ventral CSF space  Thecal sac is in the lower range of normal in size  Minimal/borderline narrowing   of the right neural foramen, and the left neural foramen is in the lower range   of normal in size  Please note that the above numbering system assumes 5 lumbar type vertebral   bodies   Accurate numbering of the vertebral bodies would require imaging of the   entire thoracolumbar spine     Procedure Note    David Stokes MD - 11/21/2021   Formatting of this note might be different from the original    MRI of the lumbar spine without contrast     HISTORY: Low back pain, symptoms persist with > 6wks conservative treatment;   history of prior surgery in 2015; right leg pain radiating down to the knee;   symptoms of left leg pain radiating to the foot     TECHNIQUE: Multiplanar and multisequence MR images of the lumbar spine were   obtained at 1 5 [facility] without intravenous contrast      CONTRAST: None     COMPARISON: Radiograph of the lumbar spine dated 8/13/2021     FINDINGS:   Minimal to mild heterogeneity of marrow signal noted throughout the lumbar spine   on the T1-weighted images  Diffuse dropout of the background signal seen on the   opposed phase images, favored to represent normal variant signal changes/red   marrow  No geographic marrow edema/compression fracture of the lumbar vertebral   bodies  Postsurgical changes of the lower lumbar spine and at the level of L4-L5   on the left suggestive of prior left-sided hemilaminotomy/possible discectomy  Slightly diminished disc height at L4-L5 and L5-S1   Minimal endplate marrow   signal changes at the inferior endplate of H1/DEYKFGRJ endplate of S1 to the   right and left of midline, favored to represent fibrovascular type endplate   degenerative changes  No focal marrow edema or cortical deformity of the   posterior elements  Mild facet arthropathy lower lumbar spine at L4-L5  Conus is located at the level of L1  No appreciable mass effect on distal   cord/conus  Minimal areas of punctate susceptibility artifact in the posterior midline soft   tissues at the level of the previous surgery  Minimal atrophy/fatty infiltration   of the paraspinal muscles at the lower lumbar spine  T11-12 and T12-L1: Spinal canal and neural foramina are patent  L1-L2: Minimal disc bulge  L2-L3: Minimal disc bulge  L3-L4: 1 mm of retrolisthesis of L3 on L4 with a minimal disc bulge  Thecal sac   is patent  Disc approximates the traversing right and left L4 nerve roots in the   thecal sac with equivocal abutment  Neural foramina are patent bilaterally  L4-L5: 2 mm retrolisthesis of L4 on L5  Small area of low signal intensity   posterior to the disc space and eccentric towards the left paracentral region   with mild superior migration measuring up to 10 mm  Somewhat limited   differentiation between postsurgical changes/epidural fibrosis/disc in the   absence of intravenous contrast  Equivocal/near abutment of the traversing left   L5 nerve root in the thecal sac  Thecal sac is patent  Right neural foramen is   patent  Mild to moderate narrowing of left neural foramen more pronounced at the   entrance zone (series 7/3 image 4)  L5-S1: Moderate broad-based disc protrusion effacing the ventral CSF space  Thecal sac is in the lower range of normal in size  Minimal/borderline narrowing   of the right neural foramen, and the left neural foramen is in the lower range   of normal in size       Please note that the above numbering system assumes 5 lumbar type vertebral   bodies   Accurate numbering of the vertebral bodies would require imaging of the   entire thoracolumbar spine    IMPRESSION:   IMPRESSION:     No prior operative report or imaging is available for comparison at the time of   dictation  1  Postsurgical changes of the lower lumbar spine at L4-L5  Somewhat limited   differentiation between postsurgical changes/granulation tissue and   recurrent/new disc and the absence of prior comparison imaging/intravenous   contrast  Findings as follows:     L3-L4: Minimal disc bulge  Disc approximates the traversing right and left L4   nerve roots in the thecal sac with equivocal abutment  Armin Harps L4-L5: Small area of low signal intensity posterior to the disc space and   eccentric towards the left paracentral region with mild superior migration   measuring up to 10 mm  Somewhat limited differentiation between postsurgical   changes/epidural fibrosis/disc in the absence of intravenous contrast    Equivocal/near abutment of the traversing left L5 nerve root in the thecal sac  Thecal sac is patent   Mild to moderate narrowing of left neural foramen more   pronounced at the entrance zone (series 7/3 image 4)  L5-S1: Moderate broad-based disc protrusion effacing the ventral CSF space  Minimal/borderline narrowing of the right neural foramen, and the left neural   foramen is in the lower range of normal in size  2  Mild facet arthropathy of the lower lumbar spine at L4-L5  3  Minimal atrophy/fatty infiltration of the paraspinal muscles at the lower   lumbar spine  Workstation:[id number]  Exam End: 11/19/21 10:00 AM    Specimen Collected: 11/21/21  6:54 AM Last Resulted: 11/21/21  7:04 AM   Received From: [address] [place] [address]  Result Received: 02/08/23  8:25 AM    View Encounter           No orders to display       No orders of the defined types were placed in this encounter

## 2023-03-15 ENCOUNTER — OFFICE VISIT (OUTPATIENT)
Dept: AUDIOLOGY | Facility: CLINIC | Age: 48
End: 2023-03-15

## 2023-03-15 DIAGNOSIS — H90.3 SENSORINEURAL HEARING LOSS (SNHL), BILATERAL: Primary | ICD-10-CM

## 2023-03-15 NOTE — PROGRESS NOTES
[facility] AUDIOLOGY HEARING AID CHECK    Karli Travis was seen today (3/15/2023) for a hearing aid check of her bilateral hearing aids  Today, Ms Yair Harmon reported that she was happy with the output of the hearing aids and the noticed a significant QOL improvement  Both aids are now comfortable- though she was requesting the larger dome size AD due to device migration  Otoscopy revealed clear canals  no contact sores or irritation seen          Device Information    Hearing Aid Fitting Date: 2/8/2023       Left Device Right Device   Hearing Aid Make: Albinawinsome Lockwood   Hearing Aid Model: MORE 1 miniRITE MORE 1 miniRITE   Serial Number: [id number]   Repair Warranty Expiration Date: 02/11/2026 02/11/2026   Loss/Damage Warranty Expiration Date:  02/11/2026 02/11/2026    Length: 1 1    Output: 60 60   Wax System: Pro Wax miniFIT Pro Wax miniFIT   Dome Size/Style: 6mm open 6mm open   Battery: Lithium-ion Rechargeable Lithium-ion Rechargeable   [place] Serial Number: N/A N/A   Patsy Morales Date:  N/A N/A      Serial Number: [id number]  Accessories: N/A    Visual inspection of the device(s) revealed no noticeable damage or defects  A listening check revealed good sound quality from the device(s)  Changes to Device(s)    • 8mm domes replaced AD only  • REM performed  Devices are meeting NAL-NL2 targets AU for 55, 65, 75 dB inputs  Recommendations & Follow-up    Hearing aids(s) are in good working condition  Ms Yair Harmon stated that she has no further questions with her hearing aid(s) and does not feed that any other adjustments are needed at this time  She will follow-up in 3 months, sooner if other problems/concerns arise  REM should be performed at this visit  It was a pleasure working with Ms Yair Harmon today  She was encouraged to contact the clinic with any further questions in the meantime  Karrie Hickman    Clinical Audiologist    [facility]  [address]  [address] [facility] [address]

## 2023-04-28 ENCOUNTER — TELEPHONE (OUTPATIENT)
Dept: OBGYN CLINIC | Facility: HOSPITAL | Age: 48
End: 2023-04-28

## 2023-04-28 ENCOUNTER — TELEPHONE (OUTPATIENT)
Dept: RHEUMATOLOGY | Facility: CLINIC | Age: 48
End: 2023-04-28

## 2023-04-28 NOTE — TELEPHONE ENCOUNTER
Patient was called and told she could take Tylenol 500 to 1000 mg three times daily as needed and Advil 400 mg three times daily as needed for her pain

## 2023-04-28 NOTE — TELEPHONE ENCOUNTER
Please ask her to try a regimen of Tylenol 500-1000 mg three times daily and Advil 400 mg three times daily  Both can be done as needed

## 2023-04-28 NOTE — TELEPHONE ENCOUNTER
Caller: Self    Doctor: Saad Pace    Reason for call: Patient is having increase joint pain in elbow, knees, joints overall  Been increasing over the last week and is difficult to  anything with hands    Please advise    Call back#: [phone]

## 2023-05-08 ENCOUNTER — TELEPHONE (OUTPATIENT)
Dept: OBGYN CLINIC | Facility: HOSPITAL | Age: 48
End: 2023-05-08

## 2023-05-08 NOTE — TELEPHONE ENCOUNTER
Caller: patient    Doctor: Swati Marcum    Reason for call: patient is requesting lab scripts to be mailed home      Call back#: [phone]

## 2023-05-24 ENCOUNTER — OFFICE VISIT (OUTPATIENT)
Dept: FAMILY MEDICINE CLINIC | Facility: CLINIC | Age: 48
End: 2023-05-24

## 2023-05-24 VITALS
OXYGEN SATURATION: 100 % | DIASTOLIC BLOOD PRESSURE: 70 MMHG | BODY MASS INDEX: 24.33 KG/M2 | WEIGHT: 155 LBS | HEART RATE: 74 BPM | RESPIRATION RATE: 16 BRPM | HEIGHT: 67 IN | SYSTOLIC BLOOD PRESSURE: 110 MMHG | TEMPERATURE: 98 F

## 2023-05-24 DIAGNOSIS — Z91.09 ENVIRONMENTAL ALLERGIES: ICD-10-CM

## 2023-05-24 DIAGNOSIS — M32.9 LUPUS (HCC): ICD-10-CM

## 2023-05-24 DIAGNOSIS — J32.9 SINUSITIS, UNSPECIFIED CHRONICITY, UNSPECIFIED LOCATION: Primary | ICD-10-CM

## 2023-05-24 RX ORDER — OXYMETAZOLINE HYDROCHLORIDE 0.05 G/100ML
2 SPRAY NASAL 2 TIMES DAILY
COMMUNITY

## 2023-05-24 RX ORDER — DOXYCYCLINE HYCLATE 100 MG/1
100 CAPSULE ORAL EVERY 12 HOURS SCHEDULED
Qty: 20 CAPSULE | Refills: 0 | Status: SHIPPED | OUTPATIENT
Start: 2023-05-24 | End: 2023-06-03

## 2023-05-24 RX ORDER — ALBUTEROL SULFATE 90 UG/1
1 AEROSOL, METERED RESPIRATORY (INHALATION) EVERY 6 HOURS PRN
Qty: 18 G | Refills: 4 | Status: SHIPPED | OUTPATIENT
Start: 2023-05-24

## 2023-05-24 RX ORDER — METHYLPREDNISOLONE 4 MG/1
TABLET ORAL
Qty: 21 EACH | Refills: 0 | Status: SHIPPED | OUTPATIENT
Start: 2023-05-24

## 2023-05-24 NOTE — PROGRESS NOTES
Dane Clarke 1975 female MRN: [id number]    [facility] PRACTICE OFFICE VISIT  [facility] - [address]      ASSESSMENT/PLAN  Dane Clarke is a 52 y o  female presents to the office for    Diagnoses and all orders for this visit:    Sinusitis, unspecified chronicity, unspecified location  -     methylPREDNISolone 4 MG tablet therapy pack; Use as directed on package  -     doxycycline hyclate (VIBRAMYCIN) 100 mg capsule; Take 1 capsule (100 mg total) by mouth every 12 (twelve) hours for 10 days  -     albuterol (PROVENTIL HFA,VENTOLIN HFA) 90 mcg/act inhaler; Inhale 1 puff every 6 (six) hours as needed for wheezing    Environmental allergies    Lupus ([address] )    Other orders  -     oxymetazoline (Vicks Sinex 12 Hour Decongest) 0 05 % nasal spray; 2 sprays by Each Nare route 2 (two) times a day                Future Appointments   Date Time Provider [facility] [place]   6/6/2023  9:30 AM Dilia Gilbert, AuD  [address] [facility] [address]   8/17/2023 10:00 AM Darell Duran MD [facility]          SUBJECTIVE  CC: Nasal Congestion (Cough)      HPI:  Dane Clarke is a 52 y o  female who presents for     Allergies, that led to congestion that won't improve x 3 weeks does not know if she has an allergy to cats  She is going to be seeing the allergist in a couple weeks  Patient states that she has been trying antihistamines with no improvement     Uncontrolled patient has been trying over-the-counter normal saline with no improvement  Lupus is currently stable  Review of Systems   Constitutional: Positive for fatigue  Negative for activity change, appetite change, chills and fever  HENT: Positive for congestion, sinus pressure and sinus pain  Respiratory: Positive for cough  Negative for chest tightness and shortness of breath  Cardiovascular: Negative for chest pain and leg swelling     Gastrointestinal: Negative for abdominal distention, abdominal pain, constipation, diarrhea, nausea and vomiting  All other systems reviewed and are negative  Historical Information   The patient history was reviewed as follows:  Past Medical History:   Diagnosis Date   • Acquired ankle/foot deformity     last assessed: 8/29/2017   • Anxiety    • Chronic pain disorder     right foot plantar   • 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   • Dysfunctional uterine bleeding     Last assessed: 1/9/2018   • Headache    • Lupus ([address] )    • Mass of knee     last assessed: 5/7/2014   • Mild acid reflux    • Paronychia of finger     last assessed: 11/20/2013   • Paronychia of toe     last asssessed: 9/28/2016   right foot   • Patellofemoral dysfunction     last assessed: 3/21/2014   • PCOS (polycystic ovarian syndrome) 12/15/2007   • Pes planus, congenital     last assessed: 8/29/2017   • Plantar fasciitis of right foot    • Plantar fibromatosis     last assessed: 9/28/2017   • PONV (postoperative nausea and vomiting)    • Trichomoniasis     last assessed: 1/16/2015   • Vaginal candidiasis     last assessed: 11/30/2016   • Varicose veins of lower extremity     last assessed: 3/5/2014   • Vertigo     last assessed: 3/5/2014   • Viral gastroenteritis     last assessed: 6/4/2015         Medications:     Current Outpatient Medications:   •  albuterol (PROVENTIL HFA,VENTOLIN HFA) 90 mcg/act inhaler, Inhale 1 puff every 6 (six) hours as needed for wheezing, Disp: 18 g, Rfl: 4  •  Biotin 10 MG TABS, Take by mouth, Disp: , Rfl:   •  cholecalciferol (VITAMIN D3) 1,000 units tablet, Take 1,000 Units by mouth daily 5000 unit daily , Disp: , Rfl:   •  doxycycline hyclate (VIBRAMYCIN) 100 mg capsule, Take 1 capsule (100 mg total) by mouth every 12 (twelve) hours for 10 days, Disp: 20 capsule, Rfl: 0  •  folic acid (FOLVITE) 1 mg tablet, Take 3 tablets (3 mg total) by mouth daily, Disp: 270 tablet, Rfl: 3  •  hydroxychloroquine (PLAQUENIL) 200 mg tablet, Take 1 tablet (200 mg total) by mouth 2 (two) times a day with "meals, Disp: 180 tablet, Rfl: 1  •  ibuprofen (MOTRIN) 600 mg tablet, Take 1 tablet (600 mg total) by mouth every 8 (eight) hours as needed for mild pain, Disp: 30 tablet, Rfl: 0  •  methotrexate 2 5 mg tablet, Take 8 tabs once weekly  , Disp: 96 tablet, Rfl: 1  •  methylPREDNISolone 4 MG tablet therapy pack, Use as directed on package, Disp: 21 each, Rfl: 0  •  naproxen (NAPROSYN) 500 mg tablet, take 1 tablet by mouth twice a day with MEALS (Patient taking differently: as needed), Disp: 60 tablet, Rfl: 0  •  Omega-3 Fatty Acids (FISH OIL) 1,000 mg, Take 1,000 mg by mouth daily, Disp: , Rfl:   •  omeprazole (PriLOSEC) 20 mg delayed release capsule, take 1 capsule by mouth once daily (Patient taking differently: daily as needed), Disp: 30 capsule, Rfl: 0  •  oxymetazoline (Vicks Sinex 12 Hour Decongest) 0 05 % nasal spray, 2 sprays by Each Nare route 2 (two) times a day, Disp: , Rfl:   •  SUMAtriptan (IMITREX) 100 mg tablet, TAKE 1 TABLET (100 MG TOTAL) BY MOUTH ONCE AS NEEDED FOR MIGRAINE FOR UP TO 1 DOSE TAKE WHEN EXPERI*, Disp: 9 tablet, Rfl: 0  •  diazepam (VALIUM) 5 mg tablet, , Disp: , Rfl:   •  fluticasone (FLOVENT HFA) 110 MCG/ACT inhaler, Inhale 1 puff 2 (two) times a day Rinse mouth after use   (Patient not taking: Reported on 5/24/2023), Disp: 1 Inhaler, Rfl: 0  •  gabapentin (NEURONTIN) 600 MG tablet, Take 1 tablet (600 mg total) by mouth 2 (two) times a day for 30 days (Patient not taking: Reported on 5/24/2023), Disp: 60 tablet, Rfl: 0  •  sodium chloride (OCEAN) 0 65 % nasal spray, 1 spray into each nostril (Patient not taking: Reported on 5/24/2023), Disp: , Rfl:     Allergies   Allergen Reactions   • Azithromycin GI Intolerance       OBJECTIVE  Vitals:   Vitals:    05/24/23 0850   BP: 110/70   BP Location: Right arm   Patient Position: Sitting   Cuff Size: Standard   Pulse: 74   Resp: 16   Temp: 98 °F (36 7 °C)   SpO2: 100%   Weight: 70 3 kg (155 lb)   Height: 5' 7\" (1 702 m)         Physical " Exam  Vitals reviewed  Constitutional:       Appearance: She is well-developed  HENT:      Head: Normocephalic and atraumatic  Right Ear: External ear normal  A middle ear effusion is present  Left Ear: External ear normal  A middle ear effusion is present  Nose: Mucosal edema present  Mouth/Throat:      Pharynx: Uvula midline  Posterior oropharyngeal erythema present  No oropharyngeal exudate  Eyes:      Conjunctiva/sclera: Conjunctivae normal       Pupils: Pupils are equal, round, and reactive to light  Cardiovascular:      Rate and Rhythm: Normal rate and regular rhythm  Heart sounds: Normal heart sounds  No murmur heard  Pulmonary:      Effort: Pulmonary effort is normal  No respiratory distress  Breath sounds: Normal breath sounds  Abdominal:      General: Bowel sounds are normal       Palpations: Abdomen is soft  Tenderness: There is no abdominal tenderness  Musculoskeletal:         General: Normal range of motion  Cervical back: Normal range of motion and neck supple  Skin:     General: Skin is warm and dry  Capillary Refill: Capillary refill takes less than 2 seconds  Neurological:      Mental Status: She is alert and oriented to person, place, and time  Psychiatric:         Behavior: Behavior normal          Thought Content:  Thought content normal          Judgment: Judgment normal                     Nicholas Jorgensen MD,   [facility]  5/24/2023

## 2023-05-31 ENCOUNTER — TELEPHONE (OUTPATIENT)
Dept: FAMILY MEDICINE CLINIC | Facility: CLINIC | Age: 48
End: 2023-05-31

## 2023-05-31 DIAGNOSIS — J32.9 SINUSITIS, UNSPECIFIED CHRONICITY, UNSPECIFIED LOCATION: Primary | ICD-10-CM

## 2023-05-31 RX ORDER — AMOXICILLIN 500 MG/1
500 CAPSULE ORAL EVERY 12 HOURS SCHEDULED
Qty: 14 CAPSULE | Refills: 0 | Status: SHIPPED | OUTPATIENT
Start: 2023-05-31 | End: 2023-06-07

## 2023-05-31 NOTE — TELEPHONE ENCOUNTER
Pt states that she still has all the same symptoms from her last appt  Please advise if another appt is needed

## 2023-05-31 NOTE — TELEPHONE ENCOUNTER
If she does not use the Sinex she can't breath, she has a cough that is lingering and flonase does nothing

## 2023-05-31 NOTE — TELEPHONE ENCOUNTER
I can switch to Amoxacillin, but if there is no improvement with this then we might need ENT evaluation

## 2023-06-06 ENCOUNTER — OFFICE VISIT (OUTPATIENT)
Dept: AUDIOLOGY | Facility: CLINIC | Age: 48
End: 2023-06-06
Payer: COMMERCIAL

## 2023-06-06 DIAGNOSIS — H90.3 SENSORINEURAL HEARING LOSS (SNHL), BILATERAL: Primary | ICD-10-CM

## 2023-06-06 PROCEDURE — 92593 HB HEARING AID CHECK BOTH EARS: CPT | Performed by: AUDIOLOGIST

## 2023-06-06 NOTE — PROGRESS NOTES
[facility] AUDIOLOGY HEARING AID CHECK    Goyo Ceja was seen today (6/6/2023) for a hearing aid check of her bilateral hearing aids  Today, Ms Geovanny Love reported no issues with the hearing aids  Otoscopy revealed clear canals  no contact sores or irritation seen          Device Information    Hearing Aid Fitting Date: 2/8/2023       Left Device Right Device   Hearing Aid Make: [place]   Hearing Aid Model: MORE 1 miniRITE MORE 1 miniRITE   Serial Number: [id number]   Repair Warranty Expiration Date: 02/11/2026 02/11/2026   Loss/Damage Warranty Expiration Date:  02/11/2026 02/11/2026    Length: 1 1    Output: 60 60   Wax System: Pro Wax miniFIT Pro Wax miniFIT   Dome Size/Style: 6mm open 8mm open   Battery: Lithium-ion Rechargeable Lithium-ion Rechargeable   [employer] Serial Number: N/A N/A   [facility] Date:  N/A N/A      Serial Number: [id number]  Accessories: N/A    Visual inspection of the device(s) revealed no noticeable damage or defects  A listening check revealed good sound quality from the device(s)  Changes to Device(s)    • Devices cleaned, microphone ports vacuumed  • Datalogging revealed ~3h/day daily usage  Patient admitted to sometimes forgetting them at home  Recommendations & Follow-up    Hearing aids(s) are in good working condition  Ms Geovanny Love stated that she has no further questions with her hearing aid(s) and does not feed that any other adjustments are needed at this time  She will follow-up in 6 months, sooner if other problems/concerns arise        It was a pleasure working with Ms Geovanny Love today  She was encouraged to contact the clinic with any further questions in the meantime  Karrie Vogt    Clinical Audiologist    [address] [address] [id number]

## 2023-08-05 LAB
ALBUMIN SERPL-MCNC: 4.7 G/DL (ref 3.9–4.9)
ALBUMIN/GLOB SERPL: 1.7 {RATIO} (ref 1.2–2.2)
ALP SERPL-CCNC: 55 IU/L (ref 44–121)
ALT SERPL-CCNC: 9 IU/L (ref 0–32)
APPEARANCE UR: CLEAR
AST SERPL-CCNC: 18 IU/L (ref 0–40)
BACTERIA URNS QL MICRO: NORMAL
BASOPHILS # BLD AUTO: 0.1 X10E3/UL (ref 0–0.2)
BASOPHILS NFR BLD AUTO: 1 %
BILIRUB SERPL-MCNC: 0.6 MG/DL (ref 0–1.2)
BILIRUB UR QL STRIP: NEGATIVE
BUN SERPL-MCNC: 14 MG/DL (ref 6–24)
BUN/CREAT SERPL: 17 (ref 9–23)
C3 SERPL-MCNC: 103 MG/DL (ref 82–167)
C4 SERPL-MCNC: 20 MG/DL (ref 12–38)
CALCIUM SERPL-MCNC: 9.6 MG/DL (ref 8.7–10.2)
CASTS URNS QL MICRO: NORMAL /LPF
CHLORIDE SERPL-SCNC: 101 MMOL/L (ref 96–106)
CO2 SERPL-SCNC: 24 MMOL/L (ref 20–29)
COLOR UR: YELLOW
CREAT SERPL-MCNC: 0.83 MG/DL (ref 0.57–1)
CREAT UR-MCNC: 57.8 MG/DL
CRP SERPL-MCNC: 2 MG/L (ref 0–10)
DSDNA AB SER-ACNC: <1 IU/ML (ref 0–9)
EGFR: 87 ML/MIN/1.73
EOSINOPHIL # BLD AUTO: 0.1 X10E3/UL (ref 0–0.4)
EOSINOPHIL NFR BLD AUTO: 1 %
EPI CELLS #/AREA URNS HPF: NORMAL /HPF (ref 0–10)
ERYTHROCYTE [DISTWIDTH] IN BLOOD BY AUTOMATED COUNT: 13.6 % (ref 11.7–15.4)
ERYTHROCYTE [SEDIMENTATION RATE] IN BLOOD BY WESTERGREN METHOD: 3 MM/HR (ref 0–32)
GLOBULIN SER-MCNC: 2.7 G/DL (ref 1.5–4.5)
GLUCOSE SERPL-MCNC: 91 MG/DL (ref 70–99)
GLUCOSE UR QL: NEGATIVE
HCT VFR BLD AUTO: 38.9 % (ref 34–46.6)
HGB BLD-MCNC: 13.2 G/DL (ref 11.1–15.9)
HGB UR QL STRIP: NEGATIVE
IMM GRANULOCYTES # BLD: 0 X10E3/UL (ref 0–0.1)
IMM GRANULOCYTES NFR BLD: 1 %
KETONES UR QL STRIP: NEGATIVE
LEUKOCYTE ESTERASE UR QL STRIP: NEGATIVE
LYMPHOCYTES # BLD AUTO: 1.6 X10E3/UL (ref 0.7–3.1)
LYMPHOCYTES NFR BLD AUTO: 26 %
MCH RBC QN AUTO: 30.6 PG (ref 26.6–33)
MCHC RBC AUTO-ENTMCNC: 33.9 G/DL (ref 31.5–35.7)
MCV RBC AUTO: 90 FL (ref 79–97)
MICRO URNS: NORMAL
MICRO URNS: NORMAL
MONOCYTES # BLD AUTO: 0.5 X10E3/UL (ref 0.1–0.9)
MONOCYTES NFR BLD AUTO: 8 %
NEUTROPHILS # BLD AUTO: 3.9 X10E3/UL (ref 1.4–7)
NEUTROPHILS NFR BLD AUTO: 63 %
NITRITE UR QL STRIP: NEGATIVE
PH UR STRIP: 6 [PH] (ref 5–7.5)
PLATELET # BLD AUTO: 233 X10E3/UL (ref 150–450)
POTASSIUM SERPL-SCNC: 4.5 MMOL/L (ref 3.5–5.2)
PROT SERPL-MCNC: 7.4 G/DL (ref 6–8.5)
PROT UR QL STRIP: NEGATIVE
PROT UR-MCNC: 4.9 MG/DL
PROT/CREAT UR: 85 MG/G CREAT (ref 0–200)
RBC # BLD AUTO: 4.31 X10E6/UL (ref 3.77–5.28)
RBC #/AREA URNS HPF: NORMAL /HPF (ref 0–2)
SODIUM SERPL-SCNC: 140 MMOL/L (ref 134–144)
SP GR UR: 1.01 (ref 1–1.03)
UROBILINOGEN UR STRIP-ACNC: 0.2 MG/DL (ref 0.2–1)
WBC # BLD AUTO: 6.1 X10E3/UL (ref 3.4–10.8)
WBC #/AREA URNS HPF: NORMAL /HPF (ref 0–5)

## 2023-08-11 ENCOUNTER — OFFICE VISIT (OUTPATIENT)
Dept: FAMILY MEDICINE CLINIC | Facility: CLINIC | Age: 48
End: 2023-08-11
Payer: COMMERCIAL

## 2023-08-11 VITALS
SYSTOLIC BLOOD PRESSURE: 100 MMHG | TEMPERATURE: 97.4 F | RESPIRATION RATE: 16 BRPM | WEIGHT: 157 LBS | DIASTOLIC BLOOD PRESSURE: 68 MMHG | BODY MASS INDEX: 24.64 KG/M2 | OXYGEN SATURATION: 99 % | HEIGHT: 67 IN | HEART RATE: 67 BPM

## 2023-08-11 DIAGNOSIS — J02.9 SORE THROAT: Primary | ICD-10-CM

## 2023-08-11 LAB
SARS-COV-2 AG UPPER RESP QL IA: NEGATIVE
VALID CONTROL: NORMAL

## 2023-08-11 PROCEDURE — 87811 SARS-COV-2 COVID19 W/OPTIC: CPT | Performed by: FAMILY MEDICINE

## 2023-08-11 PROCEDURE — 99213 OFFICE O/P EST LOW 20 MIN: CPT | Performed by: FAMILY MEDICINE

## 2023-08-11 RX ORDER — METHYLPREDNISOLONE 4 MG/1
TABLET ORAL
Qty: 21 EACH | Refills: 0 | Status: SHIPPED | OUTPATIENT
Start: 2023-08-11

## 2023-08-11 RX ORDER — FLUCONAZOLE 150 MG/1
TABLET ORAL
Qty: 2 TABLET | Refills: 0 | Status: SHIPPED | OUTPATIENT
Start: 2023-08-11 | End: 2023-08-16

## 2023-08-11 RX ORDER — AMOXICILLIN 875 MG/1
875 TABLET, COATED ORAL 2 TIMES DAILY
Qty: 14 TABLET | Refills: 0 | Status: SHIPPED | OUTPATIENT
Start: 2023-08-11 | End: 2023-08-18

## 2023-08-11 NOTE — PROGRESS NOTES
Tangela Coker 1975 female MRN: [id number]    [address] [place] [address] OFFICE VISIT  [facility] - [address] [facility]      ASSESSMENT/PLAN  Tangela Coker is a 50 y.o. female presents to the office for    Diagnoses and all orders for this visit:    Sore throat  -     fluconazole (DIFLUCAN) 150 mg tablet; Take 1 tablet now and take 1 tablet in 3 days  -     amoxicillin (AMOXIL) 875 mg tablet; Take 1 tablet (875 mg total) by mouth 2 (two) times a day for 7 days  -     methylPREDNISolone 4 MG tablet therapy pack; Use as directed on package  -     POCT Rapid Covid Ag          Return to the office if no improvement    Future Appointments   Date Time Provider [address]   8/17/2023 10:00 AM Balaji eRza MD [facility]   12/20/2023  9:00 AM Romayne Stapler, AuD. [place] [facility]   12/20/2023  9:30 AM Romayne Stapler, AuD. [place] [facility] [address]          SUBJECTIVE  CC: Sore Throat (Ears hurt, swollen glands )      HPI:  Tangela Coker is a 50 y.o. female who presents for an acute appointment. Yesterday morning. Ears hurting, sore throat, glands are swollen, and fatigue   Review of Systems   Constitutional: Positive for fatigue. Negative for activity change, appetite change, chills and fever. HENT: Positive for congestion, ear pain and sore throat. Respiratory: Negative for cough, chest tightness and shortness of breath. Cardiovascular: Negative for chest pain and leg swelling. Gastrointestinal: Negative for abdominal distention, abdominal pain, constipation, diarrhea, nausea and vomiting. All other systems reviewed and are negative.       Historical Information   The patient history was reviewed as follows:  Past Medical History:   Diagnosis Date   • Acquired ankle/foot deformity     last assessed: 8/29/2017   • Anxiety    • Chronic pain disorder     right foot plantar   • 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   • Dysfunctional uterine bleeding     Last assessed: 1/9/2018 • Headache    • Lupus (HCC)    • Mass of knee     last assessed: 5/7/2014   • Mild acid reflux    • Paronychia of finger     last assessed: 11/20/2013   • Paronychia of toe     last asssessed: 9/28/2016. right foot   • Patellofemoral dysfunction     last assessed: 3/21/2014   • PCOS (polycystic ovarian syndrome) 12/15/2007   • Pes planus, congenital     last assessed: 8/29/2017   • Plantar fasciitis of right foot    • Plantar fibromatosis     last assessed: 9/28/2017   • PONV (postoperative nausea and vomiting)    • Trichomoniasis     last assessed: 1/16/2015   • Vaginal candidiasis     last assessed: 11/30/2016   • Varicose veins of lower extremity     last assessed: 3/5/2014   • Vertigo     last assessed: 3/5/2014   • Viral gastroenteritis     last assessed: 6/4/2015         Medications:     Current Outpatient Medications:   •  albuterol (PROVENTIL HFA,VENTOLIN HFA) 90 mcg/act inhaler, Inhale 1 puff every 6 (six) hours as needed for wheezing, Disp: 18 g, Rfl: 4  •  amoxicillin (AMOXIL) 875 mg tablet, Take 1 tablet (875 mg total) by mouth 2 (two) times a day for 7 days, Disp: 14 tablet, Rfl: 0  •  Biotin 10 MG TABS, Take by mouth, Disp: , Rfl:   •  cholecalciferol (VITAMIN D3) 1,000 units tablet, Take 1,000 Units by mouth daily 5000 unit daily , Disp: , Rfl:   •  fluconazole (DIFLUCAN) 150 mg tablet, Take 1 tablet now and take 1 tablet in 3 days, Disp: 2 tablet, Rfl: 0  •  folic acid (FOLVITE) 1 mg tablet, Take 3 tablets (3 mg total) by mouth daily, Disp: 270 tablet, Rfl: 3  •  hydroxychloroquine (PLAQUENIL) 200 mg tablet, Take 1 tablet (200 mg total) by mouth 2 (two) times a day with meals, Disp: 180 tablet, Rfl: 1  •  ibuprofen (MOTRIN) 600 mg tablet, Take 1 tablet (600 mg total) by mouth every 8 (eight) hours as needed for mild pain, Disp: 30 tablet, Rfl: 0  •  methotrexate 2.5 mg tablet, Take 8 tabs once weekly. , Disp: 96 tablet, Rfl: 1  •  methylPREDNISolone 4 MG tablet therapy pack, Use as directed on package, Disp: 21 each, Rfl: 0  •  naproxen (NAPROSYN) 500 mg tablet, take 1 tablet by mouth twice a day with MEALS (Patient taking differently: as needed), Disp: 60 tablet, Rfl: 0  •  Omega-3 Fatty Acids (FISH OIL) 1,000 mg, Take 1,000 mg by mouth daily, Disp: , Rfl:   •  omeprazole (PriLOSEC) 20 mg delayed release capsule, take 1 capsule by mouth once daily (Patient taking differently: daily as needed), Disp: 30 capsule, Rfl: 0  •  oxymetazoline (Vicks Sinex 12 Hour Decongest) 0.05 % nasal spray, 2 sprays by Each Nare route 2 (two) times a day, Disp: , Rfl:   •  SUMAtriptan (IMITREX) 100 mg tablet, TAKE 1 TABLET (100 MG TOTAL) BY MOUTH ONCE AS NEEDED FOR MIGRAINE FOR UP TO 1 DOSE TAKE WHEN EXPERI*, Disp: 9 tablet, Rfl: 0  •  diazepam (VALIUM) 5 mg tablet, , Disp: , Rfl:   •  fluticasone (FLOVENT HFA) 110 MCG/ACT inhaler, Inhale 1 puff 2 (two) times a day Rinse mouth after use. (Patient not taking: Reported on 5/24/2023), Disp: 1 Inhaler, Rfl: 0  •  gabapentin (NEURONTIN) 600 MG tablet, Take 1 tablet (600 mg total) by mouth 2 (two) times a day for 30 days (Patient not taking: Reported on 5/24/2023), Disp: 60 tablet, Rfl: 0  •  methylPREDNISolone 4 MG tablet therapy pack, Use as directed on package (Patient not taking: Reported on 8/11/2023), Disp: 21 each, Rfl: 0  •  sodium chloride (OCEAN) 0.65 % nasal spray, 1 spray into each nostril (Patient not taking: Reported on 5/24/2023), Disp: , Rfl:     Allergies   Allergen Reactions   • Azithromycin GI Intolerance       OBJECTIVE  Vitals:   Vitals:    08/11/23 1008   BP: 100/68   BP Location: Left arm   Patient Position: Sitting   Cuff Size: Standard   Pulse: 67   Resp: 16   Temp: (!) 97.4 °F (36.3 °C)   SpO2: 99%   Weight: 71.2 kg (157 lb)   Height: 5' 7" (1.702 m)         Physical Exam  Vitals reviewed. Constitutional:       Appearance: She is well-developed. HENT:      Head: Normocephalic and atraumatic.       Right Ear: Ear canal normal. A middle ear effusion is present. Left Ear: Ear canal normal. A middle ear effusion is present. Mouth/Throat:      Mouth: Mucous membranes are moist.      Pharynx: Posterior oropharyngeal erythema present. Eyes:      Conjunctiva/sclera: Conjunctivae normal.      Pupils: Pupils are equal, round, and reactive to light. Cardiovascular:      Rate and Rhythm: Normal rate and regular rhythm. Heart sounds: Normal heart sounds. Pulmonary:      Effort: Pulmonary effort is normal. No respiratory distress. Breath sounds: Normal breath sounds. Musculoskeletal:         General: Normal range of motion. Cervical back: Normal range of motion and neck supple. Skin:     General: Skin is warm. Capillary Refill: Capillary refill takes less than 2 seconds. Neurological:      Mental Status: She is alert and oriented to person, place, and time.                     Noelle Hdz MD,   [facility]  8/12/2023

## 2023-08-12 ENCOUNTER — TELEPHONE (OUTPATIENT)
Dept: FAMILY MEDICINE CLINIC | Facility: CLINIC | Age: 48
End: 2023-08-12

## 2023-08-12 NOTE — TELEPHONE ENCOUNTER
----- Message from Van Noonan MD sent at 8/11/2023 10:34 AM EDT -----  Can we get records for PHYSICIANS [facility]        [address] [place] [address] Henrry Lazar, [address] [place] [address] · ~36.9 mi  [phone]

## 2023-08-15 NOTE — PROGRESS NOTES
Assessment and Plan:   Ms. Shar June a 80-year-old female with history significant for systemic lupus erythematosus and fibromyalgia who presents for a follow-up. She is currently on hydroxychloroquine 200 mg twice daily, methotrexate 20 mg once weekly with folic acid 3 mg daily and gabapentin 600 mg at bedtime.       # Systemic lupus erythematosus  - Leopoldo Butcher presents today for a follow-up of systemic lupus erythematosus and fibromyalgia that was diagnosed in 2017. Since then she has been on hydroxychloroquine at 200 mg twice daily and methotrexate 20 mg once weekly which has overall kept her stable. She is not reporting any symptoms today suggestive of activity related to the lupus and reassuringly her recent serologies have all been normal.  She will continue the same regimen unchanged and update lupus related lab monitoring prior to the follow-up visit. Of note as she has been dealing with upper respiratory tract infection/allergic symptoms I advised her to hold the methotrexate for the next 2 weeks to minimize immunosuppression while she is on antibiotics. She is aware to hold this in the future as well if she is diagnosed with an infection or is on antibiotics. I encouraged her to schedule an appointment with ENT given the recurrent sinonasal symptoms.    - She will call ophthalmology to ensure she is up-to-date with annual eye exams and visual field testing. # Chronic low back pain secondary to lumbar degenerative arthritis  - I encouraged her to rethink the spinal stimulator or interventional procedures and follow-up with spine and pain management for further guidance. # Right elbow pain secondary to lateral epicondylitis  - I will obtain an x-ray to further evaluate her symptoms but suspect her pain is secondary to lateral epicondylitis. I advised her to proceed with a conservative approach of resting, bracing, icing as needed and using Tylenol/NSAIDs as needed.   If her symptoms persist a cortisone injection or physical therapy can also be considered. If her symptoms need to be pursued further depending on the results of the x-ray I may also obtain an ultrasound to evaluate for an inflammatory arthropathy/cubital tunnel syndrome. I have spent a total time of 40 minutes on 08/17/23 in caring for this patient including Diagnostic results, Risks and benefits of tx options, Patient and family education and Impressions. Plan:  Diagnoses and all orders for this visit:    Systemic lupus erythematosus, unspecified SLE type, unspecified organ involvement status ([address])  -     CBC and differential; Future  -     Comprehensive metabolic panel; Future  -     C-reactive protein; Future  -     Sedimentation rate, automated; Future  -     C4 complement; Future  -     C3 complement; Future  -     Anti-DNA antibody, double-stranded; Future  -     Protein / creatinine ratio, urine  -     Urinalysis with microscopic  -     Anti-neutrophilic cytoplasmic antibody; Future  -     CBC and differential  -     Comprehensive metabolic panel  -     C-reactive protein  -     Sedimentation rate, automated  -     C4 complement  -     C3 complement  -     Anti-DNA antibody, double-stranded  -     Anti-neutrophilic cytoplasmic antibody    Long-term use of Plaquenil    Methotrexate, long term, current use    Fibromyalgia    Right elbow pain  -     XR elbow 3+ vw right; Future    Lateral epicondylitis, right elbow    Osteoarthritis of cervical spine, unspecified spinal osteoarthritis complication status    Osteoarthritis of lumbar spine, unspecified spinal osteoarthritis complication status      Activities as tolerated. Exercise: try to maintain a low impact exercise regimen as much as possible. Walk for 30 minutes a day for at least 3 days a week. Continue other medications as prescribed by PCP and other specialists. RTC in 6 months.         HPI    INITIAL VISIT NOTE (10/2022):  Ms. Susie Liang a 80-year-old female with history significant for systemic lupus erythematosus and fibromyalgia who presents to establish with [facility]. She is currently on hydroxychloroquine 200 mg twice daily, methotrexate 20 mg once weekly with folic acid 1 mg daily and gabapentin 600 mg at bedtime. She is transferring care from Dr. Wilmer Tabares. She is self-referred today.       I am unable to access her initial labs but based on her prior rheumatology records she was diagnosed with systemic lupus erythematosus in 2017 when she presented with a positive GLENNA, double-stranded DNA antibody as well as diffuse arthralgias. Based on labs that I am able to access dating back to 2020 she has presented with a normal GLENNA, double-stranded DNA antibody, inflammatory markers and complements. She mentions at the time of her diagnosis she was started on hydroxychloroquine 200 mg twice daily which she has been on since then and follows regularly with Ophthalmology. She did not feel like the hydroxychloroquine really impacted her symptoms and a few months after her diagnosis methotrexate was added on currently at a dose of 20 mg once weekly. She reports that this does help to some degree. She also has a diagnosis of fibromyalgia given the ongoing pain and eventually duloxetine was added but as she developed side effects to this it was discontinued and currently she is on gabapentin 600 mg nightly which helps with the body pain and her sleep. She was initially prescribed 600 mg twice daily but stopped the morning dose as it was causing excessive drowsiness. She takes an occasional over-the-counter Advil as needed which does help her. She reports that she still experiences pain in her hands and knees without any joint swelling. She describes morning stiffness that takes a few minutes to improve as well as gelling phenomenon.     She reports previously she was experiencing significant hair loss but this has improved since starting biotin.   She does feel warm at times but has not checked her temperature. Intermittently she will notice a redness appearance on her neck but denies other skin rashes. At times she has noticed a sun sensitive skin rash arise on her legs. She thinks that she may manifest with nose and mouth sores but has never visualized this. She reports a history of 1 episode of colitis many years ago. She does not experience classic Raynaud's symptoms with color changes on cold exposure but reports that with slight cold exposure she will notice icy feet.     She denies fevers, unintentional weight loss, ongoing alopecia, dry eyes, dry mouth, inflammatory eye disease, psoriasis, swollen glands, pleuritic chest pain, inflammatory bowel disease, blood clots, miscarriages or a family history of autoimmune disease.     I reviewed her most recent labs from August 2022 which showed an unremarkable CBC, CMP, ESR, CRP, C3, C4, double-stranded DNA antibody, GLENNA screen and urinalysis. 2/16/2023:  Patient presents for a follow-up of systemic lupus erythematosus and fibromyalgia. She is currently on hydroxychloroquine 200 mg twice daily, methotrexate 20 mg once weekly with folic acid 3 mg daily and gabapentin 600 mg at bedtime. I reviewed her testing done on 2/2/2023 and this showed a normal GLENNA specificity, ESR, CRP, C3, C4, antiphospholipid antibody testing, urine analysis, urine protein creatinine ratio, rheumatoid factor, anti-CCP antibody, ferritin, HLA-B27 antigen, chronic hepatitis panel, CBC, CMP and thyroid antibody profile. She is not reporting any new complaints today but is continuing to experience fatigue. She is a single parent and cares for her 6year-old twins and 10year-old child who has ADHD and reports that this keeps her very busy. She is not reporting any fevers, unintentional weight loss, skin rashes, mouth/nose ulcers, swollen glands, pleuritic chest pain or new joint pains at this time.       8/17/2023:  Patient presents for a follow-up of systemic lupus erythematosus and fibromyalgia. She is currently on hydroxychloroquine 200 mg twice daily, methotrexate 20 mg once weekly with folic acid 3 mg daily and gabapentin 600 mg at bedtime. I reviewed her recent labs which showed a normal CBC, CMP, ESR, CRP, C3, C4, double-stranded DNA antibody, urine analysis and urine protein creatinine ratio. She reports over the past few months she has been seen by her primary care physician multiple times as she continues to experience a sensation like her nose and throat are swollen and irritated. She has not seen ENT during this timeframe. She did see an allergist and had allergy testing done which was unremarkable. She may have received an intramuscular steroid injection in their office which significantly helped with her symptoms but reports she has continued to have recurrent complaints following this. Most recently she was seen by her PCP and prescribed amoxicillin for 1 week with 2 days remaining. She reports that this has helped with her symptoms. She was also prescribed steroids but has not started this since the amoxicillin has helped. Of note she has not held the methotrexate during her illness. She has also been dealing with chronic low back pain and stiffness secondary to lumbar degenerative arthritis. She was seen by spine and pain management and a spinal cord stimulator was discussed with her but she is hesitant to proceed with this. She is trying to avoid interventional procedures as [facility] does not use general anesthesia for these procedures and she is anxious about getting them done without anesthesia. Other than these complaints she reports chronic pain affecting her right elbow which at times can sharply radiate down her forearm. No additional concerning joint pains. No swollen joints.   She experiences morning stiffness affecting her diffusely especially in her low back that starts to improve gradually with activities. She reports chronic redness on her neck which at times can flareup. Otherwise no fevers, unintentional weight loss, new skin rashes, mouth/nose ulcers, swollen glands or pleuritic chest pain. She has been tolerating the methotrexate and hydroxychloroquine well and has to check when she had her last annual eye exam.    The following portions of the patient's history were reviewed and updated as appropriate: allergies, current medications, past family history, past medical history, past social history, past surgical history and problem list.      Review of Systems  Constitutional: Negative for weight change, fevers, chills, night sweats. Positive for fatigue. ENT/Mouth: Negative for hearing changes, ear pain, nasal congestion, sinus pain, hoarseness, sore throat, rhinorrhea, swallowing difficulty. Eyes: Negative for pain, redness, discharge, vision changes. Cardiovascular: Negative for chest pain, SOB, palpitations. Respiratory: Negative for cough, sputum, wheezing, dyspnea. Gastrointestinal: Negative for nausea, vomiting, diarrhea, constipation, pain, heartburn. Genitourinary: Negative for dysuria, urinary frequency, hematuria. Musculoskeletal: As per HPI. Skin: Negative for skin rash, color changes. Neuro: Negative for weakness, numbness, tingling, loss of consciousness. Psych: Negative for anxiety, depression. Heme/Lymph: Negative for easy bruising, bleeding, lymphadenopathy. Past Medical History:   Diagnosis Date   • Acquired ankle/foot deformity     last assessed: 8/29/2017   • Anxiety    • Chronic pain disorder     right foot plantar   • 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   • Dysfunctional uterine bleeding     Last assessed: 1/9/2018   • Headache    • Lupus ([address])    • Mass of knee     last assessed: 5/7/2014   • Mild acid reflux    • Paronychia of finger     last assessed: 11/20/2013   • Paronychia of toe     last asssessed: 9/28/2016.  right foot   • Patellofemoral dysfunction last assessed: 3/21/2014   • PCOS (polycystic ovarian syndrome) 12/15/2007   • Pes planus, congenital     last assessed: 2017   • Plantar fasciitis of right foot    • Plantar fibromatosis     last assessed: 2017   • PONV (postoperative nausea and vomiting)    • Trichomoniasis     last assessed: 2015   • Vaginal candidiasis     last assessed: 2016   • Varicose veins of lower extremity     last assessed: 3/5/2014   • Vertigo     last assessed: 3/5/2014   • Viral gastroenteritis     last assessed: 2015       Past Surgical History:   Procedure Laterality Date   • BACK SURGERY      discectomy lumbar   •  SECTION      x2 bikini   • PILONIDAL CYST EXCISION     • MI HYSTEROSCOPY BX ENDOMETRIUM&/POLYPC W/WO D&C N/A 10/18/2017    Procedure: HYSTEROSCOPY AND FRACTIONAL DILATATION AND CURRETTAGE;  Surgeon: Martir Mendoza MD;  Location: [facility] MAIN OR;  Service: Gynecology   • TUBAL LIGATION     • WISDOM TOOTH EXTRACTION         Social History     Socioeconomic History   • Marital status: Single     Spouse name: Not on file   • Number of children: Not on file   • Years of education: Not on file   • Highest education level: Not on file   Occupational History   • Not on file   Tobacco Use   • Smoking status: Former     Packs/day: 0.50     Years: 20.00     Total pack years: 10.00     Types: Cigarettes     Quit date:      Years since quittin.6   • Smokeless tobacco: Never   Vaping Use   • Vaping Use: Never used   Substance and Sexual Activity   • Alcohol use: Yes     Comment: occ   • Drug use: No   • Sexual activity: Not on file   Other Topics Concern   • Not on file   Social History Narrative    Daily cola consumption    Daily Tea Consumption     Social Determinants of Health     Financial Resource Strain: Not on file   Food Insecurity: Not on file   Transportation Needs: Not on file   Physical Activity: Not on file   Stress: Not on file   Social Connections: Not on file   Intimate Partner Violence: Not on file   Housing Stability: Not on file       Family History   Problem Relation Age of Onset   • Cancer Mother         skin , gallbladder   • Liver disease Mother    • Hypertension Father    • Heart disease Sister    • No Known Problems Daughter    • No Known Problems Maternal Grandmother    • No Known Problems Maternal Grandfather    • No Known Problems Paternal Grandmother    • No Known Problems Paternal Grandfather    • Cancer Brother         skin   • No Known Problems Son    • No Known Problems Son    • No Known Problems Maternal Aunt    • Colon cancer Maternal Uncle    • No Known Problems Paternal Aunt    • No Known Problems Paternal Uncle    • Arthritis Family    • Lung cancer Family    • Uterine cancer Family    • Breast cancer Cousin 28   • ADD / ADHD Neg Hx    • Anesthesia problems Neg Hx    • Clotting disorder Neg Hx    • Collagen disease Neg Hx    • Diabetes Neg Hx    • Dislocations Neg Hx    • Learning disabilities Neg Hx    • Neurological problems Neg Hx    • Osteoporosis Neg Hx    • Rheumatologic disease Neg Hx    • Scoliosis Neg Hx    • Vascular Disease Neg Hx        Allergies   Allergen Reactions   • Azithromycin GI Intolerance       Current Outpatient Medications:   •  albuterol (PROVENTIL HFA,VENTOLIN HFA) 90 mcg/act inhaler, Inhale 1 puff every 6 (six) hours as needed for wheezing, Disp: 18 g, Rfl: 4  •  amoxicillin (AMOXIL) 875 mg tablet, Take 1 tablet (875 mg total) by mouth 2 (two) times a day for 7 days, Disp: 14 tablet, Rfl: 0  •  Biotin 10 MG TABS, Take by mouth, Disp: , Rfl:   •  cholecalciferol (VITAMIN D3) 1,000 units tablet, Take 1,000 Units by mouth daily 5000 unit daily , Disp: , Rfl:   •  diazepam (VALIUM) 5 mg tablet, , Disp: , Rfl:   •  fluticasone (FLOVENT HFA) 110 MCG/ACT inhaler, Inhale 1 puff 2 (two) times a day Rinse mouth after use.  (Patient not taking: Reported on 5/24/2023), Disp: 1 Inhaler, Rfl: 0  •  folic acid (FOLVITE) 1 mg tablet, Take 3 tablets (3 mg total) by mouth daily, Disp: 270 tablet, Rfl: 3  •  gabapentin (NEURONTIN) 600 MG tablet, Take 1 tablet (600 mg total) by mouth 2 (two) times a day for 30 days (Patient not taking: Reported on 5/24/2023), Disp: 60 tablet, Rfl: 0  •  hydroxychloroquine (PLAQUENIL) 200 mg tablet, Take 1 tablet (200 mg total) by mouth 2 (two) times a day with meals, Disp: 180 tablet, Rfl: 1  •  ibuprofen (MOTRIN) 600 mg tablet, Take 1 tablet (600 mg total) by mouth every 8 (eight) hours as needed for mild pain, Disp: 30 tablet, Rfl: 0  •  methotrexate 2.5 mg tablet, Take 8 tabs once weekly. , Disp: 96 tablet, Rfl: 1  •  methylPREDNISolone 4 MG tablet therapy pack, Use as directed on package (Patient not taking: Reported on 8/11/2023), Disp: 21 each, Rfl: 0  •  methylPREDNISolone 4 MG tablet therapy pack, Use as directed on package, Disp: 21 each, Rfl: 0  •  naproxen (NAPROSYN) 500 mg tablet, take 1 tablet by mouth twice a day with MEALS (Patient taking differently: as needed), Disp: 60 tablet, Rfl: 0  •  Omega-3 Fatty Acids (FISH OIL) 1,000 mg, Take 1,000 mg by mouth daily, Disp: , Rfl:   •  omeprazole (PriLOSEC) 20 mg delayed release capsule, take 1 capsule by mouth once daily (Patient taking differently: daily as needed), Disp: 30 capsule, Rfl: 0  •  oxymetazoline (Vicks Sinex 12 Hour Decongest) 0.05 % nasal spray, 2 sprays by Each Nare route 2 (two) times a day, Disp: , Rfl:   •  sodium chloride (OCEAN) 0.65 % nasal spray, 1 spray into each nostril (Patient not taking: Reported on 5/24/2023), Disp: , Rfl:   •  SUMAtriptan (IMITREX) 100 mg tablet, TAKE 1 TABLET (100 MG TOTAL) BY MOUTH ONCE AS NEEDED FOR MIGRAINE FOR UP TO 1 DOSE TAKE WHEN EXPERI*, Disp: 9 tablet, Rfl: 0      Objective:    Vitals:    08/17/23 1000   BP: 106/82   Pulse: 59   Weight: 71.2 kg (157 lb)       Physical Exam  General: Well appearing, well nourished, in no distress. Oriented x 3, normal mood and affect. Ambulating without difficulty.   Skin: Good turgor, no rash, unusual bruising or prominent lesions. Mild erythema noted on the lower aspect of her neck. Hair: Hair thinning noted. Nails: Normal color, no deformities. HEENT:  Head: Normocephalic, atraumatic. Eyes: Conjunctiva clear, sclera non-icteric, EOM intact. Extremities: No amputations or deformities, cyanosis, edema. Musculoskeletal:  Right elbow lateral epicondyle tenderness noted. No soft tissue swelling. Neurologic: Alert and oriented. No focal neurological deficits appreciated. Psychiatric: Normal mood and affect. Job Dean M.D.   Rheumatology

## 2023-08-17 ENCOUNTER — HOSPITAL ENCOUNTER (OUTPATIENT)
Dept: RADIOLOGY | Facility: HOSPITAL | Age: 48
Discharge: HOME | End: 2023-08-17
Payer: COMMERCIAL

## 2023-08-17 ENCOUNTER — OFFICE VISIT (OUTPATIENT)
Dept: RHEUMATOLOGY | Facility: CLINIC | Age: 48
End: 2023-08-17
Payer: COMMERCIAL

## 2023-08-17 VITALS
WEIGHT: 157 LBS | HEART RATE: 59 BPM | SYSTOLIC BLOOD PRESSURE: 106 MMHG | BODY MASS INDEX: 24.59 KG/M2 | DIASTOLIC BLOOD PRESSURE: 82 MMHG

## 2023-08-17 DIAGNOSIS — M25.521 RIGHT ELBOW PAIN: ICD-10-CM

## 2023-08-17 DIAGNOSIS — M47.812 OSTEOARTHRITIS OF CERVICAL SPINE, UNSPECIFIED SPINAL OSTEOARTHRITIS COMPLICATION STATUS: ICD-10-CM

## 2023-08-17 DIAGNOSIS — M32.9 SYSTEMIC LUPUS ERYTHEMATOSUS, UNSPECIFIED SLE TYPE, UNSPECIFIED ORGAN INVOLVEMENT STATUS (HCC): Primary | ICD-10-CM

## 2023-08-17 DIAGNOSIS — M79.7 FIBROMYALGIA: ICD-10-CM

## 2023-08-17 DIAGNOSIS — M47.816 OSTEOARTHRITIS OF LUMBAR SPINE, UNSPECIFIED SPINAL OSTEOARTHRITIS COMPLICATION STATUS: ICD-10-CM

## 2023-08-17 DIAGNOSIS — Z79.899 LONG-TERM USE OF PLAQUENIL: ICD-10-CM

## 2023-08-17 DIAGNOSIS — Z79.631 METHOTREXATE, LONG TERM, CURRENT USE: ICD-10-CM

## 2023-08-17 DIAGNOSIS — M77.11 LATERAL EPICONDYLITIS, RIGHT ELBOW: ICD-10-CM

## 2023-08-17 PROCEDURE — 73080 X-RAY EXAM OF ELBOW: CPT

## 2023-08-17 PROCEDURE — 99215 OFFICE O/P EST HI 40 MIN: CPT | Performed by: INTERNAL MEDICINE

## 2023-08-17 NOTE — PATIENT INSTRUCTIONS
1) Schedule appointment with Dr. Zuleyma Joshi and Srinivas Azevedo. 2) Hold methotrexate for 1-2 weeks. 3) Right elbow xray. 4) Schedule yearly eye appointment for visual field testing.

## 2023-08-18 DIAGNOSIS — M32.9 SYSTEMIC LUPUS ERYTHEMATOSUS, UNSPECIFIED SLE TYPE, UNSPECIFIED ORGAN INVOLVEMENT STATUS (HCC): ICD-10-CM

## 2023-08-18 RX ORDER — HYDROXYCHLOROQUINE SULFATE 200 MG/1
200 TABLET, FILM COATED ORAL 2 TIMES DAILY WITH MEALS
Qty: 180 TABLET | Refills: 1 | Status: SHIPPED | OUTPATIENT
Start: 2023-08-18 | End: 2024-02-14

## 2023-08-18 RX ORDER — METHOTREXATE 2.5 MG/1
TABLET ORAL
Qty: 96 TABLET | Refills: 1 | Status: SHIPPED | OUTPATIENT
Start: 2023-08-18

## 2023-08-29 ENCOUNTER — TELEPHONE (OUTPATIENT)
Dept: RHEUMATOLOGY | Facility: CLINIC | Age: 48
End: 2023-08-29

## 2023-08-29 DIAGNOSIS — M77.11 LATERAL EPICONDYLITIS OF RIGHT ELBOW: Primary | ICD-10-CM

## 2023-08-29 NOTE — TELEPHONE ENCOUNTER
----- Message from Miguel Farris MD sent at 8/29/2023 11:47 AM EDT -----  Please let her know the XR is normal and her symptoms are suggestive of tennis elbow as we discussed. I recommend a referral to PT, if she is interested let me know and I will place the order.

## 2023-09-19 ENCOUNTER — OFFICE VISIT (OUTPATIENT)
Dept: FAMILY MEDICINE CLINIC | Facility: CLINIC | Age: 48
End: 2023-09-19
Payer: COMMERCIAL

## 2023-09-19 VITALS
HEART RATE: 62 BPM | WEIGHT: 159 LBS | TEMPERATURE: 98.4 F | HEIGHT: 67 IN | DIASTOLIC BLOOD PRESSURE: 62 MMHG | OXYGEN SATURATION: 96 % | BODY MASS INDEX: 24.96 KG/M2 | RESPIRATION RATE: 14 BRPM | SYSTOLIC BLOOD PRESSURE: 90 MMHG

## 2023-09-19 DIAGNOSIS — M54.50 ACUTE BILATERAL LOW BACK PAIN WITHOUT SCIATICA: Primary | ICD-10-CM

## 2023-09-19 DIAGNOSIS — Z98.1 S/P LUMBAR SPINAL FUSION: ICD-10-CM

## 2023-09-19 DIAGNOSIS — M32.9 LUPUS (HCC): ICD-10-CM

## 2023-09-19 LAB
SL AMB  POCT GLUCOSE, UA: NORMAL
SL AMB LEUKOCYTE ESTERASE,UA: NORMAL
SL AMB POCT BILIRUBIN,UA: NORMAL
SL AMB POCT BLOOD,UA: NORMAL
SL AMB POCT CLARITY,UA: CLEAR
SL AMB POCT COLOR,UA: YELLOW
SL AMB POCT KETONES,UA: NORMAL
SL AMB POCT NITRITE,UA: NORMAL
SL AMB POCT PH,UA: 5
SL AMB POCT SPECIFIC GRAVITY,UA: 1.01
SL AMB POCT URINE PROTEIN: NORMAL
SL AMB POCT UROBILINOGEN: NORMAL

## 2023-09-19 PROCEDURE — 99214 OFFICE O/P EST MOD 30 MIN: CPT | Performed by: STUDENT IN AN ORGANIZED HEALTH CARE EDUCATION/TRAINING PROGRAM

## 2023-09-19 PROCEDURE — 81003 URINALYSIS AUTO W/O SCOPE: CPT | Performed by: STUDENT IN AN ORGANIZED HEALTH CARE EDUCATION/TRAINING PROGRAM

## 2023-09-19 RX ORDER — NAPROXEN 500 MG/1
500 TABLET ORAL 2 TIMES DAILY WITH MEALS
Qty: 28 TABLET | Refills: 0 | Status: SHIPPED | OUTPATIENT
Start: 2023-09-19 | End: 2023-10-03

## 2023-09-19 RX ORDER — CYCLOBENZAPRINE HCL 5 MG
5 TABLET ORAL 3 TIMES DAILY PRN
Qty: 20 TABLET | Refills: 0 | Status: SHIPPED | OUTPATIENT
Start: 2023-09-19

## 2023-09-19 NOTE — PROGRESS NOTES
Clinic Visit Note  Gabriela Calderon 50 y.o. female   MRN: [id number]    Assessment and Plan      Diagnoses and all orders for this visit:    Acute bilateral low back pain without sciatica  S/P lumbar spinal fusion  Urine analysis unremarkable, appears to be MSK muscle spasm etiology, recommending anti-inflammatory with naproxen, patient has done well on this previously, will add Flexeril to help with muscle spasms, heat to area, rest, if symptoms worsen or not improving reevaluation in office recommended for possible steroid injection.  -     POCT urine dip auto non-scope  -     naproxen (NAPROSYN) 500 mg tablet; Take 1 tablet (500 mg total) by mouth 2 (two) times a day with meals for 14 days  -     cyclobenzaprine (FLEXERIL) 5 mg tablet; Take 1 tablet (5 mg total) by mouth 3 (three) times a day as needed for muscle spasms    Lupus ([address])  Continue medical management with rheumatology, stable disease    My impressions and treatment recommendations were discussed in detail with the patient who verbalized understanding and had no further questions. Discharge instructions were provided. Subjective     Chief Complaint: Acute care visit    History of Present Illness:    Patient is a pleasant 57-year-old female coming in for acute care visit secondary to lower back pain, s/p lumbar fusion multiple years ago, no trauma to the area, no red flag symptoms. The following portions of the patient's history were reviewed and updated as appropriate: allergies, current medications, past family history, past medical history, past social history, past surgical history and problem list.    REVIEW OF SYSTEMS:  A complete 12-point review of systems is negative other than that noted in the HPI. Review of Systems   Constitutional: Negative for chills, fatigue and fever. Respiratory: Negative for cough, shortness of breath and wheezing. Cardiovascular: Negative for chest pain, palpitations and leg swelling.    Musculoskeletal: Positive for back pain. Negative for neck pain. Neurological: Negative for dizziness and headaches.          Current Outpatient Medications:   •  albuterol (PROVENTIL HFA,VENTOLIN HFA) 90 mcg/act inhaler, Inhale 1 puff every 6 (six) hours as needed for wheezing, Disp: 18 g, Rfl: 4  •  Biotin 10 MG TABS, Take by mouth, Disp: , Rfl:   •  cholecalciferol (VITAMIN D3) 1,000 units tablet, Take 1,000 Units by mouth daily 5000 unit daily , Disp: , Rfl:   •  cyclobenzaprine (FLEXERIL) 5 mg tablet, Take 1 tablet (5 mg total) by mouth 3 (three) times a day as needed for muscle spasms, Disp: 20 tablet, Rfl: 0  •  folic acid (FOLVITE) 1 mg tablet, Take 3 tablets (3 mg total) by mouth daily, Disp: 270 tablet, Rfl: 3  •  hydroxychloroquine (PLAQUENIL) 200 mg tablet, Take 1 tablet (200 mg total) by mouth 2 (two) times a day with meals, Disp: 180 tablet, Rfl: 1  •  ibuprofen (MOTRIN) 600 mg tablet, Take 1 tablet (600 mg total) by mouth every 8 (eight) hours as needed for mild pain, Disp: 30 tablet, Rfl: 0  •  methotrexate 2.5 MG tablet, TAKE 8 TABS ONCE WEEKLY., Disp: 96 tablet, Rfl: 1  •  naproxen (NAPROSYN) 500 mg tablet, Take 1 tablet (500 mg total) by mouth 2 (two) times a day with meals for 14 days, Disp: 28 tablet, Rfl: 0  •  Omega-3 Fatty Acids (FISH OIL) 1,000 mg, Take 1,000 mg by mouth daily, Disp: , Rfl:   •  oxymetazoline (Vicks Sinex 12 Hour Decongest) 0.05 % nasal spray, 2 sprays by Each Nare route 2 (two) times a day, Disp: , Rfl:   •  SUMAtriptan (IMITREX) 100 mg tablet, TAKE 1 TABLET (100 MG TOTAL) BY MOUTH ONCE AS NEEDED FOR MIGRAINE FOR UP TO 1 DOSE TAKE WHEN EXPERI*, Disp: 9 tablet, Rfl: 0  •  diazepam (VALIUM) 5 mg tablet, , Disp: , Rfl:   Past Medical History:   Diagnosis Date   • Acquired ankle/foot deformity     last assessed: 8/29/2017   • Anxiety    • Chronic pain disorder     right foot plantar   • 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   • Dysfunctional uterine bleeding     Last assessed: 1/9/2018   • Headache    • Lupus ([address])    • Mass of knee     last assessed: 2014   • Mild acid reflux    • Paronychia of finger     last assessed: 2013   • Paronychia of toe     last asssessed: 2016.  right foot   • Patellofemoral dysfunction     last assessed: 3/21/2014   • PCOS (polycystic ovarian syndrome) 12/15/2007   • Pes planus, congenital     last assessed: 2017   • Plantar fasciitis of right foot    • Plantar fibromatosis     last assessed: 2017   • PONV (postoperative nausea and vomiting)    • Trichomoniasis     last assessed: 2015   • Vaginal candidiasis     last assessed: 2016   • Varicose veins of lower extremity     last assessed: 3/5/2014   • Vertigo     last assessed: 3/5/2014   • Viral gastroenteritis     last assessed: 2015     Past Surgical History:   Procedure Laterality Date   • BACK SURGERY      discectomy lumbar   •  SECTION      x2 bikini   • PILONIDAL CYST EXCISION     • [place] HYSTEROSCOPY BX ENDOMETRIUM&/POLYPC W/WO D&C N/A 10/18/2017    Procedure: HYSTEROSCOPY AND FRACTIONAL DILATATION AND CURRETTAGE;  Surgeon: Brittany Ibarra MD;  Location: [facility] MAIN OR;  Service: Gynecology   • TUBAL LIGATION     • WISDOM TOOTH EXTRACTION       Social History     Socioeconomic History   • Marital status: Single     Spouse name: Not on file   • Number of children: Not on file   • Years of education: Not on file   • Highest education level: Not on file   Occupational History   • Not on file   Tobacco Use   • Smoking status: Former     Packs/day: 0.50     Years: 20.00     Total pack years: 10.00     Types: Cigarettes     Quit date:      Years since quittin.7   • Smokeless tobacco: Never   Vaping Use   • Vaping Use: Never used   Substance and Sexual Activity   • Alcohol use: Yes     Comment: occ   • Drug use: No   • Sexual activity: Not on file   Other Topics Concern   • Not on file   Social History Narrative    Daily cola consumption    Daily Tea Consumption     Social Determinants of Health     Financial Resource Strain: Not on file   Food Insecurity: Not on file   Transportation Needs: Not on file   Physical Activity: Not on file   Stress: Not on file   Social Connections: Not on file   Intimate Partner Violence: Not on file   Housing Stability: Not on file     Family History   Problem Relation Age of Onset   • Cancer Mother         skin , gallbladder   • Liver disease Mother    • Hypertension Father    • Heart disease Sister    • No Known Problems Daughter    • No Known Problems Maternal Grandmother    • No Known Problems Maternal Grandfather    • No Known Problems Paternal Grandmother    • No Known Problems Paternal Grandfather    • Cancer Brother         skin   • No Known Problems Son    • No Known Problems Son    • No Known Problems Maternal Aunt    • Colon cancer Maternal Uncle    • No Known Problems Paternal Aunt    • No Known Problems Paternal Uncle    • Arthritis Family    • Lung cancer Family    • Uterine cancer Family    • Breast cancer Cousin 28   • ADD / ADHD Neg Hx    • Anesthesia problems Neg Hx    • Clotting disorder Neg Hx    • Collagen disease Neg Hx    • Diabetes Neg Hx    • Dislocations Neg Hx    • Learning disabilities Neg Hx    • Neurological problems Neg Hx    • Osteoporosis Neg Hx    • Rheumatologic disease Neg Hx    • Scoliosis Neg Hx    • Vascular Disease Neg Hx      Allergies   Allergen Reactions   • Azithromycin GI Intolerance       Objective     Vitals:    09/19/23 0903   BP: 90/62   BP Location: Left arm   Patient Position: Sitting   Cuff Size: Standard   Pulse: 62   Resp: 14   Temp: 98.4 °F (36.9 °C)   TempSrc: Temporal   SpO2: 96%   Weight: 72.1 kg (159 lb)   Height: 5' 7" (1.702 m)       Physical Exam:     GENERAL: NAD, pleasant   HEENT:  NC/AT, PERRL, EOMI, no scleral icterus  CARDIAC:  RRR, +S1/S2, no S3/S4 appreciated, no m/g/r  PULMONARY:  CTA B/L, no wheezing/rales/rhonci, non-labored breathing  ABDOMEN:  Soft, NT/ND, no rebound/guarding/rigidity  Extremities:. No edema, cyanosis, or clubbing  Musculoskeletal:  Full range of motion intact in all extremities, significant hypertonicity T12-L3 paraspinal muscles bilaterally  NEUROLOGIC: Grossly intact, no focal deficits  SKIN:  No rashes or erythema noted on exposed skin  Psych: Normal affect, mood stable    ==  PLEASE NOTE:  This encounter was completed utilizing the [employer] Direct Speech Voice Recognition Software. Grammatical errors, random word insertions, pronoun errors and incomplete sentences are occasional consequences of the system due to software limitations, ambient noise and hardware issues. These may be missed by proof reading prior to affixing electronic signature. Any questions or concerns about the content, text or information contained within the body of this dictation should be directly addressed to the physician for clarification. Please do not hesitate to call me directly if you have any any questions or concerns.     DO Trae Kenny Internal Medicine   [facility]

## 2023-10-03 ENCOUNTER — OFFICE VISIT (OUTPATIENT)
Dept: FAMILY MEDICINE CLINIC | Facility: CLINIC | Age: 48
End: 2023-10-03
Payer: COMMERCIAL

## 2023-10-03 VITALS
SYSTOLIC BLOOD PRESSURE: 88 MMHG | WEIGHT: 158 LBS | DIASTOLIC BLOOD PRESSURE: 56 MMHG | HEART RATE: 80 BPM | BODY MASS INDEX: 24.8 KG/M2 | TEMPERATURE: 99.1 F | RESPIRATION RATE: 16 BRPM | HEIGHT: 67 IN

## 2023-10-03 DIAGNOSIS — F41.1 GENERALIZED ANXIETY DISORDER: ICD-10-CM

## 2023-10-03 DIAGNOSIS — J02.9 SORE THROAT: ICD-10-CM

## 2023-10-03 DIAGNOSIS — R51.9 ACUTE NONINTRACTABLE HEADACHE, UNSPECIFIED HEADACHE TYPE: ICD-10-CM

## 2023-10-03 DIAGNOSIS — M32.9 LUPUS (HCC): ICD-10-CM

## 2023-10-03 DIAGNOSIS — R52 BODY ACHES: ICD-10-CM

## 2023-10-03 DIAGNOSIS — R50.9 FEVER, LOW GRADE: ICD-10-CM

## 2023-10-03 DIAGNOSIS — J03.00 ACUTE NON-RECURRENT STREPTOCOCCAL TONSILLITIS: Primary | ICD-10-CM

## 2023-10-03 LAB
S PYO AG THROAT QL: NEGATIVE
SARS-COV-2 AG UPPER RESP QL IA: NEGATIVE
VALID CONTROL: NORMAL

## 2023-10-03 PROCEDURE — 87880 STREP A ASSAY W/OPTIC: CPT | Performed by: STUDENT IN AN ORGANIZED HEALTH CARE EDUCATION/TRAINING PROGRAM

## 2023-10-03 PROCEDURE — 99214 OFFICE O/P EST MOD 30 MIN: CPT | Performed by: STUDENT IN AN ORGANIZED HEALTH CARE EDUCATION/TRAINING PROGRAM

## 2023-10-03 PROCEDURE — 87811 SARS-COV-2 COVID19 W/OPTIC: CPT | Performed by: STUDENT IN AN ORGANIZED HEALTH CARE EDUCATION/TRAINING PROGRAM

## 2023-10-03 RX ORDER — METHYLPREDNISOLONE 4 MG/1
TABLET ORAL
Qty: 21 EACH | Refills: 0 | Status: SHIPPED | OUTPATIENT
Start: 2023-10-03

## 2023-10-03 RX ORDER — AMOXICILLIN AND CLAVULANATE POTASSIUM 875; 125 MG/1; MG/1
1 TABLET, FILM COATED ORAL EVERY 12 HOURS SCHEDULED
Qty: 14 TABLET | Refills: 0 | Status: SHIPPED | OUTPATIENT
Start: 2023-10-03 | End: 2023-10-10

## 2023-10-03 NOTE — PROGRESS NOTES
Clinic Visit Note  Jocelyn Darden 50 y.o. female   MRN: [id number]    Assessment and Plan      Diagnoses and all orders for this visit:    Acute non-recurrent streptococcal tonsillitis  Recommend antibiotic therapy with steroid anti-inflammatory relief, if symptoms worsen or not improving reevaluation recommended. -     amoxicillin-clavulanate (AUGMENTIN) 875-125 mg per tablet; Take 1 tablet by mouth every 12 (twelve) hours for 7 days  -     methylPREDNISolone 4 MG tablet therapy pack; Use as directed on package    Sore throat  -     POCT Rapid Covid Ag  -     POCT rapid strepA    Acute nonintractable headache, unspecified headache type  -     POCT Rapid Covid Ag  -     POCT rapid strepA    Body aches  -     POCT Rapid Covid Ag  -     POCT rapid strepA    Fever, low grade  -     POCT Rapid Covid Ag  -     POCT rapid strepA    Lupus (HCC)  Methotrexate/folic acid, follow closely with rheumatology    Generalized anxiety disorder  Mood stable on exam today    My impressions and treatment recommendations were discussed in detail with the patient who verbalized understanding and had no further questions. Discharge instructions were provided. Subjective     Chief Complaint:  ACV    History of Present Illness:    Patient is a pleasant 55-year-old female coming in for acute care visit secondary to upper respiratory tract infection symptoms, positive sick contacts who have tested positive for strep recently. The following portions of the patient's history were reviewed and updated as appropriate: allergies, current medications, past family history, past medical history, past social history, past surgical history and problem list.    REVIEW OF SYSTEMS:  A complete 12-point review of systems is negative other than that noted in the HPI. Review of Systems   Constitutional: Positive for fatigue. Negative for chills and fever. HENT: Positive for congestion, ear pain, postnasal drip and sore throat.     Respiratory: Positive for cough. Negative for shortness of breath and wheezing. Cardiovascular: Negative for chest pain, palpitations and leg swelling. Neurological: Negative for dizziness and headaches.          Current Outpatient Medications:   •  albuterol (PROVENTIL HFA,VENTOLIN HFA) 90 mcg/act inhaler, Inhale 1 puff every 6 (six) hours as needed for wheezing, Disp: 18 g, Rfl: 4  •  amoxicillin-clavulanate (AUGMENTIN) 875-125 mg per tablet, Take 1 tablet by mouth every 12 (twelve) hours for 7 days, Disp: 14 tablet, Rfl: 0  •  Biotin 10 MG TABS, Take by mouth, Disp: , Rfl:   •  cholecalciferol (VITAMIN D3) 1,000 units tablet, Take 1,000 Units by mouth daily 5000 unit daily , Disp: , Rfl:   •  cyclobenzaprine (FLEXERIL) 5 mg tablet, Take 1 tablet (5 mg total) by mouth 3 (three) times a day as needed for muscle spasms, Disp: 20 tablet, Rfl: 0  •  diazepam (VALIUM) 5 mg tablet, , Disp: , Rfl:   •  folic acid (FOLVITE) 1 mg tablet, Take 3 tablets (3 mg total) by mouth daily, Disp: 270 tablet, Rfl: 3  •  hydroxychloroquine (PLAQUENIL) 200 mg tablet, Take 1 tablet (200 mg total) by mouth 2 (two) times a day with meals, Disp: 180 tablet, Rfl: 1  •  ibuprofen (MOTRIN) 600 mg tablet, Take 1 tablet (600 mg total) by mouth every 8 (eight) hours as needed for mild pain, Disp: 30 tablet, Rfl: 0  •  methotrexate 2.5 MG tablet, TAKE 8 TABS ONCE WEEKLY., Disp: 96 tablet, Rfl: 1  •  methylPREDNISolone 4 MG tablet therapy pack, Use as directed on package, Disp: 21 each, Rfl: 0  •  naproxen (NAPROSYN) 500 mg tablet, Take 1 tablet (500 mg total) by mouth 2 (two) times a day with meals for 14 days, Disp: 28 tablet, Rfl: 0  •  Omega-3 Fatty Acids (FISH OIL) 1,000 mg, Take 1,000 mg by mouth daily, Disp: , Rfl:   •  oxymetazoline (Vicks Sinex 12 Hour Decongest) 0.05 % nasal spray, 2 sprays by Each Nare route 2 (two) times a day, Disp: , Rfl:   •  SUMAtriptan (IMITREX) 100 mg tablet, TAKE 1 TABLET (100 MG TOTAL) BY MOUTH ONCE AS NEEDED FOR MIGRAINE FOR UP TO 1 DOSE TAKE WHEN EXPERI*, Disp: 9 tablet, Rfl: 0  Past Medical History:   Diagnosis Date   • Acquired ankle/foot deformity     last assessed: 2017   • Anxiety    • Chronic pain disorder     right foot plantar   • Dermatomycosis 2008   • Dysfunctional uterine bleeding     Last assessed: 2018   • Headache    • Lupus ([address])    • Mass of knee     last assessed: 2014   • Mild acid reflux    • Paronychia of finger     last assessed: 2013   • Paronychia of toe     last asssessed: 2016.  right foot   • Patellofemoral dysfunction     last assessed: 3/21/2014   • PCOS (polycystic ovarian syndrome) 12/15/2007   • Pes planus, congenital     last assessed: 2017   • Plantar fasciitis of right foot    • Plantar fibromatosis     last assessed: 2017   • PONV (postoperative nausea and vomiting)    • Trichomoniasis     last assessed: 2015   • Vaginal candidiasis     last assessed: 2016   • Varicose veins of lower extremity     last assessed: 3/5/2014   • Vertigo     last assessed: 3/5/2014   • Viral gastroenteritis     last assessed: 2015     Past Surgical History:   Procedure Laterality Date   • BACK SURGERY      discectomy lumbar   •  SECTION      x2 bikini   • PILONIDAL CYST EXCISION     • [place] HYSTEROSCOPY BX ENDOMETRIUM&/POLYPC W/WO D&C N/A 10/18/2017    Procedure: HYSTEROSCOPY AND FRACTIONAL DILATATION AND CURRETTAGE;  Surgeon: Margarita Denson MD;  Location: [facility] MAIN OR;  Service: Gynecology   • TUBAL LIGATION     • WISDOM TOOTH EXTRACTION       Social History     Socioeconomic History   • Marital status: Single     Spouse name: Not on file   • Number of children: Not on file   • Years of education: Not on file   • Highest education level: Not on file   Occupational History   • Not on file   Tobacco Use   • Smoking status: Former     Packs/day: 0.50     Years: 20.00     Total pack years: 10.00     Types: Cigarettes     Start date: 1984 Quit date:      Years since quittin.7   • Smokeless tobacco: Never   Vaping Use   • Vaping Use: Never used   Substance and Sexual Activity   • Alcohol use: Yes     Comment: occ   • Drug use: No   • Sexual activity: Yes     Partners: Male   Other Topics Concern   • Not on file   Social History Narrative    Daily cola consumption    Daily Tea Consumption     Social Determinants of Health     Financial Resource Strain: Not on file   Food Insecurity: Not on file   Transportation Needs: Not on file   Physical Activity: Not on file   Stress: Not on file   Social Connections: Not on file   Intimate Partner Violence: Not on file   Housing Stability: Not on file     Family History   Problem Relation Age of Onset   • Cancer Mother         skin , gallbladder   • Liver disease Mother    • Hypertension Father    • Heart disease Sister    • No Known Problems Daughter    • No Known Problems Maternal Grandmother    • No Known Problems Maternal Grandfather    • No Known Problems Paternal Grandmother    • No Known Problems Paternal Grandfather    • Cancer Brother         skin   • No Known Problems Son    • No Known Problems Son    • No Known Problems Maternal Aunt    • Colon cancer Maternal Uncle    • No Known Problems Paternal Aunt    • No Known Problems Paternal Uncle    • Arthritis Family    • Lung cancer Family    • Uterine cancer Family    • Breast cancer Cousin 28   • ADD / ADHD Neg Hx    • Anesthesia problems Neg Hx    • Clotting disorder Neg Hx    • Collagen disease Neg Hx    • Diabetes Neg Hx    • Dislocations Neg Hx    • Learning disabilities Neg Hx    • Neurological problems Neg Hx    • Osteoporosis Neg Hx    • Rheumatologic disease Neg Hx    • Scoliosis Neg Hx    • Vascular Disease Neg Hx      Allergies   Allergen Reactions   • Azithromycin GI Intolerance       Objective     Vitals:    10/03/23 1151   BP: (!) 88/56   BP Location: Left arm   Patient Position: Sitting   Cuff Size: Large   Pulse: 80   Resp: 16 Temp: 99.1 °F (37.3 °C)   Weight: 71.7 kg (158 lb)   Height: 5' 7" (1.702 m)       Physical Exam:     GENERAL: NAD, pleasant   HEENT:  NC/AT, PERRL, EOMI, no scleral icterus, pharynx erythema with positive exudate on tonsils  CARDIAC:  RRR, +S1/S2, no S3/S4 appreciated, no m/g/r  PULMONARY:  CTA B/L, no wheezing/rales/rhonci, non-labored breathing  ABDOMEN:  Soft, NT/ND, no rebound/guarding/rigidity  Extremities:. No edema, cyanosis, or clubbing  Musculoskeletal:  Full range of motion intact in all extremities   NEUROLOGIC: Grossly intact, no focal deficits  SKIN:  No rashes or erythema noted on exposed skin  Psych: Normal affect, mood stable    ==  PLEASE NOTE:  This encounter was completed utilizing the [employer] Direct Speech Voice Recognition Software. Grammatical errors, random word insertions, pronoun errors and incomplete sentences are occasional consequences of the system due to software limitations, ambient noise and hardware issues. These may be missed by proof reading prior to affixing electronic signature. Any questions or concerns about the content, text or information contained within the body of this dictation should be directly addressed to the physician for clarification. Please do not hesitate to call me directly if you have any any questions or concerns.     DO Hayley Fournier Nearing Internal Medicine   [facility]

## 2023-10-31 ENCOUNTER — OFFICE VISIT (OUTPATIENT)
Dept: FAMILY MEDICINE CLINIC | Facility: CLINIC | Age: 48
End: 2023-10-31
Payer: COMMERCIAL

## 2023-10-31 VITALS
HEART RATE: 72 BPM | HEIGHT: 67 IN | BODY MASS INDEX: 25.3 KG/M2 | WEIGHT: 161.2 LBS | DIASTOLIC BLOOD PRESSURE: 70 MMHG | RESPIRATION RATE: 16 BRPM | TEMPERATURE: 97.1 F | SYSTOLIC BLOOD PRESSURE: 110 MMHG | OXYGEN SATURATION: 98 %

## 2023-10-31 DIAGNOSIS — R05.1 ACUTE COUGH: Primary | ICD-10-CM

## 2023-10-31 PROCEDURE — 99213 OFFICE O/P EST LOW 20 MIN: CPT | Performed by: FAMILY MEDICINE

## 2023-10-31 RX ORDER — DOXYCYCLINE HYCLATE 100 MG/1
100 CAPSULE ORAL EVERY 12 HOURS SCHEDULED
Qty: 14 CAPSULE | Refills: 0 | Status: SHIPPED | OUTPATIENT
Start: 2023-10-31 | End: 2023-11-07

## 2023-10-31 RX ORDER — DEXTROMETHORPHAN HYDROBROMIDE AND PROMETHAZINE HYDROCHLORIDE 15; 6.25 MG/5ML; MG/5ML
5 SYRUP ORAL 4 TIMES DAILY PRN
Qty: 118 ML | Refills: 0 | Status: SHIPPED | OUTPATIENT
Start: 2023-10-31

## 2023-10-31 NOTE — PROGRESS NOTES
Chief Complaint   Patient presents with   • Cough     Cough ,Nasal congestion ,headache  not sleeping         Patient ID: Concepcion Perales is a 50 y.o. female. Cough  This is a new problem. The current episode started 1 to 4 weeks ago. The problem has been unchanged. The problem occurs every few minutes. The cough is Non-productive. Associated symptoms include headaches, nasal congestion, postnasal drip and rhinorrhea. Pertinent negatives include no chest pain, chills, ear congestion, ear pain, fever, heartburn, hemoptysis, myalgias, rash, sore throat, shortness of breath, sweats, weight loss or wheezing. The symptoms are aggravated by lying down. She has tried OTC cough suppressant for the symptoms. The treatment provided no relief. There is no history of asthma, bronchitis, COPD, emphysema, environmental allergies or pneumonia. The following portions of the patient's history were reviewed and updated as appropriate: allergies, current medications, past family history, past medical history, past social history, past surgical history and problem list.    Review of Systems   Constitutional:  Negative for chills, fever and weight loss. HENT:  Positive for postnasal drip and rhinorrhea. Negative for ear pain and sore throat. Respiratory:  Positive for cough. Negative for hemoptysis, shortness of breath and wheezing. Cardiovascular:  Negative for chest pain. Gastrointestinal: Negative. Negative for heartburn. Musculoskeletal:  Negative for myalgias. Skin:  Negative for rash. Allergic/Immunologic: Negative for environmental allergies. Neurological:  Positive for headaches.        Current Outpatient Medications   Medication Sig Dispense Refill   • albuterol (PROVENTIL HFA,VENTOLIN HFA) 90 mcg/act inhaler Inhale 1 puff every 6 (six) hours as needed for wheezing 18 g 4   • Biotin 10 MG TABS Take by mouth     • cholecalciferol (VITAMIN D3) 1,000 units tablet Take 1,000 Units by mouth daily 5000 unit daily      • diazepam (VALIUM) 5 mg tablet      • folic acid (FOLVITE) 1 mg tablet Take 3 tablets (3 mg total) by mouth daily 270 tablet 3   • hydroxychloroquine (PLAQUENIL) 200 mg tablet Take 1 tablet (200 mg total) by mouth 2 (two) times a day with meals 180 tablet 1   • methotrexate 2.5 MG tablet TAKE 8 TABS ONCE WEEKLY. 96 tablet 1   • Omega-3 Fatty Acids (FISH OIL) 1,000 mg Take 1,000 mg by mouth daily     • oxymetazoline (Vicks Sinex 12 Hour Decongest) 0.05 % nasal spray 2 sprays by Each Nare route 2 (two) times a day     • SUMAtriptan (IMITREX) 100 mg tablet TAKE 1 TABLET (100 MG TOTAL) BY MOUTH ONCE AS NEEDED FOR MIGRAINE FOR UP TO 1 DOSE TAKE WHEN EXPERI* 9 tablet 0     No current facility-administered medications for this visit. Objective:    /70 (BP Location: Left arm, Patient Position: Sitting, Cuff Size: Large)   Pulse 72   Temp (!) 97.1 °F (36.2 °C)   Resp 16   Ht 5' 7" (1.702 m)   Wt 73.1 kg (161 lb 3.2 oz)   LMP 10/21/2023   SpO2 98%   BMI 25.25 kg/m²        Physical Exam  Constitutional:       Appearance: She is not ill-appearing. HENT:      Right Ear: Tympanic membrane normal.      Left Ear: Tympanic membrane normal.      Nose: Congestion present. No rhinorrhea. Mouth/Throat:      Pharynx: No oropharyngeal exudate or posterior oropharyngeal erythema. Cardiovascular:      Rate and Rhythm: Normal rate. Pulmonary:      Effort: Pulmonary effort is normal. No respiratory distress. Breath sounds: No wheezing, rhonchi or rales. Neurological:      Mental Status: She is alert. Assessment/Plan:         Diagnoses and all orders for this visit:    Acute cough  -     promethazine-dextromethorphan (PHENERGAN-DM) 6.25-15 mg/5 mL oral syrup; Take 5 mL by mouth 4 (four) times a day as needed for cough  -     doxycycline hyclate (VIBRAMYCIN) 100 mg capsule;  Take 1 capsule (100 mg total) by mouth every 12 (twelve) hours for 7 days          Rto prn Poonam Pedroza MD

## 2023-11-17 ENCOUNTER — OFFICE VISIT (OUTPATIENT)
Dept: FAMILY MEDICINE CLINIC | Facility: CLINIC | Age: 48
End: 2023-11-17
Payer: COMMERCIAL

## 2023-11-17 VITALS
WEIGHT: 160 LBS | HEIGHT: 67 IN | RESPIRATION RATE: 16 BRPM | SYSTOLIC BLOOD PRESSURE: 102 MMHG | TEMPERATURE: 98.7 F | BODY MASS INDEX: 25.11 KG/M2 | HEART RATE: 96 BPM | DIASTOLIC BLOOD PRESSURE: 60 MMHG | OXYGEN SATURATION: 99 %

## 2023-11-17 DIAGNOSIS — J06.9 UPPER RESPIRATORY TRACT INFECTION, UNSPECIFIED TYPE: ICD-10-CM

## 2023-11-17 DIAGNOSIS — R05.8 COUGH WITH CONGESTION OF PARANASAL SINUS: ICD-10-CM

## 2023-11-17 DIAGNOSIS — B37.9 YEAST INFECTION: ICD-10-CM

## 2023-11-17 DIAGNOSIS — R09.81 COUGH WITH CONGESTION OF PARANASAL SINUS: ICD-10-CM

## 2023-11-17 DIAGNOSIS — R52 BODY ACHES: ICD-10-CM

## 2023-11-17 DIAGNOSIS — J02.9 SORE THROAT: Primary | ICD-10-CM

## 2023-11-17 LAB
SL AMB POCT RAPID FLU A: NEGATIVE
SL AMB POCT RAPID FLU B: NEGATIVE

## 2023-11-17 PROCEDURE — 87804 INFLUENZA ASSAY W/OPTIC: CPT | Performed by: FAMILY MEDICINE

## 2023-11-17 PROCEDURE — 99213 OFFICE O/P EST LOW 20 MIN: CPT | Performed by: FAMILY MEDICINE

## 2023-11-17 RX ORDER — AMOXICILLIN AND CLAVULANATE POTASSIUM 875; 125 MG/1; MG/1
1 TABLET, FILM COATED ORAL EVERY 12 HOURS SCHEDULED
Qty: 14 TABLET | Refills: 0 | Status: SHIPPED | OUTPATIENT
Start: 2023-11-17 | End: 2023-11-24

## 2023-11-17 RX ORDER — FLUCONAZOLE 150 MG/1
150 TABLET ORAL ONCE
Qty: 2 TABLET | Refills: 0 | Status: SHIPPED | OUTPATIENT
Start: 2023-11-17 | End: 2023-11-17

## 2023-11-17 NOTE — PROGRESS NOTES
Assessment/Plan:    1. Sore throat  Assessment & Plan:  Home COVID 19 test negative  Office rapid flu A&B negative      Orders:  -     POCT rapid flu A and B  -     amoxicillin-clavulanate (AUGMENTIN) 875-125 mg per tablet; Take 1 tablet by mouth every 12 (twelve) hours for 7 days    2. Cough with congestion of paranasal sinus  -     POCT rapid flu A and B  -     amoxicillin-clavulanate (AUGMENTIN) 875-125 mg per tablet; Take 1 tablet by mouth every 12 (twelve) hours for 7 days    3. Body aches  -     POCT rapid flu A and B    4. Upper respiratory tract infection, unspecified type    5. Yeast infection  -     fluconazole (DIFLUCAN) 150 mg tablet; Take 1 tablet (150 mg total) by mouth once for 1 dose Rpt dose in 3-5 days if needed      Rest/fluids/otc analgesics/immune supplements    Subjective:      Patient ID: Gisselle Davis is a 50 y.o. female. Chief Complaint   Patient presents with   • Sore Throat     Cough , congestion , body aches , sinus pressure , headache ,chills started lastnight - covid negative this morning        Cough  This is a recurrent problem. The current episode started 1 to 4 weeks ago. The problem has been gradually worsening. Associated symptoms include headaches, myalgias, nasal congestion, postnasal drip, rhinorrhea and a sore throat. Pertinent negatives include no hemoptysis or shortness of breath. She has tried rest, prescription cough suppressant and oral steroids (abx) for the symptoms. Her past medical history is significant for bronchitis and environmental allergies. The following portions of the patient's history were reviewed and updated as appropriate: allergies, current medications, past family history, past medical history, past social history, past surgical history and problem list.    Review of Systems   HENT:  Positive for postnasal drip, rhinorrhea and sore throat. Respiratory:  Positive for cough. Negative for hemoptysis and shortness of breath.     Musculoskeletal: Positive for myalgias. Allergic/Immunologic: Positive for environmental allergies. Neurological:  Positive for headaches. Current Outpatient Medications   Medication Sig Dispense Refill   • albuterol (PROVENTIL HFA,VENTOLIN HFA) 90 mcg/act inhaler Inhale 1 puff every 6 (six) hours as needed for wheezing 18 g 4   • amoxicillin-clavulanate (AUGMENTIN) 875-125 mg per tablet Take 1 tablet by mouth every 12 (twelve) hours for 7 days 14 tablet 0   • Biotin 10 MG TABS Take by mouth     • cholecalciferol (VITAMIN D3) 1,000 units tablet Take 1,000 Units by mouth daily 5000 unit daily      • diazepam (VALIUM) 5 mg tablet      • folic acid (FOLVITE) 1 mg tablet Take 3 tablets (3 mg total) by mouth daily 270 tablet 3   • hydroxychloroquine (PLAQUENIL) 200 mg tablet Take 1 tablet (200 mg total) by mouth 2 (two) times a day with meals 180 tablet 1   • methotrexate 2.5 MG tablet TAKE 8 TABS ONCE WEEKLY. 96 tablet 1   • Omega-3 Fatty Acids (FISH OIL) 1,000 mg Take 1,000 mg by mouth daily     • oxymetazoline (Vicks Sinex 12 Hour Decongest) 0.05 % nasal spray 2 sprays by Each Nare route 2 (two) times a day     • promethazine-dextromethorphan (PHENERGAN-DM) 6.25-15 mg/5 mL oral syrup Take 5 mL by mouth 4 (four) times a day as needed for cough 118 mL 0   • SUMAtriptan (IMITREX) 100 mg tablet TAKE 1 TABLET (100 MG TOTAL) BY MOUTH ONCE AS NEEDED FOR MIGRAINE FOR UP TO 1 DOSE TAKE WHEN EXPERI* 9 tablet 0     No current facility-administered medications for this visit. Objective:    /60 (BP Location: Left arm, Patient Position: Sitting, Cuff Size: Large)   Pulse 96   Temp 98.7 °F (37.1 °C)   Resp 16   Ht 5' 7" (1.702 m)   Wt 72.6 kg (160 lb)   LMP 10/21/2023   SpO2 99%   BMI 25.06 kg/m²        Physical Exam  Vitals and nursing note reviewed. Constitutional:       General: She is in acute distress. Appearance: She is ill-appearing.    HENT:      Ears:      Comments: TMs dull b/l     Nose: Congestion present. Mouth/Throat:      Mouth: No oral lesions. Pharynx: Oropharyngeal exudate and posterior oropharyngeal erythema present. Cardiovascular:      Rate and Rhythm: Normal rate and regular rhythm. Pulmonary:      Effort: Pulmonary effort is normal. No respiratory distress. Comments: Occ coarse rhonchi-no localiztaion  Abdominal:      General: Bowel sounds are normal.      Palpations: Abdomen is soft. Tenderness: There is no abdominal tenderness. Lymphadenopathy:      Cervical: Cervical adenopathy present. Skin:     General: Skin is warm and dry. Findings: No rash. Neurological:      Mental Status: She is alert.            Florentino Sacks, MD

## 2023-11-19 PROBLEM — R05.8 COUGH WITH CONGESTION OF PARANASAL SINUS: Status: ACTIVE | Noted: 2023-11-19

## 2023-11-19 PROBLEM — R52 BODY ACHES: Status: ACTIVE | Noted: 2023-11-19

## 2023-11-19 PROBLEM — R09.81 COUGH WITH CONGESTION OF PARANASAL SINUS: Status: ACTIVE | Noted: 2023-11-19

## 2023-11-19 PROBLEM — R05.9 COUGH: Status: ACTIVE | Noted: 2023-11-19

## 2024-01-18 PROBLEM — R09.81 COUGH WITH CONGESTION OF PARANASAL SINUS: Status: RESOLVED | Noted: 2023-11-19 | Resolved: 2024-01-18

## 2024-01-18 PROBLEM — R05.8 COUGH WITH CONGESTION OF PARANASAL SINUS: Status: RESOLVED | Noted: 2023-11-19 | Resolved: 2024-01-18

## 2024-01-18 PROBLEM — R05.9 COUGH: Status: RESOLVED | Noted: 2023-11-19 | Resolved: 2024-01-18

## 2024-02-08 ENCOUNTER — OFFICE VISIT (OUTPATIENT)
Dept: AUDIOLOGY | Facility: CLINIC | Age: 49
End: 2024-02-08
Payer: COMMERCIAL

## 2024-02-08 DIAGNOSIS — H90.3 SENSORY HEARING LOSS, BILATERAL: Primary | ICD-10-CM

## 2024-02-08 PROCEDURE — 92567 TYMPANOMETRY: CPT | Performed by: AUDIOLOGIST

## 2024-02-08 PROCEDURE — 92593 HB HEARING AID CHECK BOTH EARS: CPT | Performed by: AUDIOLOGIST

## 2024-02-08 PROCEDURE — 92557 COMPREHENSIVE HEARING TEST: CPT | Performed by: AUDIOLOGIST

## 2024-02-12 LAB
ALBUMIN SERPL-MCNC: 4.5 G/DL (ref 3.9–4.9)
ALBUMIN/GLOB SERPL: 1.8 {RATIO} (ref 1.2–2.2)
ALP SERPL-CCNC: 54 IU/L (ref 44–121)
ALT SERPL-CCNC: 7 IU/L (ref 0–32)
APPEARANCE UR: CLEAR
AST SERPL-CCNC: 20 IU/L (ref 0–40)
BACTERIA URNS QL MICRO: NORMAL
BASOPHILS # BLD AUTO: 0.1 X10E3/UL (ref 0–0.2)
BASOPHILS NFR BLD AUTO: 1 %
BILIRUB SERPL-MCNC: 0.2 MG/DL (ref 0–1.2)
BILIRUB UR QL STRIP: NEGATIVE
BUN SERPL-MCNC: 11 MG/DL (ref 6–24)
BUN/CREAT SERPL: 13 (ref 9–23)
C-ANCA TITR SER IF: NORMAL TITER
C3 SERPL-MCNC: 128 MG/DL (ref 82–167)
C4 SERPL-MCNC: 20 MG/DL (ref 12–38)
CALCIUM SERPL-MCNC: 9.2 MG/DL (ref 8.7–10.2)
CASTS URNS QL MICRO: NORMAL /LPF
CHLORIDE SERPL-SCNC: 103 MMOL/L (ref 96–106)
CO2 SERPL-SCNC: 24 MMOL/L (ref 20–29)
COLOR UR: YELLOW
CREAT SERPL-MCNC: 0.85 MG/DL (ref 0.57–1)
CREAT UR-MCNC: 109.3 MG/DL
CRP SERPL-MCNC: <1 MG/L (ref 0–10)
DSDNA AB SER-ACNC: <1 IU/ML (ref 0–9)
EGFR: 84 ML/MIN/1.73
EOSINOPHIL # BLD AUTO: 0.1 X10E3/UL (ref 0–0.4)
EOSINOPHIL NFR BLD AUTO: 1 %
EPI CELLS #/AREA URNS HPF: NORMAL /HPF (ref 0–10)
ERYTHROCYTE [DISTWIDTH] IN BLOOD BY AUTOMATED COUNT: 13.1 % (ref 11.7–15.4)
ERYTHROCYTE [SEDIMENTATION RATE] IN BLOOD BY WESTERGREN METHOD: 6 MM/HR (ref 0–32)
GLOBULIN SER-MCNC: 2.5 G/DL (ref 1.5–4.5)
GLUCOSE SERPL-MCNC: 87 MG/DL (ref 70–99)
GLUCOSE UR QL: NEGATIVE
HCT VFR BLD AUTO: 37.7 % (ref 34–46.6)
HGB BLD-MCNC: 12.7 G/DL (ref 11.1–15.9)
HGB UR QL STRIP: NEGATIVE
IMM GRANULOCYTES # BLD: 0 X10E3/UL (ref 0–0.1)
IMM GRANULOCYTES NFR BLD: 0 %
KETONES UR QL STRIP: NEGATIVE
LEUKOCYTE ESTERASE UR QL STRIP: NEGATIVE
LYMPHOCYTES # BLD AUTO: 2.4 X10E3/UL (ref 0.7–3.1)
LYMPHOCYTES NFR BLD AUTO: 33 %
MCH RBC QN AUTO: 30.2 PG (ref 26.6–33)
MCHC RBC AUTO-ENTMCNC: 33.7 G/DL (ref 31.5–35.7)
MCV RBC AUTO: 90 FL (ref 79–97)
MICRO URNS: NORMAL
MICRO URNS: NORMAL
MONOCYTES # BLD AUTO: 0.5 X10E3/UL (ref 0.1–0.9)
MONOCYTES NFR BLD AUTO: 7 %
MYELOPEROXIDASE AB SER IA-ACNC: <0.2 UNITS (ref 0–0.9)
NEUTROPHILS # BLD AUTO: 4.1 X10E3/UL (ref 1.4–7)
NEUTROPHILS NFR BLD AUTO: 58 %
NITRITE UR QL STRIP: NEGATIVE
P-ANCA ATYPICAL TITR SER IF: NORMAL TITER
P-ANCA TITR SER IF: NORMAL TITER
PH UR STRIP: 7 [PH] (ref 5–7.5)
PLATELET # BLD AUTO: 259 X10E3/UL (ref 150–450)
POTASSIUM SERPL-SCNC: 4.2 MMOL/L (ref 3.5–5.2)
PROT SERPL-MCNC: 7 G/DL (ref 6–8.5)
PROT UR QL STRIP: NORMAL
PROT UR-MCNC: 12.3 MG/DL
PROT/CREAT UR: 113 MG/G CREAT (ref 0–200)
PROTEINASE3 AB SER IA-ACNC: <0.2 UNITS (ref 0–0.9)
RBC # BLD AUTO: 4.2 X10E6/UL (ref 3.77–5.28)
RBC #/AREA URNS HPF: NORMAL /HPF (ref 0–2)
SODIUM SERPL-SCNC: 141 MMOL/L (ref 134–144)
SP GR UR: 1.02 (ref 1–1.03)
UROBILINOGEN UR STRIP-ACNC: 0.2 MG/DL (ref 0.2–1)
WBC # BLD AUTO: 7.2 X10E3/UL (ref 3.4–10.8)
WBC #/AREA URNS HPF: NORMAL /HPF (ref 0–5)

## 2024-02-12 NOTE — PROGRESS NOTES
"     Hearing Aid Visit:    Name:  Belén Piper  :  1975  Age:  48 y.o.  MRN:  [id number]  Date of Evaluation: 24    Belén Piper was seen today (2024) for a(n) in-warranty hearing aid check of her bilateral hearing aids. Today, Ms. Piper reported that she is having trouble \"getting used to them\" and that the aids are \"slipping\".    Device Information:       Left Device Right Device   Hearing Aid Make: [employer] [place] [employer]   Hearing Aid Model: MORE 1 miniRITE MORE 1 miniRITE   Serial Number: [id number]   Repair Warranty Expiration Date: 2026   Loss/Damage Warranty Expiration Date:  2026    Length: 1 1    Output: 60 60   Wax System: Pro Wax miniFIT Pro Wax miniFIT   Dome Size/Style: 8mm open 8mm open   Battery: Lithium-ion Rechargeable Lithium-ion Rechargeable   [place] Serial Number: N/A N/A   [place] Warranty Date:  N/A N/A      Serial Number: [id number]  Accessories: N/A    Action:  Added canal locks   Updated firmware to 1.4.4   Added bass to settings so they sounded more \"natural\" to her   Cleaned and checked all / aids were in good working order via listening check     Recommendations:   RTO 24 for 6 month hearing aid check under warranty       Jorge Luis Gilliland, CCC-A  Clinical Audiologist  [id number]  [facility] PROFESSIONAL [facility] AUDIOLOGY  [address] [address]  "

## 2024-02-12 NOTE — PROGRESS NOTES
"HEARING EVALUATION    Name:  Belén Piper  :  1975  Age:  48 y.o.   MRN:  [id number]  Date of Evaluation: 24    HISTORY:     Reason for visit:  Annual hearing evaluation     Belén Piper is being seen today at the request of Dr. Ozzy Zamudio for an annual evaluation of hearing.  Patient reports a \"flutter\" feeling in her ear and then she feels vertiginous for up to a minute.  She will have occasional tinnitus which can be one or both ears.  She reports low blood pressure.    Patient denies otalgia and fullness.     EVALUATION:    Otoscopic Evaluation:   Right Ear: Unremarkable, canal clear   Left Ear: Unremarkable, canal clear    Tympanometry:   Right Ear: Type A; normal middle ear pressure and static compliance    Left Ear: Type A; normal middle ear pressure and static compliance     Speech Audiometry:  Speech Reception (SRT)   Right Ear: 15 dB HL   Left Ear: 20 dB HL    Word Recognition Scores (WRS):  Right Ear: excellent (100 % correct)     Left Ear: excellent (100 % correct)   Stimuli: [id number]    Pure Tone Audiometry:  Conventional pure tone audiometry from 250 - 8000 Hz  was obtained with good reliability and revealed the following:     Right Ear: Minimum  sensorineural hearing loss (SNHL)      Left Ear: Minimum  sensorineural hearing loss (SNHL)       *see attached audiogram    RECOMMENDATIONS:  Annual hearing evaluations for monitoring purposes   Follow up with PCP regarding symptoms reported     PATIENT EDUCATION:   The results of today's results and recommendations were reviewed with the patient and her hearing thresholds were explained at length. Treatment options, including amplification and communication strategies, were discussed as appropriate. The patient voiced understanding of her test results. Questions were addressed and the patient was encouraged to contact our department should concerns arise.      Jorge Luis Gilliland, CCC-A  Clinical Audiologist  [id number]  [facility]EL [facility]" PROFESSIONAL [facility] AUDIOLOGY  [address] [address]

## 2024-02-21 NOTE — PROGRESS NOTES
Assessment and Plan:   Ms. Piper a 48-year-old female with history significant for systemic lupus erythematosus and fibromyalgia who presents for a follow-up.  She is currently on hydroxychloroquine 200 mg twice daily, methotrexate 20 mg once weekly with folic acid 3 mg daily and gabapentin 600 mg at bedtime.       # Systemic lupus erythematosus  - Belén presents today for a follow-up of systemic lupus erythematosus and fibromyalgia that was diagnosed in 2017.  Since then she has been on hydroxychloroquine at 200 mg twice daily and methotrexate 20 mg once weekly which has overall kept her stable.  She is not reporting any symptoms today suggestive of activity related to the lupus and reassuringly her recent serologies have all been normal.  She will continue the same regimen unchanged and update lupus related lab monitoring prior to the follow-up visit.  Of note as she has been dealing with upper respiratory tract infection/allergic symptoms I advised her to hold the methotrexate for the next 2 weeks to minimize immunosuppression while she is on antibiotics.  She is aware to hold this in the future as well if she is diagnosed with an infection or is on antibiotics.  I encouraged her to schedule an appointment with ENT given the recurrent sinonasal symptoms.    - She will call ophthalmology to ensure she is up-to-date with annual eye exams and visual field testing.    # Chronic low back pain secondary to lumbar degenerative arthritis  - I encouraged her to rethink the spinal stimulator or interventional procedures and follow-up with spine and pain management for further guidance.    # Right elbow pain secondary to lateral epicondylitis  - I will obtain an x-ray to further evaluate her symptoms but suspect her pain is secondary to lateral epicondylitis.  I advised her to proceed with a conservative approach of resting, bracing, icing as needed and using Tylenol/NSAIDs as needed.  If her symptoms persist a cortisone  injection or physical therapy can also be considered.  If her symptoms need to be pursued further depending on the results of the x-ray I may also obtain an ultrasound to evaluate for an inflammatory arthropathy/cubital tunnel syndrome.      Plan:  Diagnoses and all orders for this visit:    Systemic lupus erythematosus, unspecified SLE type, unspecified organ involvement status (HCC)    Long-term use of Plaquenil    Methotrexate, long term, current use    Fibromyalgia    Primary generalized (osteo)arthritis      Activities as tolerated.   Exercise: try to maintain a low impact exercise regimen as much as possible. Walk for 30 minutes a day for at least 3 days a week.   Continue other medications as prescribed by PCP and other specialists.       RTC in 6 months.        HPI    INITIAL VISIT NOTE (10/2022):  Ms. Piper a 47-year-old female with history significant for systemic lupus erythematosus and fibromyalgia who presents to establish with [facility].  She is currently on hydroxychloroquine 200 mg twice daily, methotrexate 20 mg once weekly with folic acid 1 mg daily and gabapentin 600 mg at bedtime.  She is transferring care from Dr. Laly Wilkerson.  She is self-referred today.       I am unable to access her initial labs but based on her prior rheumatology records she was diagnosed with systemic lupus erythematosus in 2017 when she presented with a positive GLENNA, double-stranded DNA antibody as well as diffuse arthralgias.  Based on labs that I am able to access dating back to 2020 she has presented with a normal GLENNA, double-stranded DNA antibody, inflammatory markers and complements.  She mentions at the time of her diagnosis she was started on hydroxychloroquine 200 mg twice daily which she has been on since then and follows regularly with Ophthalmology.  She did not feel like the hydroxychloroquine really impacted her symptoms and a few months after her diagnosis methotrexate was added on currently  at a dose of 20 mg once weekly.  She reports that this does help to some degree.  She also has a diagnosis of fibromyalgia given the ongoing pain and eventually duloxetine was added but as she developed side effects to this it was discontinued and currently she is on gabapentin 600 mg nightly which helps with the body pain and her sleep.  She was initially prescribed 600 mg twice daily but stopped the morning dose as it was causing excessive drowsiness.  She takes an occasional over-the-counter Advil as needed which does help her.  She reports that she still experiences pain in her hands and knees without any joint swelling.  She describes morning stiffness that takes a few minutes to improve as well as gelling phenomenon.     She reports previously she was experiencing significant hair loss but this has improved since starting biotin.  She does feel warm at times but has not checked her temperature.  Intermittently she will notice a redness appearance on her neck but denies other skin rashes.  At times she has noticed a sun sensitive skin rash arise on her legs.  She thinks that she may manifest with nose and mouth sores but has never visualized this.  She reports a history of 1 episode of colitis many years ago.  She does not experience classic Raynaud's symptoms with color changes on cold exposure but reports that with slight cold exposure she will notice icy feet.     She denies fevers, unintentional weight loss, ongoing alopecia, dry eyes, dry mouth, inflammatory eye disease, psoriasis, swollen glands, pleuritic chest pain, inflammatory bowel disease, blood clots, miscarriages or a family history of autoimmune disease.     I reviewed her most recent labs from August 2022 which showed an unremarkable CBC, CMP, ESR, CRP, C3, C4, double-stranded DNA antibody, GLENNA screen and urinalysis.      2/16/2023:  Patient presents for a follow-up of systemic lupus erythematosus and fibromyalgia.  She is currently on  hydroxychloroquine 200 mg twice daily, methotrexate 20 mg once weekly with folic acid 3 mg daily and gabapentin 600 mg at bedtime.  I reviewed her testing done on 2/2/2023 and this showed a normal GLENNA specificity, ESR, CRP, C3, C4, antiphospholipid antibody testing, urine analysis, urine protein creatinine ratio, rheumatoid factor, anti-CCP antibody, ferritin, HLA-B27 antigen, chronic hepatitis panel, CBC, CMP and thyroid antibody profile.    She is not reporting any new complaints today but is continuing to experience fatigue.  She is a single parent and cares for her 11-year-old twins and 6-year-old child who has ADHD and reports that this keeps her very busy.  She is not reporting any fevers, unintentional weight loss, skin rashes, mouth/nose ulcers, swollen glands, pleuritic chest pain or new joint pains at this time.      8/17/2023:  Patient presents for a follow-up of systemic lupus erythematosus and fibromyalgia.  She is currently on hydroxychloroquine 200 mg twice daily, methotrexate 20 mg once weekly with folic acid 3 mg daily and gabapentin 600 mg at bedtime.  I reviewed her recent labs which showed a normal CBC, CMP, ESR, CRP, C3, C4, double-stranded DNA antibody, urine analysis and urine protein creatinine ratio.    She reports over the past few months she has been seen by her primary care physician multiple times as she continues to experience a sensation like her nose and throat are swollen and irritated.  She has not seen ENT during this timeframe.  She did see an allergist and had allergy testing done which was unremarkable.  She may have received an intramuscular steroid injection in their office which significantly helped with her symptoms but reports she has continued to have recurrent complaints following this.  Most recently she was seen by her PCP and prescribed amoxicillin for 1 week with 2 days remaining.  She reports that this has helped with her symptoms.  She was also prescribed steroids  but has not started this since the amoxicillin has helped.  Of note she has not held the methotrexate during her illness.    She has also been dealing with chronic low back pain and stiffness secondary to lumbar degenerative arthritis.  She was seen by spine and pain management and a spinal cord stimulator was discussed with her but she is hesitant to proceed with this.  She is trying to avoid interventional procedures as [facility] does not use general anesthesia for these procedures and she is anxious about getting them done without anesthesia.     Other than these complaints she reports chronic pain affecting her right elbow which at times can sharply radiate down her forearm.  No additional concerning joint pains.  No swollen joints.  She experiences morning stiffness affecting her diffusely especially in her low back that starts to improve gradually with activities.  She reports chronic redness on her neck which at times can flareup.  Otherwise no fevers, unintentional weight loss, new skin rashes, mouth/nose ulcers, swollen glands or pleuritic chest pain.    She has been tolerating the methotrexate and hydroxychloroquine well and has to check when she had her last annual eye exam.      2/22/2024:    The following portions of the patient's history were reviewed and updated as appropriate: allergies, current medications, past family history, past medical history, past social history, past surgical history and problem list.      Review of Systems  Constitutional: Negative for weight change, fevers, chills, night sweats.  Positive for fatigue.  ENT/Mouth: Negative for hearing changes, ear pain, nasal congestion, sinus pain, hoarseness, sore throat, rhinorrhea, swallowing difficulty.   Eyes: Negative for pain, redness, discharge, vision changes.   Cardiovascular: Negative for chest pain, SOB, palpitations.   Respiratory: Negative for cough, sputum, wheezing, dyspnea.   Gastrointestinal: Negative for nausea,  vomiting, diarrhea, constipation, pain, heartburn.  Genitourinary: Negative for dysuria, urinary frequency, hematuria.   Musculoskeletal: As per HPI.  Skin: Negative for skin rash, color changes.   Neuro: Negative for weakness, numbness, tingling, loss of consciousness.   Psych: Negative for anxiety, depression.   Heme/Lymph: Negative for easy bruising, bleeding, lymphadenopathy.        Past Medical History:   Diagnosis Date    Acquired ankle/foot deformity     last assessed: 2017    Anxiety     Chronic pain disorder     right foot plantar    Dermatomycosis 2008    Dysfunctional uterine bleeding     Last assessed: 2018    Headache     Lupus (HCC)     Mass of knee     last assessed: 2014    Mild acid reflux     Paronychia of finger     last assessed: 2013    Paronychia of toe     last asssessed: 2016. right foot    Patellofemoral dysfunction     last assessed: 3/21/2014    PCOS (polycystic ovarian syndrome) 12/15/2007    Pes planus, congenital     last assessed: 2017    Plantar fasciitis of right foot     Plantar fibromatosis     last assessed: 2017    PONV (postoperative nausea and vomiting)     Trichomoniasis     last assessed: 2015    Vaginal candidiasis     last assessed: 2016    Varicose veins of lower extremity     last assessed: 3/5/2014    Vertigo     last assessed: 3/5/2014    Viral gastroenteritis     last assessed: 2015       Past Surgical History:   Procedure Laterality Date    BACK SURGERY      discectomy lumbar     SECTION      x2 bikini    PILONIDAL CYST EXCISION      PA HYSTEROSCOPY BX ENDOMETRIUM&/POLYPC W/WO D&C N/A 10/18/2017    Procedure: HYSTEROSCOPY AND FRACTIONAL DILATATION AND CURRETTAGE;  Surgeon: Rudy Zaragoza MD;  Location: [facility] MAIN OR;  Service: Gynecology    TUBAL LIGATION  2016    WISDOM TOOTH EXTRACTION         Social History     Socioeconomic History    Marital status: Single     Spouse name: Not on file    Number of  children: Not on file    Years of education: Not on file    Highest education level: Not on file   Occupational History    Not on file   Tobacco Use    Smoking status: Former     Current packs/day: 0.00     Average packs/day: 0.5 packs/day for 25.3 years (12.6 ttl pk-yrs)     Types: Cigarettes     Start date: 1984     Quit date:      Years since quittin.1    Smokeless tobacco: Never   Vaping Use    Vaping status: Never Used   Substance and Sexual Activity    Alcohol use: Yes     Comment: occ    Drug use: No    Sexual activity: Yes     Partners: Male   Other Topics Concern    Not on file   Social History Narrative    Daily cola consumption    Daily Tea Consumption     Social Determinants of Health     Financial Resource Strain: Not on file   Food Insecurity: Not on file   Transportation Needs: Not on file   Physical Activity: Not on file   Stress: Not on file   Social Connections: Not on file   Intimate Partner Violence: Not on file   Housing Stability: Not on file       Family History   Problem Relation Age of Onset    Cancer Mother         skin , gallbladder    Liver disease Mother     Hypertension Father     Heart disease Sister     Cancer Brother         skin    No Known Problems Son     No Known Problems Son     No Known Problems Daughter     No Known Problems Maternal Grandmother     No Known Problems Maternal Grandfather     No Known Problems Paternal Grandmother     No Known Problems Paternal Grandfather     No Known Problems Maternal Aunt     Colon cancer Maternal Uncle     No Known Problems Paternal Aunt     No Known Problems Paternal Uncle     Breast cancer Cousin 32    Arthritis Family     Lung cancer Family     Uterine cancer Family     ADD / ADHD Neg Hx     Anesthesia problems Neg Hx     Clotting disorder Neg Hx     Collagen disease Neg Hx     Diabetes Neg Hx     Dislocations Neg Hx     Learning disabilities Neg Hx     Neurological problems Neg Hx     Osteoporosis Neg Hx     Rheumatologic  disease Neg Hx     Scoliosis Neg Hx     Vascular Disease Neg Hx        Allergies   Allergen Reactions    Azithromycin GI Intolerance       Current Outpatient Medications:     albuterol (PROVENTIL HFA,VENTOLIN HFA) 90 mcg/act inhaler, Inhale 1 puff every 6 (six) hours as needed for wheezing, Disp: 18 g, Rfl: 4    Biotin 10 MG TABS, Take by mouth, Disp: , Rfl:     cholecalciferol (VITAMIN D3) 1,000 units tablet, Take 1,000 Units by mouth daily 5000 unit daily , Disp: , Rfl:     diazepam (VALIUM) 5 mg tablet, , Disp: , Rfl:     folic acid (FOLVITE) 1 mg tablet, TAKE 3 TABLETS (3 MG TOTAL) BY MOUTH DAILY, Disp: 270 tablet, Rfl: 1    hydroxychloroquine (PLAQUENIL) 200 mg tablet, TAKE 1 TABLET (200 MG TOTAL) BY MOUTH TWO (TWO) TIMES A DAY WITH MEALS, Disp: 180 tablet, Rfl: 1    methotrexate 2.5 MG tablet, TAKE 8 TABS ONCE WEEKLY., Disp: 96 tablet, Rfl: 1    Omega-3 Fatty Acids (FISH OIL) 1,000 mg, Take 1,000 mg by mouth daily, Disp: , Rfl:     oxymetazoline (Vicks Sinex 12 Hour Decongest) 0.05 % nasal spray, 2 sprays by Each Nare route 2 (two) times a day, Disp: , Rfl:     promethazine-dextromethorphan (PHENERGAN-DM) 6.25-15 mg/5 mL oral syrup, Take 5 mL by mouth 4 (four) times a day as needed for cough, Disp: 118 mL, Rfl: 0    SUMAtriptan (IMITREX) 100 mg tablet, TAKE 1 TABLET (100 MG TOTAL) BY MOUTH ONCE AS NEEDED FOR MIGRAINE FOR UP TO 1 DOSE TAKE WHEN EXPERI*, Disp: 9 tablet, Rfl: 0      Objective:    There were no vitals filed for this visit.      Physical Exam  General: Well appearing, well nourished, in no distress. Oriented x 3, normal mood and affect.  Ambulating without difficulty.  Skin: Good turgor, no rash, unusual bruising or prominent lesions.  Mild erythema noted on the lower aspect of her neck.  Hair: Hair thinning noted.  Nails: Normal color, no deformities.  HEENT:  Head: Normocephalic, atraumatic.  Eyes: Conjunctiva clear, sclera non-icteric, EOM intact.  Extremities: No amputations or deformities,  cyanosis, edema.  Musculoskeletal:  Right elbow lateral epicondyle tenderness noted.  No soft tissue swelling.  Neurologic: Alert and oriented. No focal neurological deficits appreciated.   Psychiatric: Normal mood and affect.       Shannon Lee M.D.  Rheumatology

## 2024-02-22 ENCOUNTER — TELEPHONE (OUTPATIENT)
Dept: RHEUMATOLOGY | Facility: CLINIC | Age: 49
End: 2024-02-22

## 2024-02-22 ENCOUNTER — TELEPHONE (OUTPATIENT)
Age: 49
End: 2024-02-22

## 2024-02-22 ENCOUNTER — TELEMEDICINE (OUTPATIENT)
Dept: RHEUMATOLOGY | Facility: CLINIC | Age: 49
End: 2024-02-22
Payer: COMMERCIAL

## 2024-02-22 DIAGNOSIS — M15.0 PRIMARY GENERALIZED (OSTEO)ARTHRITIS: ICD-10-CM

## 2024-02-22 DIAGNOSIS — Z79.631 METHOTREXATE, LONG TERM, CURRENT USE: ICD-10-CM

## 2024-02-22 DIAGNOSIS — M32.9 SYSTEMIC LUPUS ERYTHEMATOSUS, UNSPECIFIED SLE TYPE, UNSPECIFIED ORGAN INVOLVEMENT STATUS (HCC): Primary | ICD-10-CM

## 2024-02-22 DIAGNOSIS — Z79.899 LONG-TERM USE OF PLAQUENIL: ICD-10-CM

## 2024-02-22 DIAGNOSIS — M79.7 FIBROMYALGIA: ICD-10-CM

## 2024-02-22 PROCEDURE — 99214 OFFICE O/P EST MOD 30 MIN: CPT | Performed by: INTERNAL MEDICINE

## 2024-02-22 RX ORDER — METHOTREXATE 2.5 MG/1
TABLET ORAL
Qty: 96 TABLET | Refills: 1 | Status: SHIPPED | OUTPATIENT
Start: 2024-02-22

## 2024-02-22 NOTE — TELEPHONE ENCOUNTER
Caller: Patient    Doctor/Office: Rosa    Call regarding :  appt     Call was transferred to: Rheum

## 2024-02-22 NOTE — PROGRESS NOTES
Virtual Regular Visit    Verification of patient location:    Patient is located at Home in the following state in which I hold an active license [place]      Assessment/Plan:    Problem List Items Addressed This Visit    None  Visit Diagnoses       Systemic lupus erythematosus, unspecified SLE type, unspecified organ involvement status (HCC)    -  Primary    Relevant Medications    methotrexate 2.5 MG tablet    Other Relevant Orders    CBC and differential    Comprehensive metabolic panel    C-reactive protein    Sedimentation rate, automated    C4 complement    C3 complement    Anti-DNA antibody, double-stranded    Urinalysis with microscopic    Protein / creatinine ratio, urine    Long-term use of Plaquenil        Methotrexate, long term, current use        Fibromyalgia        Primary generalized (osteo)arthritis                     Reason for visit is follow up.      Chief Complaint   Patient presents with    Virtual Regular Visit          Encounter provider Shannon Lee MD    Provider located at RHEUMATOLOGY [facility] RHEUMATOLOGY [address], [address] [address] [phone]      Recent Visits  No visits were found meeting these conditions.  Showing recent visits within past 7 days and meeting all other requirements  Today's Visits  Date Type Provider Dept   02/22/24 Telephone Shannon Lee MD  Rheumatology [facility]   02/22/24 Telephone Shannon Lee MD  Rheumatology [facility]   02/22/24 Telephone Shannon Lee MD  Rheumatology [facility]   02/22/24 Telemedicine Shannon Lee MD  Rheumatology [facility]   Showing today's visits and meeting all other requirements  Future Appointments  No visits were found meeting these conditions.  Showing future appointments within next 150 days and meeting all other requirements       The patient was identified by name and date of birth. Belén Piper was informed that this is  a telemedicine visit and that the visit is being conducted through the Epic Embedded platform. She agrees to proceed..  My office door was closed. No one else was in the room.  She acknowledged consent and understanding of privacy and security of the video platform. The patient has agreed to participate and understands they can discontinue the visit at any time.    Patient is aware this is a billable service.       Subjective    HPI     INITIAL VISIT NOTE (10/2022):  Ms. Piper a 47-year-old female with history significant for systemic lupus erythematosus and fibromyalgia who presents to [facility] with [facility].  She is currently on hydroxychloroquine 200 mg twice daily, methotrexate 20 mg once weekly with folic acid 1 mg daily and gabapentin 600 mg at bedtime.  She is transferring care from Dr. Laly Wilkerson.  She is self-referred today.       I am unable to access her initial labs but based on her prior rheumatology records she was diagnosed with systemic lupus erythematosus in 2017 when she presented with a positive GLENNA, double-stranded DNA antibody as well as diffuse arthralgias.  Based on labs that I am able to access dating back to 2020 she has presented with a normal GLENNA, double-stranded DNA antibody, inflammatory markers and complements.  She mentions at the time of her diagnosis she was started on hydroxychloroquine 200 mg twice daily which she has been on since then and follows regularly with Ophthalmology.  She did not feel like the hydroxychloroquine really impacted her symptoms and a few months after her diagnosis methotrexate was added on currently at a dose of 20 mg once weekly.  She reports that this does help to some degree.  She also has a diagnosis of fibromyalgia given the ongoing pain and eventually duloxetine was added but as she developed side effects to this it was discontinued and currently she is on gabapentin 600 mg nightly which helps with the body pain and her sleep.  She  was initially prescribed 600 mg twice daily but stopped the morning dose as it was causing excessive drowsiness.  She takes an occasional over-the-counter Advil as needed which does help her.  She reports that she still experiences pain in her hands and knees without any joint swelling.  She describes morning stiffness that takes a few minutes to improve as well as gelling phenomenon.     She reports previously she was experiencing significant hair loss but this has improved since starting biotin.  She does feel warm at times but has not checked her temperature.  Intermittently she will notice a redness appearance on her neck but denies other skin rashes.  At times she has noticed a sun sensitive skin rash arise on her legs.  She thinks that she may manifest with nose and mouth sores but has never visualized this.  She reports a history of 1 episode of colitis many years ago.  She does not experience classic Raynaud's symptoms with color changes on cold exposure but reports that with slight cold exposure she will notice icy feet.     She denies fevers, unintentional weight loss, ongoing alopecia, dry eyes, dry mouth, inflammatory eye disease, psoriasis, swollen glands, pleuritic chest pain, inflammatory bowel disease, blood clots, miscarriages or a family history of autoimmune disease.     I reviewed her most recent labs from August 2022 which showed an unremarkable CBC, CMP, ESR, CRP, C3, C4, double-stranded DNA antibody, GLENNA screen and urinalysis.      2/16/2023:  Patient presents for a follow-up of systemic lupus erythematosus and fibromyalgia.  She is currently on hydroxychloroquine 200 mg twice daily, methotrexate 20 mg once weekly with folic acid 3 mg daily and gabapentin 600 mg at bedtime.  I reviewed her testing done on 2/2/2023 and this showed a normal GLENNA specificity, ESR, CRP, C3, C4, antiphospholipid antibody testing, urine analysis, urine protein creatinine ratio, rheumatoid factor, anti-CCP antibody,  ferritin, HLA-B27 antigen, chronic hepatitis panel, CBC, CMP and thyroid antibody profile.    She is not reporting any new complaints today but is continuing to experience fatigue.  She is a single parent and cares for her 11-year-old twins and 6-year-old child who has ADHD and reports that this keeps her very busy.  She is not reporting any fevers, unintentional weight loss, skin rashes, mouth/nose ulcers, swollen glands, pleuritic chest pain or new joint pains at this time.      8/17/2023:  Patient presents for a follow-up of systemic lupus erythematosus and fibromyalgia.  She is currently on hydroxychloroquine 200 mg twice daily, methotrexate 20 mg once weekly with folic acid 3 mg daily and gabapentin 600 mg at bedtime.  I reviewed her recent labs which showed a normal CBC, CMP, ESR, CRP, C3, C4, double-stranded DNA antibody, urine analysis and urine protein creatinine ratio.    She reports over the past few months she has been seen by her primary care physician multiple times as she continues to experience a sensation like her nose and throat are swollen and irritated.  She has not seen ENT during this timeframe.  She did see an allergist and had allergy testing done which was unremarkable.  She may have received an intramuscular steroid injection in their office which significantly helped with her symptoms but reports she has continued to have recurrent complaints following this.  Most recently she was seen by her PCP and prescribed amoxicillin for 1 week with 2 days remaining.  She reports that this has helped with her symptoms.  She was also prescribed steroids but has not started this since the amoxicillin has helped.  Of note she has not held the methotrexate during her illness.    She has also been dealing with chronic low back pain and stiffness secondary to lumbar degenerative arthritis.  She was seen by spine and pain management and a spinal cord stimulator was discussed with her but she is hesitant to  proceed with this.  She is trying to avoid interventional procedures as [facility] does not use general anesthesia for these procedures and she is anxious about getting them done without anesthesia.     Other than these complaints she reports chronic pain affecting her right elbow which at times can sharply radiate down her forearm.  No additional concerning joint pains.  No swollen joints.  She experiences morning stiffness affecting her diffusely especially in her low back that starts to improve gradually with activities.  She reports chronic redness on her neck which at times can flareup.  Otherwise no fevers, unintentional weight loss, new skin rashes, mouth/nose ulcers, swollen glands or pleuritic chest pain.    She has been tolerating the methotrexate and hydroxychloroquine well and has to check when she had her last annual eye exam.      2/22/2024:  Patient presents for a follow-up of systemic lupus erythematosus and fibromyalgia.  She is currently on hydroxychloroquine 200 mg twice daily, methotrexate 20 mg once weekly with folic acid 3 mg daily and gabapentin 600 mg at bedtime.  I reviewed her recent labs which showed a normal CBC, CMP, ESR, CRP, C3, C4, double-stranded DNA antibody, urine analysis and urine protein creatinine ratio.  I reviewed the x-ray of her right elbow which was normal.    She reports since last office visit she has been well without any new symptoms such as fevers, unintentional weight loss, skin rashes, mouth/nose ulcers, swollen glands, pleuritic chest pain or a flareup and joint pain/swelling/stiffness.  She may experience periodic joint pains which are manageable.  She requested a virtual visit as she was recently diagnosed with the flu and is recovering at home.    She has been tolerating the methotrexate and hydroxychloroquine well and is up-to-date with her biannual eye exams.      Past Medical History:   Diagnosis Date    Acquired ankle/foot deformity     last assessed:  2017    Anxiety     Chronic pain disorder     right foot plantar    Dermatomycosis 2008    Dysfunctional uterine bleeding     Last assessed: 2018    Headache     Lupus (HCC)     Mass of knee     last assessed: 2014    Mild acid reflux     Paronychia of finger     last assessed: 2013    Paronychia of toe     last asssessed: 2016. right foot    Patellofemoral dysfunction     last assessed: 3/21/2014    PCOS (polycystic ovarian syndrome) 12/15/2007    Pes planus, congenital     last assessed: 2017    Plantar fasciitis of right foot     Plantar fibromatosis     last assessed: 2017    PONV (postoperative nausea and vomiting)     Trichomoniasis     last assessed: 2015    Vaginal candidiasis     last assessed: 2016    Varicose veins of lower extremity     last assessed: 3/5/2014    Vertigo     last assessed: 3/5/2014    Viral gastroenteritis     last assessed: 2015       Past Surgical History:   Procedure Laterality Date    BACK SURGERY      discectomy lumbar     SECTION      x2 bikini    PILONIDAL CYST EXCISION      LA HYSTEROSCOPY BX ENDOMETRIUM&/POLYPC W/WO D&C N/A 10/18/2017    Procedure: HYSTEROSCOPY AND FRACTIONAL DILATATION AND CURRETTAGE;  Surgeon: Rudy Zaragoza MD;  Location: [facility] MAIN OR;  Service: Gynecology    TUBAL LIGATION  2016    WISDOM TOOTH EXTRACTION         Current Outpatient Medications   Medication Sig Dispense Refill    methotrexate 2.5 MG tablet TAKE 8 TABS ONCE WEEKLY. 96 tablet 1    albuterol (PROVENTIL HFA,VENTOLIN HFA) 90 mcg/act inhaler Inhale 1 puff every 6 (six) hours as needed for wheezing 18 g 4    Biotin 10 MG TABS Take by mouth      cholecalciferol (VITAMIN D3) 1,000 units tablet Take 1,000 Units by mouth daily 5000 unit daily       diazepam (VALIUM) 5 mg tablet       folic acid (FOLVITE) 1 mg tablet TAKE 3 TABLETS (3 MG TOTAL) BY MOUTH DAILY 270 tablet 1    hydroxychloroquine (PLAQUENIL) 200 mg tablet TAKE 1 TABLET (200  MG TOTAL) BY MOUTH TWO (TWO) TIMES A DAY WITH MEALS 180 tablet 1    Omega-3 Fatty Acids (FISH OIL) 1,000 mg Take 1,000 mg by mouth daily      oxymetazoline (Vicks Sinex 12 Hour Decongest) 0.05 % nasal spray 2 sprays by Each Nare route 2 (two) times a day      promethazine-dextromethorphan (PHENERGAN-DM) 6.25-15 mg/5 mL oral syrup Take 5 mL by mouth 4 (four) times a day as needed for cough 118 mL 0    SUMAtriptan (IMITREX) 100 mg tablet TAKE 1 TABLET (100 MG TOTAL) BY MOUTH ONCE AS NEEDED FOR MIGRAINE FOR UP TO 1 DOSE TAKE WHEN EXPERI* 9 tablet 0     No current facility-administered medications for this visit.        Allergies   Allergen Reactions    Azithromycin GI Intolerance       Review of Systems  Constitutional: Negative for weight change, fevers, chills, night sweats, fatigue.  ENT/Mouth: Negative for hearing changes, ear pain, nasal congestion, sinus pain, hoarseness, sore throat, rhinorrhea, swallowing difficulty.   Eyes: Negative for pain, redness, discharge, vision changes.   Cardiovascular: Negative for chest pain, SOB, palpitations.   Respiratory: Negative for cough, sputum, wheezing, dyspnea.   Gastrointestinal: Negative for nausea, vomiting, diarrhea, constipation, pain, heartburn.  Genitourinary: Negative for dysuria, urinary frequency, hematuria.   Musculoskeletal: As per HPI.  Skin: Negative for skin rash, color changes.   Neuro: Negative for weakness, numbness, tingling, loss of consciousness.   Psych: Negative for anxiety, depression.   Heme/Lymph: Negative for easy bruising, bleeding, lymphadenopathy.        Video Exam    There were no vitals filed for this visit.    Physical Exam   General: Well appearing, well nourished, in no distress. Oriented x 3, normal mood and affect.  Skin: Good turgor, no rash, unusual bruising or prominent lesions.  HEENT:  Head: Normocephalic, atraumatic.  Eyes: Conjunctiva clear, sclera non-icteric, EOM intact.  Nose: No external lesions.  Neck: Supple.  Neurologic:  Alert and oriented. No focal neurological deficits appreciated.   Psychiatric: Normal mood and affect.       Assessment and Plan:   Ms. Piper a 48-year-old female with history significant for systemic lupus erythematosus and fibromyalgia who presents for a follow-up.  She is currently on hydroxychloroquine 200 mg twice daily, methotrexate 20 mg once weekly with folic acid 3 mg daily and gabapentin 600 mg at bedtime.       # Systemic lupus erythematosus  - Belén presents today for a follow-up of systemic lupus erythematosus and fibromyalgia that was diagnosed in 2017.  Since then she has been on hydroxychloroquine at 200 mg twice daily and methotrexate 20 mg once weekly which has overall kept her stable.  She is not reporting any symptoms today suggestive of activity related to the lupus and reassuringly her recent serologies have all been normal.  She will continue the same regimen unchanged and update lupus related lab monitoring prior to the follow-up visit.  Of note as she has been dealing with influenza infection I advised her to hold the methotrexate for the next dose to minimize immunosuppression while she is on antibiotics.  She is aware to hold this in the future as well if she is diagnosed with an infection or is on antibiotics.     # Chronic low back pain secondary to lumbar degenerative arthritis  - I encouraged her to rethink the spinal stimulator or interventional procedures and follow-up with spine and pain management for further guidance.       Visit Time  Total Visit Duration: 12 minutes.

## 2024-02-23 ENCOUNTER — OFFICE VISIT (OUTPATIENT)
Dept: FAMILY MEDICINE CLINIC | Facility: CLINIC | Age: 49
End: 2024-02-23
Payer: COMMERCIAL

## 2024-02-23 VITALS
HEART RATE: 76 BPM | OXYGEN SATURATION: 99 % | TEMPERATURE: 98.4 F | SYSTOLIC BLOOD PRESSURE: 98 MMHG | DIASTOLIC BLOOD PRESSURE: 70 MMHG | RESPIRATION RATE: 16 BRPM | BODY MASS INDEX: 25.3 KG/M2 | WEIGHT: 161.2 LBS | HEIGHT: 67 IN

## 2024-02-23 DIAGNOSIS — R50.9 LOW GRADE FEVER: ICD-10-CM

## 2024-02-23 DIAGNOSIS — J02.9 SORE THROAT: ICD-10-CM

## 2024-02-23 DIAGNOSIS — J10.1 INFLUENZA B: Primary | ICD-10-CM

## 2024-02-23 LAB
SARS-COV-2 AG UPPER RESP QL IA: NEGATIVE
SL AMB POCT RAPID FLU A: NEGATIVE
SL AMB POCT RAPID FLU B: POSITIVE
VALID CONTROL: NORMAL

## 2024-02-23 PROCEDURE — 87811 SARS-COV-2 COVID19 W/OPTIC: CPT | Performed by: FAMILY MEDICINE

## 2024-02-23 PROCEDURE — 99213 OFFICE O/P EST LOW 20 MIN: CPT | Performed by: FAMILY MEDICINE

## 2024-02-23 PROCEDURE — 87804 INFLUENZA ASSAY W/OPTIC: CPT | Performed by: FAMILY MEDICINE

## 2024-02-23 RX ORDER — OSELTAMIVIR PHOSPHATE 75 MG/1
75 CAPSULE ORAL EVERY 12 HOURS SCHEDULED
Qty: 10 CAPSULE | Refills: 0 | Status: SHIPPED | OUTPATIENT
Start: 2024-02-23 | End: 2024-02-28

## 2024-02-23 NOTE — PROGRESS NOTES
Assessment/Plan:    1. Influenza B  -     oseltamivir (TAMIFLU) 75 mg capsule; Take 1 capsule (75 mg total) by mouth every 12 (twelve) hours for 5 days    2. Sore throat  -     POCT Rapid Covid Ag  -     POCT rapid flu A and B    3. Low grade fever  -     POCT Rapid Covid Ag  -     POCT rapid flu A and B    Rest/fluids/otc analgesics/immune supplements      COVID and Flu A both negative; Flu B positive    Subjective:      Patient ID: Belén Piper is a 48 y.o. female.    Chief Complaint   Patient presents with   • Cough       start- tuesday chills body aches, low grade fever, headache, nausea, sinus pressure,          Fever  This is a new problem. The current episode started in the past 7 days. Associated symptoms include arthralgias, chills, congestion, coughing, fatigue, a fever, headaches, myalgias, nausea, a sore throat and weakness. Pertinent negatives include no rash.       The following portions of the patient's history were reviewed and updated as appropriate: allergies, current medications, past family history, past medical history, past social history, past surgical history and problem list.    Review of Systems   Constitutional:  Positive for chills, fatigue and fever.   HENT:  Positive for congestion and sore throat.    Respiratory:  Positive for cough.    Gastrointestinal:  Positive for nausea.   Musculoskeletal:  Positive for arthralgias and myalgias.   Skin:  Negative for rash.   Neurological:  Positive for weakness and headaches.         Current Outpatient Medications   Medication Sig Dispense Refill   • albuterol (PROVENTIL HFA,VENTOLIN HFA) 90 mcg/act inhaler Inhale 1 puff every 6 (six) hours as needed for wheezing 18 g 4   • Biotin 10 MG TABS Take by mouth     • cholecalciferol (VITAMIN D3) 1,000 units tablet Take 1,000 Units by mouth daily 5000 unit daily      • diazepam (VALIUM) 5 mg tablet      • folic acid (FOLVITE) 1 mg tablet TAKE 3 TABLETS (3 MG TOTAL) BY MOUTH DAILY 270 tablet 1   •  "hydroxychloroquine (PLAQUENIL) 200 mg tablet TAKE 1 TABLET (200 MG TOTAL) BY MOUTH TWO (TWO) TIMES A DAY WITH MEALS 180 tablet 1   • methotrexate 2.5 MG tablet TAKE 8 TABS ONCE WEEKLY. 96 tablet 1   • Omega-3 Fatty Acids (FISH OIL) 1,000 mg Take 1,000 mg by mouth daily     • oseltamivir (TAMIFLU) 75 mg capsule Take 1 capsule (75 mg total) by mouth every 12 (twelve) hours for 5 days 10 capsule 0   • oxymetazoline (Vicks Sinex 12 Hour Decongest) 0.05 % nasal spray 2 sprays by Each Nare route 2 (two) times a day     • SUMAtriptan (IMITREX) 100 mg tablet TAKE 1 TABLET (100 MG TOTAL) BY MOUTH ONCE AS NEEDED FOR MIGRAINE FOR UP TO 1 DOSE TAKE WHEN EXPERI* 9 tablet 0   • promethazine-dextromethorphan (PHENERGAN-DM) 6.25-15 mg/5 mL oral syrup Take 5 mL by mouth 4 (four) times a day as needed for cough (Patient not taking: Reported on 2/23/2024) 118 mL 0     No current facility-administered medications for this visit.       Objective:    BP 98/70 (BP Location: Left arm, Patient Position: Sitting, Cuff Size: Large)   Pulse 76   Temp 98.4 °F (36.9 °C)   Resp 16   Ht 5' 7\" (1.702 m)   Wt 73.1 kg (161 lb 3.2 oz)   LMP 02/01/2024   SpO2 99%   BMI 25.25 kg/m²        Physical Exam  Vitals and nursing note reviewed.   Constitutional:       General: She is not in acute distress.     Appearance: She is ill-appearing.   HENT:      Ears:      Comments: TMs dull     Nose: Congestion and rhinorrhea present.      Mouth/Throat:      Pharynx: Posterior oropharyngeal erythema present. No oropharyngeal exudate.   Eyes:      General:         Right eye: No discharge.         Left eye: No discharge.   Cardiovascular:      Rate and Rhythm: Normal rate and regular rhythm.   Pulmonary:      Effort: No respiratory distress.      Breath sounds: Rhonchi present.      Comments: No localization  Abdominal:      General: Bowel sounds are normal.      Palpations: Abdomen is soft.      Tenderness: There is no abdominal tenderness.   Musculoskeletal: "         General: Tenderness present. Normal range of motion.      Cervical back: Neck supple. No tenderness.   Skin:     General: Skin is warm and dry.      Coloration: Skin is not jaundiced.      Findings: No rash.   Neurological:      General: No focal deficit present.      Mental Status: She is alert and oriented to person, place, and time.      Cranial Nerves: No cranial nerve deficit.         Darcie Baez MD

## 2024-02-28 ENCOUNTER — TELEPHONE (OUTPATIENT)
Dept: FAMILY MEDICINE CLINIC | Facility: CLINIC | Age: 49
End: 2024-02-28

## 2024-02-28 DIAGNOSIS — R05.1 ACUTE COUGH: ICD-10-CM

## 2024-02-28 RX ORDER — DEXTROMETHORPHAN HYDROBROMIDE AND PROMETHAZINE HYDROCHLORIDE 15; 6.25 MG/5ML; MG/5ML
5 SYRUP ORAL 4 TIMES DAILY PRN
Qty: 118 ML | Refills: 0 | Status: SHIPPED | OUTPATIENT
Start: 2024-02-28

## 2024-02-28 NOTE — TELEPHONE ENCOUNTER
Tried calling sisters phone had to leave a message . Dr Baez said she doesn't give steroid injections for this , but if she has a cough we can call in a med , but I would like to speak with patient when she calls back

## 2024-02-28 NOTE — TELEPHONE ENCOUNTER
Patient seen by you on Friday. She is still congested, difficulty breathing and coughing. Is requesting med sent for cough. Also says in past she was given steroid injection to help with symptoms. Please advise.    Patient's phone is not working right now- please reach her on her sister Chelle's phone .

## 2024-03-18 ENCOUNTER — OFFICE VISIT (OUTPATIENT)
Dept: FAMILY MEDICINE CLINIC | Facility: CLINIC | Age: 49
End: 2024-03-18
Payer: COMMERCIAL

## 2024-03-18 VITALS
TEMPERATURE: 98.2 F | BODY MASS INDEX: 24.64 KG/M2 | SYSTOLIC BLOOD PRESSURE: 90 MMHG | WEIGHT: 157 LBS | HEIGHT: 67 IN | HEART RATE: 72 BPM | RESPIRATION RATE: 14 BRPM | DIASTOLIC BLOOD PRESSURE: 60 MMHG

## 2024-03-18 DIAGNOSIS — G43.009 MIGRAINE WITHOUT AURA AND WITHOUT STATUS MIGRAINOSUS, NOT INTRACTABLE: ICD-10-CM

## 2024-03-18 DIAGNOSIS — R35.0 FREQUENT URINATION: Primary | ICD-10-CM

## 2024-03-18 LAB
SL AMB  POCT GLUCOSE, UA: ABNORMAL
SL AMB LEUKOCYTE ESTERASE,UA: 500
SL AMB POCT BILIRUBIN,UA: ABNORMAL
SL AMB POCT BLOOD,UA: 250
SL AMB POCT CLARITY,UA: CLEAR
SL AMB POCT COLOR,UA: YELLOW
SL AMB POCT KETONES,UA: ABNORMAL
SL AMB POCT NITRITE,UA: ABNORMAL
SL AMB POCT PH,UA: 8
SL AMB POCT SPECIFIC GRAVITY,UA: 1
SL AMB POCT URINE PROTEIN: ABNORMAL
SL AMB POCT UROBILINOGEN: ABNORMAL

## 2024-03-18 PROCEDURE — 99213 OFFICE O/P EST LOW 20 MIN: CPT | Performed by: FAMILY MEDICINE

## 2024-03-18 PROCEDURE — 81003 URINALYSIS AUTO W/O SCOPE: CPT | Performed by: FAMILY MEDICINE

## 2024-03-18 RX ORDER — SUMATRIPTAN 100 MG/1
100 TABLET, FILM COATED ORAL ONCE AS NEEDED
Qty: 9 TABLET | Refills: 0 | Status: SHIPPED | OUTPATIENT
Start: 2024-03-18

## 2024-03-18 RX ORDER — SUMATRIPTAN 100 MG/1
TABLET, FILM COATED ORAL
Qty: 9 TABLET | Refills: 0 | Status: CANCELLED | OUTPATIENT
Start: 2024-03-18

## 2024-03-18 RX ORDER — CIPROFLOXACIN 500 MG/1
500 TABLET, FILM COATED ORAL EVERY 12 HOURS SCHEDULED
Qty: 10 TABLET | Refills: 0 | Status: SHIPPED | OUTPATIENT
Start: 2024-03-18 | End: 2024-03-18 | Stop reason: SDUPTHER

## 2024-03-18 RX ORDER — FLUCONAZOLE 150 MG/1
TABLET ORAL
Qty: 2 TABLET | Refills: 0 | Status: SHIPPED | OUTPATIENT
Start: 2024-03-18 | End: 2024-03-22

## 2024-03-18 RX ORDER — CIPROFLOXACIN 500 MG/1
500 TABLET, FILM COATED ORAL EVERY 12 HOURS SCHEDULED
Qty: 14 TABLET | Refills: 0 | Status: SHIPPED | OUTPATIENT
Start: 2024-03-18 | End: 2024-03-25

## 2024-03-18 NOTE — PROGRESS NOTES
Belén Piper 1975 female MRN: [id number]    FAMILY PRACTICE OFFICE VISIT  [facility]        Chief Complaint   Patient presents with   • Urinary Tract Infection     Frequent urination OTC Azo       SUBJECTIVE:     Patient complains of urinary frequency, urgency and dysuria x 1 days, without flank pain, fever, chills, or abnormal vaginal discharge or bleeding. Denies any other concerns.  Patient states that she usually does get a yeast infection and would like Diflucan if possible.  Patient would like a refill on her migraine medication if possible      OBJECTIVE:   Vitals:    03/18/24 1208   BP: 90/60   Pulse: 72   Resp: 14   Temp: 98.2 °F (36.8 °C)       Review of Systems   Constitutional:  Negative for activity change, appetite change, chills, fatigue and fever.   HENT:  Negative for congestion.    Respiratory:  Negative for cough, chest tightness and shortness of breath.    Cardiovascular:  Negative for chest pain and leg swelling.   Gastrointestinal:  Negative for abdominal distention, abdominal pain, constipation, diarrhea, nausea and vomiting.   Genitourinary:  Positive for difficulty urinating and dysuria.   All other systems reviewed and are negative.        Urine dipstick shows  Recent Results (from the past 24 hour(s))   POCT urine dip auto non-scope    Collection Time: 03/18/24 12:12 PM   Result Value Ref Range     COLOR,UA yellow     CLARITY,UA clear     SPECIFIC GRAVITY,UA 1.005      PH,UA 8     LEUKOCYTE ESTERASE,     NITRITE,UA pos     GLUCOSE, UA norm     KETONES,UA neg     BILIRUBIN,UA neg     BLOOD,     POCT URINE PROTEIN neg     SL AMB POCT UROBILINOGEN norm        Physical Exam   Constitutional: She appears healthy.   Eyes: Pupils are equal, round, and reactive to light.   Cardiovascular: Normal rate, regular rhythm, S1 normal, S2 normal and normal heart sounds. Exam reveals no gallop.   No murmur heard.  Pulmonary/Chest: Effort  normal and breath sounds normal.   Abdominal: Soft. Bowel sounds are normal. She exhibits no distension and no mass. There is abdominal tenderness.   Tenderness over the left flank minimal   Musculoskeletal:         General: Normal range of motion.      Cervical back: Normal range of motion.   Neurological: She is alert and oriented to person, place, and time.   Skin: Skin is warm.        ASSESSMENT/PLAN  Belén Piper is a 48 y.o. female presents to the office for    1) UTI uncomplicated without evidence of pyelonephritis    PLAN:   Started on Cipro 500mg BID x 5day  Encourage to  push fluids.  Diflucan recommend taking as prescribed  Urine culture sent for eval    Call or return to office if these symptoms worsen or fail to improve as anticipated.

## 2024-03-25 LAB
BACTERIA UR CULT: ABNORMAL
Lab: ABNORMAL
SL AMB ANTIMICROBIAL SUSCEPTIBILITY: ABNORMAL

## 2024-04-04 ENCOUNTER — OFFICE VISIT (OUTPATIENT)
Dept: FAMILY MEDICINE CLINIC | Facility: CLINIC | Age: 49
End: 2024-04-04
Payer: COMMERCIAL

## 2024-04-04 VITALS
TEMPERATURE: 98.5 F | DIASTOLIC BLOOD PRESSURE: 60 MMHG | BODY MASS INDEX: 25.43 KG/M2 | RESPIRATION RATE: 14 BRPM | HEART RATE: 78 BPM | WEIGHT: 162 LBS | OXYGEN SATURATION: 98 % | SYSTOLIC BLOOD PRESSURE: 110 MMHG | HEIGHT: 67 IN

## 2024-04-04 DIAGNOSIS — K41.90 NON-RECURRENT UNILATERAL FEMORAL HERNIA WITHOUT OBSTRUCTION OR GANGRENE: Primary | ICD-10-CM

## 2024-04-04 PROCEDURE — 99214 OFFICE O/P EST MOD 30 MIN: CPT | Performed by: FAMILY MEDICINE

## 2024-04-04 NOTE — PROGRESS NOTES
"Chief Complaint   Patient presents with   • Mass     Lump under skin left groin area        Patient ID: Belén Piper is a 48 y.o. female.    HPI  Pt is seeing for new painful lump in L groin noticed yesterday-  more in standing position  -  no other symptoms, normal BM  -  no prior h/o hernia     The following portions of the patient's history were reviewed and updated as appropriate: allergies, current medications, past family history, past medical history, past social history, past surgical history and problem list.    Review of Systems   All other systems reviewed and are negative.      Current Outpatient Medications   Medication Sig Dispense Refill   • albuterol (PROVENTIL HFA,VENTOLIN HFA) 90 mcg/act inhaler Inhale 1 puff every 6 (six) hours as needed for wheezing 18 g 4   • Biotin 10 MG TABS Take by mouth     • cholecalciferol (VITAMIN D3) 1,000 units tablet Take 1,000 Units by mouth daily 5000 unit daily      • cyanocobalamin (VITAMIN B-12) 50 MCG tablet Take 50 mcg by mouth daily     • folic acid (FOLVITE) 1 mg tablet TAKE 3 TABLETS (3 MG TOTAL) BY MOUTH DAILY 270 tablet 1   • hydroxychloroquine (PLAQUENIL) 200 mg tablet TAKE 1 TABLET (200 MG TOTAL) BY MOUTH TWO (TWO) TIMES A DAY WITH MEALS 180 tablet 1   • methotrexate 2.5 MG tablet TAKE 8 TABS ONCE WEEKLY. 96 tablet 1   • Omega-3 Fatty Acids (FISH OIL) 1,000 mg Take 1,000 mg by mouth daily     • oxymetazoline (Vicks Sinex 12 Hour Decongest) 0.05 % nasal spray 2 sprays by Each Nare route 2 (two) times a day     • SUMAtriptan (IMITREX) 100 mg tablet Take 1 tablet (100 mg total) by mouth once as needed for migraine for up to 1 dose 9 tablet 0   • diazepam (VALIUM) 5 mg tablet  (Patient not taking: Reported on 4/4/2024)       No current facility-administered medications for this visit.       Objective:    /60 (BP Location: Left arm, Patient Position: Sitting, Cuff Size: Adult)   Pulse 78   Temp 98.5 °F (36.9 °C) (Temporal)   Resp 14   Ht 5' 7\" " (1.702 m)   Wt 73.5 kg (162 lb)   SpO2 98%   BMI 25.37 kg/m²        Physical Exam  Constitutional:       General: She is not in acute distress.     Appearance: She is not ill-appearing.   Cardiovascular:      Rate and Rhythm: Normal rate.   Pulmonary:      Effort: Pulmonary effort is normal. No respiratory distress.   Abdominal:      Palpations: Abdomen is soft. There is mass.      Tenderness: There is no abdominal tenderness. There is no guarding or rebound.       Neurological:      Mental Status: She is alert.                 Assessment/Plan:         Diagnoses and all orders for this visit:    Non-recurrent unilateral femoral hernia without obstruction or gangrene  -     US groin/inguinal area; Future  -     Ambulatory Referral to General Surgery; Future    Other orders  -     cyanocobalamin (VITAMIN B-12) 50 MCG tablet; Take 50 mcg by mouth daily      Was advised to go to ER if worsening pain -  no heavy lifting           Rto kandy Ahumada MD

## 2024-04-08 ENCOUNTER — HOSPITAL ENCOUNTER (OUTPATIENT)
Dept: RADIOLOGY | Facility: HOSPITAL | Age: 49
Discharge: HOME | End: 2024-04-08
Attending: FAMILY MEDICINE
Payer: COMMERCIAL

## 2024-04-08 DIAGNOSIS — K41.90 NON-RECURRENT UNILATERAL FEMORAL HERNIA WITHOUT OBSTRUCTION OR GANGRENE: ICD-10-CM

## 2024-04-08 PROCEDURE — 76705 ECHO EXAM OF ABDOMEN: CPT

## 2024-04-23 PROBLEM — J10.1 INFLUENZA B: Status: RESOLVED | Noted: 2024-02-23 | Resolved: 2024-04-23

## 2024-05-02 ENCOUNTER — CONSULT (OUTPATIENT)
Dept: SURGERY | Facility: CLINIC | Age: 49
End: 2024-05-02
Payer: COMMERCIAL

## 2024-05-02 VITALS
DIASTOLIC BLOOD PRESSURE: 64 MMHG | HEIGHT: 67 IN | BODY MASS INDEX: 25.27 KG/M2 | TEMPERATURE: 97.1 F | OXYGEN SATURATION: 99 % | WEIGHT: 161 LBS | SYSTOLIC BLOOD PRESSURE: 105 MMHG | HEART RATE: 59 BPM

## 2024-05-02 DIAGNOSIS — K41.90 NON-RECURRENT UNILATERAL FEMORAL HERNIA WITHOUT OBSTRUCTION OR GANGRENE: ICD-10-CM

## 2024-05-02 PROCEDURE — 99243 OFF/OP CNSLTJ NEW/EST LOW 30: CPT | Performed by: SURGERY

## 2024-05-02 NOTE — PROGRESS NOTES
"[facility] History and Physical Note:    Assessment:  Reactive left inguinal adenopathy.  The same issue seems to have been present in June 2022    Pertinent images and available reads personally reviewed  Reviewed ultrasound images and report from last month as well as 2022  Pertinent notes reviewed  I reviewed the PCP note  Notes from 2022 for previous episode    Plan:  Reactive lymph node.  Not concerning by ultrasound or exam  Patient reassured  If she desires, ultrasound-guided IR biopsy may be performed at the discretion of her PCP    Chief Complaint:  \"I am here for the lump in the left groin\"    HPI  Patient is a 48-year-old woman with a diagnosis of lupus who is referred to my office for further evaluation and management of a left groin lymph node.  This area was evaluated in June 2022 with an ultrasound as noted below.  IR biopsy was ordered but the lymph node greatly reduced in size and the biopsy was not felt necessary.    Ultrasound was performed on 8 April 2024:  FINDINGS:  2.0 x 1.5 x 2.0 cm hypervascular lymph node in the left groin area of palpable concern, presumably corresponding to a 2.6 x 1.8 x 2.5 cm hypervascular lymph node on the prior study.  Another smaller lymph node is noted measuring 1.9 x 0.7 x 0.8 cm.  Survey image of the right groin is unremarkable.  IMPRESSION:  2 cm left groin hypervascular lymph node corresponding to palpable lump. This is likely either chronic or recurrent. If there is no discernible inciting clinical etiology, needle sampling may be considered. Otherwise follow-up may be based on clinical   grounds.    Ultrasound of the same area was performed in June 2022:  IMPRESSION:  No focal fluid collection or soft tissue mass identified in the left groin.  2.6 x 1.8 x 2.5 cm hypervascular lymph node with thickened cortex in the left groin correlates with the area of palpable concern as shown by the patient, possibly reactive.  Smaller more " typically benign-appearing lymph node is also noted in the left   groin.  Clinical follow-up is advised.  Follow-up imaging can also be performed as clinically warranted.      PMH:  Past Medical History:   Diagnosis Date    Acquired ankle/foot deformity     last assessed: 2017    Anxiety     Chronic pain disorder     right foot plantar    Dermatomycosis 2008    Dysfunctional uterine bleeding     Last assessed: 2018    Headache     Lupus (HCC)     Mass of knee     last assessed: 2014    Mild acid reflux     Paronychia of finger     last assessed: 2013    Paronychia of toe     last asssessed: 2016. right foot    Patellofemoral dysfunction     last assessed: 3/21/2014    PCOS (polycystic ovarian syndrome) 12/15/2007    Pes planus, congenital     last assessed: 2017    Plantar fasciitis of right foot     Plantar fibromatosis     last assessed: 2017    PONV (postoperative nausea and vomiting)     Trichomoniasis     last assessed: 2015    Vaginal candidiasis     last assessed: 2016    Varicose veins of lower extremity     last assessed: 3/5/2014    Vertigo     last assessed: 3/5/2014    Viral gastroenteritis     last assessed: 2015       PSH:  Past Surgical History:   Procedure Laterality Date    BACK SURGERY      discectomy lumbar     SECTION      x2 bikini    PILONIDAL CYST EXCISION      MO HYSTEROSCOPY BX ENDOMETRIUM&/POLYPC W/WO D&C N/A 10/18/2017    Procedure: HYSTEROSCOPY AND FRACTIONAL DILATATION AND CURRETTAGE;  Surgeon: Rudy Zaragoza MD;  Location: [facility] MAIN OR;  Service: Gynecology    TUBAL LIGATION  2016    WISDOM TOOTH EXTRACTION         Home Meds:  Current Outpatient Medications on File Prior to Visit   Medication Sig Dispense Refill    albuterol (PROVENTIL HFA,VENTOLIN HFA) 90 mcg/act inhaler Inhale 1 puff every 6 (six) hours as needed for wheezing 18 g 4    Biotin 10 MG TABS Take by mouth      cholecalciferol (VITAMIN D3) 1,000 units tablet  Take 1,000 Units by mouth daily 5000 unit daily       cyanocobalamin (VITAMIN B-12) 50 MCG tablet Take 50 mcg by mouth daily      diazepam (VALIUM) 5 mg tablet  (Patient not taking: Reported on 2024)      folic acid (FOLVITE) 1 mg tablet TAKE 3 TABLETS (3 MG TOTAL) BY MOUTH DAILY 270 tablet 1    hydroxychloroquine (PLAQUENIL) 200 mg tablet TAKE 1 TABLET (200 MG TOTAL) BY MOUTH TWO (TWO) TIMES A DAY WITH MEALS 180 tablet 1    methotrexate 2.5 MG tablet TAKE 8 TABS ONCE WEEKLY. 96 tablet 1    Omega-3 Fatty Acids (FISH OIL) 1,000 mg Take 1,000 mg by mouth daily      oxymetazoline (Vicks Sinex 12 Hour Decongest) 0.05 % nasal spray 2 sprays by Each Nare route 2 (two) times a day      SUMAtriptan (IMITREX) 100 mg tablet Take 1 tablet (100 mg total) by mouth once as needed for migraine for up to 1 dose 9 tablet 0     No current facility-administered medications on file prior to visit.       Allergies:  Allergies   Allergen Reactions    Azithromycin GI Intolerance       Social Hx:  Social History     Socioeconomic History    Marital status: Single     Spouse name: Not on file    Number of children: Not on file    Years of education: Not on file    Highest education level: Not on file   Occupational History    Not on file   Tobacco Use    Smoking status: Former     Current packs/day: 0.00     Average packs/day: 0.5 packs/day for 25.3 years (12.6 ttl pk-yrs)     Types: Cigarettes     Start date: 1984     Quit date: 2010     Years since quittin.3    Smokeless tobacco: Never   Vaping Use    Vaping status: Never Used   Substance and Sexual Activity    Alcohol use: Yes     Comment: occ    Drug use: No    Sexual activity: Yes     Partners: Male   Other Topics Concern    Not on file   Social History Narrative    Daily cola consumption    Daily Tea Consumption     Social Determinants of Health     Financial Resource Strain: Low Risk  (3/27/2024)    Received from [facility]    Overall Financial Resource Strain  (CARDIA)     Difficulty of Paying Living Expenses: Not hard at all   Food Insecurity: Unknown (3/27/2024)    Received from [facility]    Hunger Vital Sign     Worried About Running Out of Food in the Last Year: Never true     Ran Out of Food in the Last Year: Not on file   Transportation Needs: Unknown (3/27/2024)    Received from [facility]    PRAPARE - Transportation     Lack of Transportation (Medical): No     Lack of Transportation (Non-Medical): Not on file   Physical Activity: Not on file   Stress: Not on file   Social Connections: Unknown (3/27/2024)    Received from [facility]    Social Connection and Isolation Panel [NHANES]     Frequency of Communication with Friends and Family: Three times a week     Frequency of Social Gatherings with Friends and Family: Not on file     Attends Jehovah's witness Services: Not on file     Active Member of Clubs or Organizations: Not on file     Attends Club or Organization Meetings: Not on file     Marital Status: Not on file   Intimate Partner Violence: Unknown (3/27/2024)    Received from [facility]    Humiliation, Afraid, Rape, and Kick questionnaire     Fear of Current or Ex-Partner: No     Emotionally Abused: Not on file     Physically Abused: Not on file     Sexually Abused: Not on file   Housing Stability: Unknown (3/27/2024)    Received from [facility]    Housing Stability Vital Sign     Unable to Pay for Housing in the Last Year: No     Number of Places Lived in the Last Year: Not on file     Unstable Housing in the Last Year: Not on file        Family Hx:    Family History   Problem Relation Age of Onset    Cancer Mother         skin , gallbladder    Liver disease Mother     Hypertension Father     Heart disease Sister     Cancer Brother         skin    No Known Problems Son     No Known Problems Son     No Known Problems Daughter     No Known Problems Maternal Grandmother     No Known Problems Maternal Grandfather     No Known Problems Paternal  Grandmother     No Known Problems Paternal Grandfather     No Known Problems Maternal Aunt     Colon cancer Maternal Uncle     No Known Problems Paternal Aunt     No Known Problems Paternal Uncle     Breast cancer Cousin 32    Arthritis Family     Lung cancer Family     Uterine cancer Family     ADD / ADHD Neg Hx     Anesthesia problems Neg Hx     Clotting disorder Neg Hx     Collagen disease Neg Hx     Diabetes Neg Hx     Dislocations Neg Hx     Learning disabilities Neg Hx     Neurological problems Neg Hx     Osteoporosis Neg Hx     Rheumatologic disease Neg Hx     Scoliosis Neg Hx     Vascular Disease Neg Hx          Review of Systems   Constitutional:  Negative for chills and fever.   HENT: Negative.     Respiratory: Negative.     Cardiovascular: Negative.    Genitourinary: Negative.    Musculoskeletal: Negative.    Allergic/Immunologic:        Lupus   Neurological: Negative.    Hematological: Negative.    All other systems reviewed and are negative.    There were no vitals taken for this visit.    Physical Exam  Vitals reviewed.   Constitutional:       General: She is not in acute distress.     Appearance: Normal appearance.   HENT:      Head: Normocephalic and atraumatic.   Cardiovascular:      Rate and Rhythm: Normal rate.      Pulses: Normal pulses.   Pulmonary:      Effort: Pulmonary effort is normal. No respiratory distress.      Breath sounds: Normal breath sounds.   Abdominal:      General: Abdomen is flat. There is no distension.      Comments: Well-circumscribed, mobile lymph node left groin consistent with ultrasound.  Not concerning by physical exam   Musculoskeletal:         General: Normal range of motion.   Skin:     General: Skin is warm and dry.   Neurological:      Mental Status: She is alert.       Pertinent labs reviewed    Pertinent images and available reads personally reviewed  Reviewed ultrasound images and report from last month as well as 2022  Pertinent notes reviewed  I reviewed the  PCP note  Notes from 2022 for previous episode     Kang Oliveira MD FACS  [facility]  [phone]

## 2024-05-17 DIAGNOSIS — J32.9 SINUSITIS, UNSPECIFIED CHRONICITY, UNSPECIFIED LOCATION: ICD-10-CM

## 2024-05-18 RX ORDER — ALBUTEROL SULFATE 90 UG/1
1 AEROSOL, METERED RESPIRATORY (INHALATION) EVERY 6 HOURS PRN
Qty: 6.7 G | Refills: 5 | Status: SHIPPED | OUTPATIENT
Start: 2024-05-18

## 2024-07-08 ENCOUNTER — TELEPHONE (OUTPATIENT)
Dept: RHEUMATOLOGY | Facility: CLINIC | Age: 49
End: 2024-07-08

## 2024-07-09 DIAGNOSIS — G43.009 MIGRAINE WITHOUT AURA AND WITHOUT STATUS MIGRAINOSUS, NOT INTRACTABLE: ICD-10-CM

## 2024-07-09 RX ORDER — SUMATRIPTAN 100 MG/1
100 TABLET, FILM COATED ORAL ONCE AS NEEDED
Qty: 9 TABLET | Refills: 0 | Status: SHIPPED | OUTPATIENT
Start: 2024-07-09

## 2024-07-19 ENCOUNTER — TELEPHONE (OUTPATIENT)
Age: 49
End: 2024-07-19

## 2024-07-19 NOTE — TELEPHONE ENCOUNTER
Patient needs tb test for work some time next week, access cannot schedule without order- please order and schedule

## 2024-07-23 ENCOUNTER — CLINICAL SUPPORT (OUTPATIENT)
Dept: FAMILY MEDICINE CLINIC | Facility: CLINIC | Age: 49
End: 2024-07-23
Payer: COMMERCIAL

## 2024-07-23 DIAGNOSIS — Z11.1 VISIT FOR TB SKIN TEST: Primary | ICD-10-CM

## 2024-07-23 PROCEDURE — 86580 TB INTRADERMAL TEST: CPT

## 2024-07-25 ENCOUNTER — CLINICAL SUPPORT (OUTPATIENT)
Dept: FAMILY MEDICINE CLINIC | Facility: CLINIC | Age: 49
End: 2024-07-25
Payer: COMMERCIAL

## 2024-07-25 DIAGNOSIS — Z11.1 ENCOUNTER FOR PPD SKIN TEST READING: Primary | ICD-10-CM

## 2024-07-25 LAB
INDURATION: 0 MM
TB SKIN TEST: NEGATIVE

## 2024-07-25 PROCEDURE — 99211 OFF/OP EST MAY X REQ PHY/QHP: CPT | Performed by: FAMILY MEDICINE

## 2024-08-22 LAB
ALBUMIN SERPL-MCNC: 4.4 G/DL (ref 3.9–4.9)
ALP SERPL-CCNC: 50 IU/L (ref 44–121)
ALT SERPL-CCNC: 7 IU/L (ref 0–32)
APPEARANCE UR: CLEAR
AST SERPL-CCNC: 18 IU/L (ref 0–40)
BACTERIA URNS QL MICRO: NORMAL
BASOPHILS # BLD AUTO: 0.1 X10E3/UL (ref 0–0.2)
BASOPHILS NFR BLD AUTO: 1 %
BILIRUB SERPL-MCNC: 0.4 MG/DL (ref 0–1.2)
BILIRUB UR QL STRIP: NEGATIVE
BUN SERPL-MCNC: 13 MG/DL (ref 6–24)
BUN/CREAT SERPL: 15 (ref 9–23)
C3 SERPL-MCNC: 111 MG/DL (ref 82–167)
C4 SERPL-MCNC: 17 MG/DL (ref 12–38)
CALCIUM SERPL-MCNC: 9.6 MG/DL (ref 8.7–10.2)
CASTS URNS QL MICRO: NORMAL /LPF
CHLORIDE SERPL-SCNC: 101 MMOL/L (ref 96–106)
CO2 SERPL-SCNC: 25 MMOL/L (ref 20–29)
COLOR UR: YELLOW
CREAT SERPL-MCNC: 0.88 MG/DL (ref 0.57–1)
CREAT UR-MCNC: 87.8 MG/DL
CRP SERPL-MCNC: 1 MG/L (ref 0–10)
DSDNA AB SER-ACNC: <1 IU/ML (ref 0–9)
EGFR: 81 ML/MIN/1.73
EOSINOPHIL # BLD AUTO: 0.1 X10E3/UL (ref 0–0.4)
EOSINOPHIL NFR BLD AUTO: 2 %
EPI CELLS #/AREA URNS HPF: NORMAL /HPF (ref 0–10)
ERYTHROCYTE [DISTWIDTH] IN BLOOD BY AUTOMATED COUNT: 13.2 % (ref 11.7–15.4)
ERYTHROCYTE [SEDIMENTATION RATE] IN BLOOD BY WESTERGREN METHOD: 2 MM/HR (ref 0–32)
GLOBULIN SER-MCNC: 2.6 G/DL (ref 1.5–4.5)
GLUCOSE SERPL-MCNC: 86 MG/DL (ref 70–99)
GLUCOSE UR QL: NEGATIVE
HCT VFR BLD AUTO: 38.4 % (ref 34–46.6)
HGB BLD-MCNC: 12.7 G/DL (ref 11.1–15.9)
HGB UR QL STRIP: NEGATIVE
IMM GRANULOCYTES # BLD: 0 X10E3/UL (ref 0–0.1)
IMM GRANULOCYTES NFR BLD: 1 %
KETONES UR QL STRIP: NEGATIVE
LEUKOCYTE ESTERASE UR QL STRIP: NEGATIVE
LYMPHOCYTES # BLD AUTO: 2.1 X10E3/UL (ref 0.7–3.1)
LYMPHOCYTES NFR BLD AUTO: 31 %
MCH RBC QN AUTO: 30.7 PG (ref 26.6–33)
MCHC RBC AUTO-ENTMCNC: 33.1 G/DL (ref 31.5–35.7)
MCV RBC AUTO: 93 FL (ref 79–97)
MICRO URNS: NORMAL
MICRO URNS: NORMAL
MONOCYTES # BLD AUTO: 0.5 X10E3/UL (ref 0.1–0.9)
MONOCYTES NFR BLD AUTO: 8 %
NEUTROPHILS # BLD AUTO: 3.8 X10E3/UL (ref 1.4–7)
NEUTROPHILS NFR BLD AUTO: 57 %
NITRITE UR QL STRIP: NEGATIVE
PH UR STRIP: 5.5 [PH] (ref 5–7.5)
PLATELET # BLD AUTO: 262 X10E3/UL (ref 150–450)
POTASSIUM SERPL-SCNC: 4.8 MMOL/L (ref 3.5–5.2)
PROT SERPL-MCNC: 7 G/DL (ref 6–8.5)
PROT UR QL STRIP: NEGATIVE
PROT UR-MCNC: 7.5 MG/DL
PROT/CREAT UR: 85 MG/G CREAT (ref 0–200)
RBC # BLD AUTO: 4.14 X10E6/UL (ref 3.77–5.28)
RBC #/AREA URNS HPF: NORMAL /HPF (ref 0–2)
SODIUM SERPL-SCNC: 140 MMOL/L (ref 134–144)
SP GR UR: 1.01 (ref 1–1.03)
UROBILINOGEN UR STRIP-ACNC: 0.2 MG/DL (ref 0.2–1)
WBC # BLD AUTO: 6.6 X10E3/UL (ref 3.4–10.8)
WBC #/AREA URNS HPF: NORMAL /HPF (ref 0–5)

## 2024-08-25 NOTE — PROGRESS NOTES
Assessment and Plan:   Ms. Piper a 49-year-old female with history significant for systemic lupus erythematosus and fibromyalgia who presents for a follow-up.  She is currently on hydroxychloroquine 200 mg twice daily and methotrexate 20 mg once weekly with folic acid 3 mg daily.       # Systemic lupus erythematosus  - Belén presents today for a follow-up of systemic lupus erythematosus and fibromyalgia that was diagnosed in 2017.  Since then she has been on hydroxychloroquine at 200 mg twice daily and methotrexate 20 mg once weekly which has overall kept her stable.  She is not reporting any symptoms today suggestive of activity related to the lupus and reassuringly her recent serologies have all been normal.  She will continue the same regimen unchanged and update lupus related lab monitoring and high risk medication lab monitoring to assess for drug toxicities prior to the follow-up visit.  She is up-to-date with her biannual eye exams, with her last exam done in 5/24 being normal.    # Worsening fatigue  - She is experiencing worsening fatigue over the past week.  I am unable to attribute this to the lupus given an otherwise unremarkable review of systems and normal recent serologies.  I requested she follow-up with her primary care physician to check hormone/vitamin levels.  Given a history of snoring and mouth breathing I would also like her to see sleep medicine for consideration of a sleep study to rule out obstructive sleep apnea.  In the meanwhile I requested she focus on lifestyle measures as well such as starting an exercise regimen and maintaining an anti-inflammatory diet.     # Chronic low back pain secondary to lumbar degenerative arthritis  - I encouraged her to rethink the spinal stimulator or interventional procedures and follow-up with spine and pain management for further guidance.      Plan:  Diagnoses and all orders for this visit:    Systemic lupus erythematosus, unspecified SLE type,  unspecified organ involvement status (HCC)  -     CBC and differential; Future  -     Comprehensive metabolic panel; Future  -     C-reactive protein; Future  -     Sedimentation rate, automated; Future  -     C4 complement; Future  -     C3 complement; Future  -     Anti-DNA antibody, double-stranded; Future  -     Urinalysis with microscopic; Future  -     Protein / creatinine ratio, urine; Future  -     hydroxychloroquine (PLAQUENIL) 200 mg tablet; Take 1 tablet (200 mg total) by mouth 2 (two) times a day with meals  -     methotrexate 2.5 MG tablet; TAKE 8 TABS ONCE WEEKLY.  -     folic acid (FOLVITE) 1 mg tablet; Take 3 tablets (3 mg total) by mouth daily  -     CBC and differential  -     Comprehensive metabolic panel  -     C-reactive protein  -     Sedimentation rate, automated  -     C4 complement  -     C3 complement  -     Anti-DNA antibody, double-stranded  -     Urinalysis with microscopic  -     Protein / creatinine ratio, urine    Long-term use of Plaquenil    Methotrexate, long term, current use  -     folic acid (FOLVITE) 1 mg tablet; Take 3 tablets (3 mg total) by mouth daily    Chronic fatigue  -     Ambulatory Referral to Sleep Medicine; Future    Fibromyalgia    Primary generalized (osteo)arthritis      Activities as tolerated.   Exercise: try to maintain a low impact exercise regimen as much as possible. Walk for 30 minutes a day for at least 3 days a week.   Continue other medications as prescribed by PCP and other specialists.       RTC in 6 months.        HPI    INITIAL VISIT NOTE (10/2022):  Ms. Piper a 47-year-old female with history significant for systemic lupus erythematosus and fibromyalgia who presents to [facility] with [facility].  She is currently on hydroxychloroquine 200 mg twice daily, methotrexate 20 mg once weekly with folic acid 1 mg daily and gabapentin 600 mg at bedtime.  She is transferring care from Dr. Laly Wilkerson.  She is self-referred today.       I am  unable to access her initial labs but based on her prior rheumatology records she was diagnosed with systemic lupus erythematosus in 2017 when she presented with a positive GLENNA, double-stranded DNA antibody as well as diffuse arthralgias.  Based on labs that I am able to access dating back to 2020 she has presented with a normal GLENNA, double-stranded DNA antibody, inflammatory markers and complements.  She mentions at the time of her diagnosis she was started on hydroxychloroquine 200 mg twice daily which she has been on since then and follows regularly with Ophthalmology.  She did not feel like the hydroxychloroquine really impacted her symptoms and a few months after her diagnosis methotrexate was added on currently at a dose of 20 mg once weekly.  She reports that this does help to some degree.  She also has a diagnosis of fibromyalgia given the ongoing pain and eventually duloxetine was added but as she developed side effects to this it was discontinued and currently she is on gabapentin 600 mg nightly which helps with the body pain and her sleep.  She was initially prescribed 600 mg twice daily but stopped the morning dose as it was causing excessive drowsiness.  She takes an occasional over-the-counter Advil as needed which does help her.  She reports that she still experiences pain in her hands and knees without any joint swelling.  She describes morning stiffness that takes a few minutes to improve as well as gelling phenomenon.     She reports previously she was experiencing significant hair loss but this has improved since starting biotin.  She does feel warm at times but has not checked her temperature.  Intermittently she will notice a redness appearance on her neck but denies other skin rashes.  At times she has noticed a sun sensitive skin rash arise on her legs.  She thinks that she may manifest with nose and mouth sores but has never visualized this.  She reports a history of 1 episode of colitis many  years ago.  She does not experience classic Raynaud's symptoms with color changes on cold exposure but reports that with slight cold exposure she will notice icy feet.     She denies fevers, unintentional weight loss, ongoing alopecia, dry eyes, dry mouth, inflammatory eye disease, psoriasis, swollen glands, pleuritic chest pain, inflammatory bowel disease, blood clots, miscarriages or a family history of autoimmune disease.     I reviewed her most recent labs from August 2022 which showed an unremarkable CBC, CMP, ESR, CRP, C3, C4, double-stranded DNA antibody, GLENNA screen and urinalysis.        2/16/2023:  Patient presents for a follow-up of systemic lupus erythematosus and fibromyalgia.  She is currently on hydroxychloroquine 200 mg twice daily, methotrexate 20 mg once weekly with folic acid 3 mg daily and gabapentin 600 mg at bedtime.  I reviewed her testing done on 2/2/2023 and this showed a normal GLENNA specificity, ESR, CRP, C3, C4, antiphospholipid antibody testing, urine analysis, urine protein creatinine ratio, rheumatoid factor, anti-CCP antibody, ferritin, HLA-B27 antigen, chronic hepatitis panel, CBC, CMP and thyroid antibody profile.     She is not reporting any new complaints today but is continuing to experience fatigue.  She is a single parent and cares for her 11-year-old twins and 6-year-old child who has ADHD and reports that this keeps her very busy.  She is not reporting any fevers, unintentional weight loss, skin rashes, mouth/nose ulcers, swollen glands, pleuritic chest pain or new joint pains at this time.        8/17/2023:  Patient presents for a follow-up of systemic lupus erythematosus and fibromyalgia.  She is currently on hydroxychloroquine 200 mg twice daily, methotrexate 20 mg once weekly with folic acid 3 mg daily and gabapentin 600 mg at bedtime.  I reviewed her recent labs which showed a normal CBC, CMP, ESR, CRP, C3, C4, double-stranded DNA antibody, urine analysis and urine protein  creatinine ratio.     She reports over the past few months she has been seen by her primary care physician multiple times as she continues to experience a sensation like her nose and throat are swollen and irritated.  She has not seen ENT during this timeframe.  She did see an allergist and had allergy testing done which was unremarkable.  She may have received an intramuscular steroid injection in their office which significantly helped with her symptoms but reports she has continued to have recurrent complaints following this.  Most recently she was seen by her PCP and prescribed amoxicillin for 1 week with 2 days remaining.  She reports that this has helped with her symptoms.  She was also prescribed steroids but has not started this since the amoxicillin has helped.  Of note she has not held the methotrexate during her illness.     She has also been dealing with chronic low back pain and stiffness secondary to lumbar degenerative arthritis.  She was seen by spine and pain management and a spinal cord stimulator was discussed with her but she is hesitant to proceed with this.  She is trying to avoid interventional procedures as [facility] does not use general anesthesia for these procedures and she is anxious about getting them done without anesthesia.      Other than these complaints she reports chronic pain affecting her right elbow which at times can sharply radiate down her forearm.  No additional concerning joint pains.  No swollen joints.  She experiences morning stiffness affecting her diffusely especially in her low back that starts to improve gradually with activities.  She reports chronic redness on her neck which at times can flareup.  Otherwise no fevers, unintentional weight loss, new skin rashes, mouth/nose ulcers, swollen glands or pleuritic chest pain.     She has been tolerating the methotrexate and hydroxychloroquine well and has to check when she had her last annual eye exam.         2/22/2024:  Patient presents for a follow-up of systemic lupus erythematosus and fibromyalgia.  She is currently on hydroxychloroquine 200 mg twice daily, methotrexate 20 mg once weekly with folic acid 3 mg daily and gabapentin 600 mg at bedtime.  I reviewed her recent labs which showed a normal CBC, CMP, ESR, CRP, C3, C4, double-stranded DNA antibody, urine analysis and urine protein creatinine ratio.  I reviewed the x-ray of her right elbow which was normal.     She reports since last office visit she has been well without any new symptoms such as fevers, unintentional weight loss, skin rashes, mouth/nose ulcers, swollen glands, pleuritic chest pain or a flareup and joint pain/swelling/stiffness.  She may experience periodic joint pains which are manageable.  She requested a virtual visit as she was recently diagnosed with the flu and is recovering at home.     She has been tolerating the methotrexate and hydroxychloroquine well and is up-to-date with her biannual eye exams.       8/27/2024:  Patient presents for a follow-up of systemic lupus erythematosus and fibromyalgia.  She is currently on hydroxychloroquine 200 mg twice daily and methotrexate 20 mg once weekly with folic acid 3 mg daily.  I reviewed her recent labs which showed a normal CBC, CMP, ESR, CRP, C3, C4, double-stranded DNA antibody, urine analysis and urine protein creatinine ratio.     She reports since last office visit she has been well without any new symptoms such as fevers, unintentional weight loss, skin rashes, mouth/nose ulcers, swollen glands, pleuritic chest pain or a flareup and joint pain/swelling/stiffness.  She may experience periodic joint pains which are manageable.  She mentions in the past 1 week she has noticed onset of debilitating fatigue which is unusual to her.  Her symptoms have progressed over the past week.  She denies any associated symptoms or recent illnesses/infections.  She thinks that she snores at night and also  does mouth breathing.  She has interrupted sleep due to left sided sciatica.  She has never had a sleep study done.  She does not focus on lifestyle measures such as an exercise regimen or anti-inflammatory diet.     She has been tolerating the methotrexate and hydroxychloroquine well and is up-to-date with her biannual eye exams.    The following portions of the patient's history were reviewed and updated as appropriate: allergies, current medications, past family history, past medical history, past social history, past surgical history and problem list.      Review of Systems  Constitutional: Negative for weight change, fevers, chills, night sweats.  Positive for fatigue.  ENT/Mouth: Negative for hearing changes, ear pain, nasal congestion, sinus pain, hoarseness, sore throat, rhinorrhea, swallowing difficulty.   Eyes: Negative for pain, redness, discharge, vision changes.   Cardiovascular: Negative for chest pain, SOB, palpitations.   Respiratory: Negative for cough, sputum, wheezing, dyspnea.   Gastrointestinal: Negative for nausea, vomiting, diarrhea, constipation, pain, heartburn.  Genitourinary: Negative for dysuria, urinary frequency, hematuria.   Musculoskeletal: As per HPI.  Skin: Negative for skin rash, color changes.   Neuro: Negative for weakness, numbness, tingling, loss of consciousness.   Psych: Negative for anxiety, depression.   Heme/Lymph: Negative for easy bruising, bleeding, lymphadenopathy.        Past Medical History:   Diagnosis Date    Acquired ankle/foot deformity     last assessed: 8/29/2017    Anxiety     Chronic pain disorder     right foot plantar    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    Dysfunctional uterine bleeding     Last assessed: 1/9/2018    Headache     Lupus (HCC)     Mass of knee     last assessed: 5/7/2014    Mild acid reflux     Paronychia of finger     last assessed: 11/20/2013    Paronychia of toe     last asssessed: 9/28/2016. right foot    Patellofemoral dysfunction     last  assessed: 3/21/2014    PCOS (polycystic ovarian syndrome) 12/15/2007    Pes planus, congenital     last assessed: 2017    Plantar fasciitis of right foot     Plantar fibromatosis     last assessed: 2017    PONV (postoperative nausea and vomiting)     Trichomoniasis     last assessed: 2015    Vaginal candidiasis     last assessed: 2016    Varicose veins of lower extremity     last assessed: 3/5/2014    Vertigo     last assessed: 3/5/2014    Viral gastroenteritis     last assessed: 2015       Past Surgical History:   Procedure Laterality Date    BACK SURGERY      discectomy lumbar     SECTION      x2 bikini    PILONIDAL CYST EXCISION      GA HYSTEROSCOPY BX ENDOMETRIUM&/POLYPC W/WO D&C N/A 10/18/2017    Procedure: HYSTEROSCOPY AND FRACTIONAL DILATATION AND CURRETTAGE;  Surgeon: Rudy Zaragoza MD;  Location[facility] MAIN OR;  Service: Gynecology    TUBAL LIGATION  2016    WISDOM TOOTH EXTRACTION         Social History     Socioeconomic History    Marital status: Single     Spouse name: Not on file    Number of children: Not on file    Years of education: Not on file    Highest education level: Not on file   Occupational History    Not on file   Tobacco Use    Smoking status: Former     Current packs/day: 0.00     Average packs/day: 0.5 packs/day for 25.3 years (12.6 ttl pk-yrs)     Types: Cigarettes     Start date: 1984     Quit date:      Years since quittin.6    Smokeless tobacco: Never   Vaping Use    Vaping status: Never Used   Substance and Sexual Activity    Alcohol use: Yes     Comment: occ    Drug use: No    Sexual activity: Yes     Partners: Male   Other Topics Concern    Not on file   Social History Narrative    Daily cola consumption    Daily Tea Consumption     Social Determinants of Health     Financial Resource Strain: Low Risk  (3/27/2024)    [facility]    Overall Financial Resource Strain (CARDIA)     Difficulty of  Paying Living Expenses: Not hard at all   Food Insecurity: Unknown (3/27/2024)    Received from [employer] [facility]    Hunger Vital Sign     Worried About Running Out of Food in the Last Year: Never true     Ran Out of Food in the Last Year: Not on file   Transportation Needs: Unknown (3/27/2024)    [facility]    PRAPARE - Transportation     Lack of Transportation (Medical): No     Lack of Transportation (Non-Medical): Not on file   Physical Activity: Not on file   Stress: Not on file   Social Connections: Unknown (3/27/2024)    [facility]    Social Connection and Isolation Panel [NHANES]     Frequency of Communication with Friends and Family: Three times a week     Frequency of Social Gatherings with Friends and Family: Not on file     Attends Sikhism Services: Not on file     Active Member of Clubs or Organizations: Not on file     Attends Club or Organization Meetings: Not on file     Marital Status: Not on file   Intimate Partner Violence: Unknown (3/27/2024)    Received from [facility] [employer] [facility]    Humiliation, Afraid, Rape, and Kick questionnaire     Fear of Current or Ex-Partner: No     Emotionally Abused: Not on file     Physically Abused: Not on file     Sexually Abused: Not on file   Housing Stability: Not on file       Family History   Problem Relation Age of Onset    Cancer Mother         skin , gallbladder    Liver disease Mother     Hypertension Father     Heart disease Sister     Cancer Brother         skin    No Known Problems Son     No Known Problems Son     No Known Problems Daughter     No Known Problems Maternal Grandmother     No Known Problems Maternal Grandfather     No Known Problems Paternal Grandmother     No Known Problems Paternal Grandfather     No Known Problems Maternal Aunt     Colon cancer Maternal Uncle     No Known Problems Paternal Aunt     No Known Problems Paternal Uncle      "Breast cancer Cousin 32    Arthritis Family     Lung cancer Family     Uterine cancer Family     ADD / ADHD Neg Hx     Anesthesia problems Neg Hx     Clotting disorder Neg Hx     Collagen disease Neg Hx     Diabetes Neg Hx     Dislocations Neg Hx     Learning disabilities Neg Hx     Neurological problems Neg Hx     Osteoporosis Neg Hx     Rheumatologic disease Neg Hx     Scoliosis Neg Hx     Vascular Disease Neg Hx        Allergies   Allergen Reactions    Azithromycin GI Intolerance       Current Outpatient Medications:     albuterol (PROVENTIL HFA,VENTOLIN HFA) 90 mcg/act inhaler, INHALE 1 PUFF EVERY 6 (SIX) HOURS AS NEEDED FOR WHEEZING, Disp: 6.7 g, Rfl: 5    Biotin 10 MG TABS, Take by mouth, Disp: , Rfl:     cholecalciferol (VITAMIN D3) 1,000 units tablet, Take 1,000 Units by mouth daily 5000 unit daily , Disp: , Rfl:     cyanocobalamin (VITAMIN B-12) 50 MCG tablet, Take 50 mcg by mouth daily, Disp: , Rfl:     folic acid (FOLVITE) 1 mg tablet, Take 3 tablets (3 mg total) by mouth daily, Disp: 270 tablet, Rfl: 3    hydroxychloroquine (PLAQUENIL) 200 mg tablet, Take 1 tablet (200 mg total) by mouth 2 (two) times a day with meals, Disp: 180 tablet, Rfl: 3    methotrexate 2.5 MG tablet, TAKE 8 TABS ONCE WEEKLY., Disp: 96 tablet, Rfl: 1    Omega-3 Fatty Acids (FISH OIL) 1,000 mg, Take 1,000 mg by mouth daily, Disp: , Rfl:     oxymetazoline (Vicks Sinex 12 Hour Decongest) 0.05 % nasal spray, 2 sprays by Each Nare route 2 (two) times a day, Disp: , Rfl:     SUMAtriptan (IMITREX) 100 mg tablet, TAKE 1 TABLET (100 MG TOTAL) BY MOUTH ONCE AS NEEDED FOR MIGRAINE FOR UP TO 1 DOSE, Disp: 9 tablet, Rfl: 0    diazepam (VALIUM) 5 mg tablet, , Disp: , Rfl:       Objective:    Vitals:    08/27/24 0941   BP: 114/60   BP Location: Right arm   Patient Position: Sitting   Cuff Size: Adult   Pulse: 56   SpO2: 100%   Weight: 74.1 kg (163 lb 6.4 oz)   Height: 5' 7\" (1.702 m)       Physical Exam  General: Well appearing, well nourished, " in no distress. Oriented x 3, normal mood and affect.  Ambulating without difficulty.  Skin: Good turgor, no rash, unusual bruising or prominent lesions.  Hair: Normal texture and distribution.  Nails: Normal color, no deformities.  HEENT:  Head: Normocephalic, atraumatic.  Eyes: Conjunctiva clear, sclera non-icteric, EOM intact.  Extremities: No amputations or deformities, cyanosis, edema.  Neurologic: Alert and oriented. No focal neurological deficits appreciated.   Psychiatric: Normal mood and affect.       Shannon Lee M.D.  Rheumatology

## 2024-08-27 ENCOUNTER — OFFICE VISIT (OUTPATIENT)
Dept: RHEUMATOLOGY | Facility: CLINIC | Age: 49
End: 2024-08-27
Payer: COMMERCIAL

## 2024-08-27 VITALS
WEIGHT: 163.4 LBS | HEART RATE: 56 BPM | OXYGEN SATURATION: 100 % | BODY MASS INDEX: 25.65 KG/M2 | HEIGHT: 67 IN | SYSTOLIC BLOOD PRESSURE: 114 MMHG | DIASTOLIC BLOOD PRESSURE: 60 MMHG

## 2024-08-27 DIAGNOSIS — M32.9 SYSTEMIC LUPUS ERYTHEMATOSUS, UNSPECIFIED SLE TYPE, UNSPECIFIED ORGAN INVOLVEMENT STATUS (HCC): Primary | ICD-10-CM

## 2024-08-27 DIAGNOSIS — M79.7 FIBROMYALGIA: ICD-10-CM

## 2024-08-27 DIAGNOSIS — R53.82 CHRONIC FATIGUE: ICD-10-CM

## 2024-08-27 DIAGNOSIS — M15.0 PRIMARY GENERALIZED (OSTEO)ARTHRITIS: ICD-10-CM

## 2024-08-27 DIAGNOSIS — Z79.631 METHOTREXATE, LONG TERM, CURRENT USE: ICD-10-CM

## 2024-08-27 DIAGNOSIS — Z79.899 LONG-TERM USE OF PLAQUENIL: ICD-10-CM

## 2024-08-27 PROCEDURE — 99215 OFFICE O/P EST HI 40 MIN: CPT | Performed by: INTERNAL MEDICINE

## 2024-08-27 RX ORDER — FOLIC ACID 1 MG/1
3 TABLET ORAL DAILY
Qty: 270 TABLET | Refills: 3 | Status: SHIPPED | OUTPATIENT
Start: 2024-08-27

## 2024-08-27 RX ORDER — METHOTREXATE 2.5 MG/1
TABLET ORAL
Qty: 96 TABLET | Refills: 1 | Status: SHIPPED | OUTPATIENT
Start: 2024-08-27

## 2024-08-27 RX ORDER — HYDROXYCHLOROQUINE SULFATE 200 MG/1
200 TABLET, FILM COATED ORAL 2 TIMES DAILY WITH MEALS
Qty: 180 TABLET | Refills: 3 | Status: SHIPPED | OUTPATIENT
Start: 2024-08-27 | End: 2025-08-22

## 2024-09-03 ENCOUNTER — OFFICE VISIT (OUTPATIENT)
Dept: AUDIOLOGY | Facility: CLINIC | Age: 49
End: 2024-09-03
Payer: COMMERCIAL

## 2024-09-03 DIAGNOSIS — H90.3 SENSORY HEARING LOSS, BILATERAL: Primary | ICD-10-CM

## 2024-09-03 PROCEDURE — 92593 HB HEARING AID CHECK BOTH EARS: CPT | Performed by: AUDIOLOGIST

## 2024-09-06 NOTE — PROGRESS NOTES
"     Hearing Aid Visit:    Name:  Belén Piper  :  1975  Age:  49 y.o.  MRN:  [id number]  Date of Evaluation: 9/3/2024    Belén Piper was seen today (9/3/2024) for a(n) in-warranty hearing aid check of her bilateral hearing aids. Today, Ms. Piper reported that she is having continued trouble with the aids \"slipping\".    She is either not using them because of the issue or pushing them in constantly to hear better.      Device Information:       Left Device Right Device   Hearing Aid Make: Oticon Oticon   Hearing Aid Model: MORE 1 miniRITE MORE 1 miniRITE   Serial Number: [id number]   Repair Warranty Expiration Date: 2026   Loss/Damage Warranty Expiration Date:  2026    Length: 1 1    Output: 60 60   Wax System: Pro Wax miniFIT Pro Wax miniFIT   Dome Size/Style: 8mm open 8mm open   Battery: Lithium-ion Rechargeable Lithium-ion Rechargeable   Earmold Serial Number: N/A N/A   Earmold Warranty Date:  N/A N/A      Serial Number: [id number]  Accessories: N/A    Action:  Took impressions bilaterally for ear molds.  AS the aids are over a 1 1/2 old, she will need to purchase them, out of pocket, as [facility] will not cover them.   Discussed at length.     Cleaned and checked all / aids were in good working order via listening check     Recommendations:   Call patient when molds arrive.  Will accommodate / will need reprogramming / I have having them fit # 2 60's in case we need more wire length    Have her sign an ABN (discussed)       Jorge Luis Gilliland, CCC-A  Clinical Audiologist  [id number]  [facility] PROFESSIONAL [facility] AUDIOLOGY  [address] [address]  "

## 2024-09-17 DIAGNOSIS — G43.009 MIGRAINE WITHOUT AURA AND WITHOUT STATUS MIGRAINOSUS, NOT INTRACTABLE: ICD-10-CM

## 2024-09-18 RX ORDER — SUMATRIPTAN 100 MG/1
100 TABLET, FILM COATED ORAL ONCE AS NEEDED
Qty: 9 TABLET | Refills: 0 | Status: SHIPPED | OUTPATIENT
Start: 2024-09-18

## 2024-09-22 DIAGNOSIS — M32.9 SYSTEMIC LUPUS ERYTHEMATOSUS, UNSPECIFIED SLE TYPE, UNSPECIFIED ORGAN INVOLVEMENT STATUS (HCC): ICD-10-CM

## 2024-09-23 RX ORDER — METHOTREXATE 2.5 MG/1
TABLET ORAL
Qty: 96 TABLET | Refills: 0 | Status: SHIPPED | OUTPATIENT
Start: 2024-09-23

## 2024-09-23 RX ORDER — HYDROXYCHLOROQUINE SULFATE 200 MG/1
200 TABLET, FILM COATED ORAL 2 TIMES DAILY WITH MEALS
Qty: 180 TABLET | Refills: 1 | Status: SHIPPED | OUTPATIENT
Start: 2024-09-23 | End: 2025-09-18

## 2024-10-02 ENCOUNTER — OFFICE VISIT (OUTPATIENT)
Dept: AUDIOLOGY | Facility: CLINIC | Age: 49
End: 2024-10-02
Payer: COMMERCIAL

## 2024-10-02 DIAGNOSIS — H90.3 SENSORINEURAL HEARING LOSS (SNHL), BILATERAL: Primary | ICD-10-CM

## 2024-10-02 PROCEDURE — V5264 EAR MOLD/INSERT: HCPCS | Performed by: AUDIOLOGIST

## 2024-10-02 NOTE — PROGRESS NOTES
Hearing Aid Visit:    Name:  Belén Piper  :  1975  Age:  49 y.o.  MRN:  [id number]  Date of Evaluation: 9/3/2024    Belén Piper was seen today (10/2/2024) to  new earmolds for her hearing aids.     Device Information:       Left Device Right Device   Hearing Aid Make: Oticon Oticon   Hearing Aid Model: MORE 1 miniRITE MORE 1 miniRITE   Serial Number: [id number]   Repair Warranty Expiration Date: 2026   Loss/Damage Warranty Expiration Date:  2026    Length: 1 1    Output: 60 60   Wax System: Pro Wax miniFIT Pro Wax miniFIT   Dome Size/Style: N/A N/A   Battery: Lithium-ion Rechargeable Lithium-ion Rechargeable   Earmold Serial Number: [id number]   Earmold Warranty Date:  25      Serial Number: [id number]  Accessories: N/A    Action:  Put new earmolds onto hearing aids. She notices comfort of the earmolds and good sound quality    Recommendations:   Return for routine care and cleaning.     Karrie Salguero., CCC-A  Clinical Audiologist  [facility] AUDIOLOGY  [address] [id number]

## 2024-10-20 DIAGNOSIS — M32.9 SYSTEMIC LUPUS ERYTHEMATOSUS, UNSPECIFIED SLE TYPE, UNSPECIFIED ORGAN INVOLVEMENT STATUS (HCC): ICD-10-CM

## 2024-10-21 RX ORDER — METHOTREXATE 2.5 MG/1
TABLET ORAL
Qty: 96 TABLET | Refills: 1 | Status: SHIPPED | OUTPATIENT
Start: 2024-10-21

## 2024-10-21 RX ORDER — HYDROXYCHLOROQUINE SULFATE 200 MG/1
200 TABLET, FILM COATED ORAL 2 TIMES DAILY WITH MEALS
Qty: 180 TABLET | Refills: 0 | OUTPATIENT
Start: 2024-10-21 | End: 2025-10-16

## 2024-11-17 DIAGNOSIS — G43.009 MIGRAINE WITHOUT AURA AND WITHOUT STATUS MIGRAINOSUS, NOT INTRACTABLE: ICD-10-CM

## 2024-11-17 DIAGNOSIS — M32.9 SYSTEMIC LUPUS ERYTHEMATOSUS, UNSPECIFIED SLE TYPE, UNSPECIFIED ORGAN INVOLVEMENT STATUS (HCC): ICD-10-CM

## 2024-11-17 RX ORDER — HYDROXYCHLOROQUINE SULFATE 200 MG/1
200 TABLET, FILM COATED ORAL 2 TIMES DAILY WITH MEALS
Qty: 180 TABLET | Refills: 0 | Status: CANCELLED | OUTPATIENT
Start: 2024-11-17 | End: 2025-11-12

## 2024-11-19 RX ORDER — SUMATRIPTAN SUCCINATE 100 MG/1
100 TABLET ORAL ONCE AS NEEDED
Qty: 9 TABLET | Refills: 2 | Status: SHIPPED | OUTPATIENT
Start: 2024-11-19

## 2025-01-17 DIAGNOSIS — M32.9 SYSTEMIC LUPUS ERYTHEMATOSUS, UNSPECIFIED SLE TYPE, UNSPECIFIED ORGAN INVOLVEMENT STATUS (HCC): ICD-10-CM

## 2025-01-20 RX ORDER — METHOTREXATE 2.5 MG/1
TABLET ORAL
Qty: 96 TABLET | Refills: 0 | Status: SHIPPED | OUTPATIENT
Start: 2025-01-20

## 2025-01-22 ENCOUNTER — OFFICE VISIT (OUTPATIENT)
Dept: FAMILY MEDICINE CLINIC | Facility: CLINIC | Age: 50
End: 2025-01-22
Payer: COMMERCIAL

## 2025-01-22 VITALS
SYSTOLIC BLOOD PRESSURE: 110 MMHG | RESPIRATION RATE: 16 BRPM | BODY MASS INDEX: 25.99 KG/M2 | HEART RATE: 81 BPM | TEMPERATURE: 97 F | DIASTOLIC BLOOD PRESSURE: 62 MMHG | OXYGEN SATURATION: 100 % | HEIGHT: 67 IN | WEIGHT: 165.6 LBS

## 2025-01-22 DIAGNOSIS — B37.9 YEAST INFECTION: ICD-10-CM

## 2025-01-22 DIAGNOSIS — J11.1 INFLUENZA: Primary | ICD-10-CM

## 2025-01-22 PROCEDURE — 99214 OFFICE O/P EST MOD 30 MIN: CPT | Performed by: FAMILY MEDICINE

## 2025-01-22 RX ORDER — DEXTROMETHORPHAN HYDROBROMIDE AND PROMETHAZINE HYDROCHLORIDE 15; 6.25 MG/5ML; MG/5ML
5 SYRUP ORAL 4 TIMES DAILY PRN
Qty: 240 ML | Refills: 0 | Status: SHIPPED | OUTPATIENT
Start: 2025-01-22

## 2025-01-22 RX ORDER — METHYLPREDNISOLONE 4 MG/1
TABLET ORAL
Qty: 21 EACH | Refills: 0 | Status: SHIPPED | OUTPATIENT
Start: 2025-01-22

## 2025-01-22 RX ORDER — ALBUTEROL SULFATE 90 UG/1
2 INHALANT RESPIRATORY (INHALATION) EVERY 6 HOURS PRN
Qty: 6.7 G | Refills: 5 | Status: SHIPPED | OUTPATIENT
Start: 2025-01-22

## 2025-01-22 RX ORDER — FLUCONAZOLE 150 MG/1
TABLET ORAL
Qty: 2 TABLET | Refills: 0 | Status: SHIPPED | OUTPATIENT
Start: 2025-01-22 | End: 2025-01-29

## 2025-01-22 NOTE — PROGRESS NOTES
Belén Piper 1975 female MRN: [id number]    FAMILY PRACTICE OFFICE VISIT  [facility] - [facility]      ASSESSMENT/PLAN  Belén Piper is a 49 y.o. female presents to the office for    Diagnoses and all orders for this visit:    Influenza  -     methylPREDNISolone 4 MG tablet therapy pack; Use as directed on package  -     amoxicillin-clavulanate (AUGMENTIN) 875-125 mg per tablet; Take 1 tablet by mouth every 12 (twelve) hours for 7 days  -     promethazine-dextromethorphan (PHENERGAN-DM) 6.25-15 mg/5 mL oral syrup; Take 5 mL by mouth 4 (four) times a day as needed for cough  -     albuterol (Proventil HFA) 90 mcg/act inhaler; Inhale 2 puffs every 6 (six) hours as needed for wheezing    Yeast infection  -     fluconazole (DIFLUCAN) 150 mg tablet; Take 1 tablet now and take 1 tablet in 3 days    Flu A  Recommend patient's stop taking Tamiflu given side effects  Patient has left lower lobe abnormality recommend Augmentin and Medrol pack  Recommend no work till next week  If no improvement to please notify us immediately      Depression Screening and Follow-up Plan: Patient was screened for depression during today's encounter. They screened negative with a PHQ-2 score of 0.          Future Appointments   Date Time Provider Department Center   3/6/2025  3:00 PM Shannon Lee MD [facility]          SUBJECTIVE  CC: Cough (Dizziness , nausous , congestion started Saturday -covid negative Monday patient said family was positve for influenza)      HPI:  Belén Piper is a 49 y.o. female who presents for an acute appointment.  Patient states that unfortunately she was just diagnosed with influenza A.  Tested negative for COVID on Monday.  Started with symptoms on Saturday of congestion and cough.  States that she feels ill when she takes a Tamiflu.  Does admit to dizziness and nauseous nests with this illness.  States that she feels very fatigued and the cough is very  productive  Review of Systems   Constitutional:  Positive for chills, fatigue and fever. Negative for activity change and appetite change.   HENT:  Positive for congestion.    Respiratory:  Positive for cough and shortness of breath. Negative for chest tightness.    Cardiovascular:  Negative for chest pain and leg swelling.   Gastrointestinal:  Negative for abdominal distention, abdominal pain, constipation, diarrhea, nausea and vomiting.   Neurological:  Positive for dizziness.   All other systems reviewed and are negative.      Historical Information   The patient history was reviewed as follows:  Past Medical History:   Diagnosis Date   • Acquired ankle/foot deformity     last assessed: 8/29/2017   • Anxiety    • Chronic pain disorder     right foot plantar   • 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   • Dysfunctional uterine bleeding     Last assessed: 1/9/2018   • Headache    • Lupus    • Mass of knee     last assessed: 5/7/2014   • Mild acid reflux    • Paronychia of finger     last assessed: 11/20/2013   • Paronychia of toe     last asssessed: 9/28/2016. right foot   • Patellofemoral dysfunction     last assessed: 3/21/2014   • PCOS (polycystic ovarian syndrome) 12/15/2007   • Pes planus, congenital     last assessed: 8/29/2017   • Plantar fasciitis of right foot    • Plantar fibromatosis     last assessed: 9/28/2017   • PONV (postoperative nausea and vomiting)    • Trichomoniasis     last assessed: 1/16/2015   • Vaginal candidiasis     last assessed: 11/30/2016   • Varicose veins of lower extremity     last assessed: 3/5/2014   • Vertigo     last assessed: 3/5/2014   • Viral gastroenteritis     last assessed: 6/4/2015         Medications:     Current Outpatient Medications:   •  albuterol (Proventil HFA) 90 mcg/act inhaler, Inhale 2 puffs every 6 (six) hours as needed for wheezing, Disp: 6.7 g, Rfl: 5  •  albuterol (PROVENTIL HFA,VENTOLIN HFA) 90 mcg/act inhaler, INHALE 1 PUFF EVERY 6 (SIX) HOURS AS NEEDED FOR WHEEZING,  "Disp: 6.7 g, Rfl: 5  •  amoxicillin-clavulanate (AUGMENTIN) 875-125 mg per tablet, Take 1 tablet by mouth every 12 (twelve) hours for 7 days, Disp: 14 tablet, Rfl: 0  •  Biotin 10 MG TABS, Take by mouth, Disp: , Rfl:   •  cholecalciferol (VITAMIN D3) 1,000 units tablet, Take 1,000 Units by mouth daily 5000 unit daily , Disp: , Rfl:   •  cyanocobalamin (VITAMIN B-12) 50 MCG tablet, Take 50 mcg by mouth daily, Disp: , Rfl:   •  fluconazole (DIFLUCAN) 150 mg tablet, Take 1 tablet now and take 1 tablet in 3 days, Disp: 2 tablet, Rfl: 0  •  folic acid (FOLVITE) 1 mg tablet, Take 3 tablets (3 mg total) by mouth daily, Disp: 270 tablet, Rfl: 3  •  hydroxychloroquine (PLAQUENIL) 200 mg tablet, Take 1 tablet (200 mg total) by mouth 2 (two) times a day with meals, Disp: 180 tablet, Rfl: 1  •  methotrexate 2.5 MG tablet, TAKE 8 TABS ONCE WEEKLY., Disp: 96 tablet, Rfl: 0  •  methylPREDNISolone 4 MG tablet therapy pack, Use as directed on package, Disp: 21 each, Rfl: 0  •  Omega-3 Fatty Acids (FISH OIL) 1,000 mg, Take 1,000 mg by mouth daily, Disp: , Rfl:   •  oxymetazoline (Vicks Sinex 12 Hour Decongest) 0.05 % nasal spray, 2 sprays by Each Nare route 2 (two) times a day, Disp: , Rfl:   •  promethazine-dextromethorphan (PHENERGAN-DM) 6.25-15 mg/5 mL oral syrup, Take 5 mL by mouth 4 (four) times a day as needed for cough, Disp: 240 mL, Rfl: 0  •  SUMAtriptan (IMITREX) 100 mg tablet, Take 1 tablet (100 mg total) by mouth once as needed for migraine for up to 9 doses, Disp: 9 tablet, Rfl: 2  •  diazepam (VALIUM) 5 mg tablet, , Disp: , Rfl:     Allergies   Allergen Reactions   • Azithromycin GI Intolerance       OBJECTIVE  Vitals:   Vitals:    01/22/25 0935   BP: 110/62   BP Location: Left arm   Patient Position: Sitting   Cuff Size: Large   Pulse: 81   Resp: 16   Temp: (!) 97 °F (36.1 °C)   TempSrc: Temporal   SpO2: 100%   Weight: 75.1 kg (165 lb 9.6 oz)   Height: 5' 7\" (1.702 m)         Physical Exam  Vitals reviewed. "   Constitutional:       Appearance: She is well-developed.   HENT:      Head: Normocephalic and atraumatic.   Eyes:      Conjunctiva/sclera: Conjunctivae normal.      Pupils: Pupils are equal, round, and reactive to light.   Cardiovascular:      Rate and Rhythm: Normal rate and regular rhythm.      Heart sounds: Normal heart sounds, S1 normal and S2 normal. No murmur heard.  Pulmonary:      Effort: Pulmonary effort is normal. No respiratory distress.      Breath sounds: Rhonchi present. No wheezing.      Comments: Left lower lobe  Musculoskeletal:         General: Normal range of motion.      Cervical back: Normal range of motion and neck supple.   Skin:     General: Skin is warm.   Neurological:      Mental Status: She is alert and oriented to person, place, and time.   Psychiatric:         Speech: Speech normal.         Behavior: Behavior normal.         Thought Content: Thought content normal.         Judgment: Judgment normal.                    Lisbeth Zamudio MD,   [facility]  1/22/2025

## 2025-02-22 DIAGNOSIS — J11.1 INFLUENZA: ICD-10-CM

## 2025-02-22 DIAGNOSIS — M32.9 SYSTEMIC LUPUS ERYTHEMATOSUS, UNSPECIFIED SLE TYPE, UNSPECIFIED ORGAN INVOLVEMENT STATUS (HCC): ICD-10-CM

## 2025-02-24 RX ORDER — METHOTREXATE 2.5 MG/1
TABLET ORAL
Qty: 96 TABLET | Refills: 0 | Status: SHIPPED | OUTPATIENT
Start: 2025-02-24

## 2025-02-24 RX ORDER — METHYLPREDNISOLONE 4 MG/1
TABLET ORAL
Qty: 21 EACH | Refills: 0 | Status: SHIPPED | OUTPATIENT
Start: 2025-02-24

## 2025-02-28 ENCOUNTER — APPOINTMENT (OUTPATIENT)
Dept: LAB | Facility: HOSPITAL | Age: 50
End: 2025-02-28
Attending: INTERNAL MEDICINE
Payer: COMMERCIAL

## 2025-03-04 PROBLEM — Z79.899 LONG-TERM USE OF PLAQUENIL: Status: ACTIVE | Noted: 2025-03-04

## 2025-03-04 PROBLEM — Z79.631 METHOTREXATE, LONG TERM, CURRENT USE: Status: ACTIVE | Noted: 2025-03-04

## 2025-03-04 PROBLEM — M15.0 PRIMARY GENERALIZED (OSTEO)ARTHRITIS: Status: ACTIVE | Noted: 2025-03-04

## 2025-03-04 PROBLEM — M79.7 FIBROMYALGIA: Status: ACTIVE | Noted: 2025-03-04

## 2025-03-06 ENCOUNTER — OFFICE VISIT (OUTPATIENT)
Dept: RHEUMATOLOGY | Facility: CLINIC | Age: 50
End: 2025-03-06

## 2025-03-06 VITALS
HEIGHT: 67 IN | OXYGEN SATURATION: 100 % | HEART RATE: 72 BPM | SYSTOLIC BLOOD PRESSURE: 120 MMHG | BODY MASS INDEX: 25.52 KG/M2 | DIASTOLIC BLOOD PRESSURE: 70 MMHG | WEIGHT: 162.6 LBS

## 2025-03-06 DIAGNOSIS — Z79.899 LONG-TERM USE OF PLAQUENIL: ICD-10-CM

## 2025-03-06 DIAGNOSIS — Z79.631 METHOTREXATE, LONG TERM, CURRENT USE: ICD-10-CM

## 2025-03-06 DIAGNOSIS — M15.0 PRIMARY GENERALIZED (OSTEO)ARTHRITIS: ICD-10-CM

## 2025-03-06 DIAGNOSIS — M32.9 SYSTEMIC LUPUS ERYTHEMATOSUS, UNSPECIFIED SLE TYPE, UNSPECIFIED ORGAN INVOLVEMENT STATUS (HCC): Primary | ICD-10-CM

## 2025-03-06 DIAGNOSIS — M79.7 FIBROMYALGIA: ICD-10-CM

## 2025-03-06 NOTE — ASSESSMENT & PLAN NOTE
Ms. Piper a 49-year-old female with history significant for systemic lupus erythematosus and fibromyalgia who presents for a follow-up.  She is currently on hydroxychloroquine 200 mg twice daily and methotrexate 20 mg once weekly with folic acid 3 mg daily.       # Systemic lupus erythematosus  - Belén presents today for a follow-up of systemic lupus erythematosus and fibromyalgia that was diagnosed in 2017.  Since then she has been on hydroxychloroquine at 200 mg twice daily and methotrexate 20 mg once weekly which has overall kept her stable.  She is not reporting any symptoms today suggestive of activity related to the lupus and reassuringly her recent serologies have all been normal.  She will continue the same regimen unchanged and update lupus related lab monitoring and high risk medication lab monitoring to assess for drug toxicities prior to the follow-up visit.  She is up-to-date with her biannual eye exams, with her last exam done in 11/24 being normal.    - For arthralgias she experiences which I suspect are non inflammatory in nature I requested she trial topical diclofenac gel 3-4 times daily as needed and OTC Tylenol/NSAIDs as needed.      # Chronic low back pain secondary to lumbar degenerative arthritis  - I encouraged her to rethink the spinal stimulator or interventional procedures and follow-up with spine and pain management for further guidance.    Orders:    CBC and differential; Future    Comprehensive metabolic panel; Future    C-reactive protein; Future    Sedimentation rate, automated; Future    C4 complement; Future    C3 complement; Future    Anti-DNA antibody, double-stranded; Future    Urinalysis with microscopic; Future    Protein / creatinine ratio, urine; Future

## 2025-03-06 NOTE — PROGRESS NOTES
Name: Belén Piper      : 1975      MRN: [id number]  Encounter Provider: Shannon Lee MD  Encounter Date: 3/6/2025   Encounter department: [facility]  :  Assessment & Plan  Systemic lupus erythematosus, unspecified SLE type, unspecified organ involvement status (HCC)  Ms. Piper a 49-year-old female with history significant for systemic lupus erythematosus and fibromyalgia who presents for a follow-up.  She is currently on hydroxychloroquine 200 mg twice daily and methotrexate 20 mg once weekly with folic acid 3 mg daily.       # Systemic lupus erythematosus  - Belén presents today for a follow-up of systemic lupus erythematosus and fibromyalgia that was diagnosed in 2017.  Since then she has been on hydroxychloroquine at 200 mg twice daily and methotrexate 20 mg once weekly which has overall kept her stable.  She is not reporting any symptoms today suggestive of activity related to the lupus and reassuringly her recent serologies have all been normal.  She will continue the same regimen unchanged and update lupus related lab monitoring and high risk medication lab monitoring to assess for drug toxicities prior to the follow-up visit.  She is up-to-date with her biannual eye exams, with her last exam done in  being normal.    - For arthralgias she experiences which I suspect are non inflammatory in nature I requested she trial topical diclofenac gel 3-4 times daily as needed and OTC Tylenol/NSAIDs as needed.      # Chronic low back pain secondary to lumbar degenerative arthritis  - I encouraged her to rethink the spinal stimulator or interventional procedures and follow-up with spine and pain management for further guidance.    Orders:    CBC and differential; Future    Comprehensive metabolic panel; Future    C-reactive protein; Future    Sedimentation rate, automated; Future    C4 complement; Future    C3 complement; Future    Anti-DNA antibody, double-stranded;  Future    Urinalysis with microscopic; Future    Protein / creatinine ratio, urine; Future    Long-term use of Plaquenil         Methotrexate, long term, current use         Fibromyalgia         Primary generalized (osteo)arthritis           Patient's rheumatologic disease(s) threaten long-term function if not appropriately managed.      History of Present Illness   HPI    INITIAL VISIT NOTE (10/2022):  Ms. Piper a 47-year-old female with history significant for systemic lupus erythematosus and fibromyalgia who presents to [facility] with [facility].  She is currently on hydroxychloroquine 200 mg twice daily, methotrexate 20 mg once weekly with folic acid 1 mg daily and gabapentin 600 mg at bedtime.  She is transferring care from Dr. Laly Wilkerson.  She is self-referred today.       I am unable to access her initial labs but based on her prior rheumatology records she was diagnosed with systemic lupus erythematosus in 2017 when she presented with a positive GLENNA, double-stranded DNA antibody as well as diffuse arthralgias.  Based on labs that I am able to access dating back to 2020 she has presented with a normal GLENNA, double-stranded DNA antibody, inflammatory markers and complements.  She mentions at the time of her diagnosis she was started on hydroxychloroquine 200 mg twice daily which she has been on since then and follows regularly with Ophthalmology.  She did not feel like the hydroxychloroquine really impacted her symptoms and a few months after her diagnosis methotrexate was added on currently at a dose of 20 mg once weekly.  She reports that this does help to some degree.  She also has a diagnosis of fibromyalgia given the ongoing pain and eventually duloxetine was added but as she developed side effects to this it was discontinued and currently she is on gabapentin 600 mg nightly which helps with the body pain and her sleep.  She was initially prescribed 600 mg twice daily but stopped the  morning dose as it was causing excessive drowsiness.  She takes an occasional over-the-counter Advil as needed which does help her.  She reports that she still experiences pain in her hands and knees without any joint swelling.  She describes morning stiffness that takes a few minutes to improve as well as gelling phenomenon.     She reports previously she was experiencing significant hair loss but this has improved since starting biotin.  She does feel warm at times but has not checked her temperature.  Intermittently she will notice a redness appearance on her neck but denies other skin rashes.  At times she has noticed a sun sensitive skin rash arise on her legs.  She thinks that she may manifest with nose and mouth sores but has never visualized this.  She reports a history of 1 episode of colitis many years ago.  She does not experience classic Raynaud's symptoms with color changes on cold exposure but reports that with slight cold exposure she will notice icy feet.     She denies fevers, unintentional weight loss, ongoing alopecia, dry eyes, dry mouth, inflammatory eye disease, psoriasis, swollen glands, pleuritic chest pain, inflammatory bowel disease, blood clots, miscarriages or a family history of autoimmune disease.     I reviewed her most recent labs from August 2022 which showed an unremarkable CBC, CMP, ESR, CRP, C3, C4, double-stranded DNA antibody, GLENNA screen and urinalysis.        2/16/2023:  Patient presents for a follow-up of systemic lupus erythematosus and fibromyalgia.  She is currently on hydroxychloroquine 200 mg twice daily, methotrexate 20 mg once weekly with folic acid 3 mg daily and gabapentin 600 mg at bedtime.  I reviewed her testing done on 2/2/2023 and this showed a normal GLENNA specificity, ESR, CRP, C3, C4, antiphospholipid antibody testing, urine analysis, urine protein creatinine ratio, rheumatoid factor, anti-CCP antibody, ferritin, HLA-B27 antigen, chronic hepatitis panel, CBC,  CMP and thyroid antibody profile.     She is not reporting any new complaints today but is continuing to experience fatigue.  She is a single parent and cares for her 11-year-old twins and 6-year-old child who has ADHD and reports that this keeps her very busy.  She is not reporting any fevers, unintentional weight loss, skin rashes, mouth/nose ulcers, swollen glands, pleuritic chest pain or new joint pains at this time.        8/17/2023:  Patient presents for a follow-up of systemic lupus erythematosus and fibromyalgia.  She is currently on hydroxychloroquine 200 mg twice daily, methotrexate 20 mg once weekly with folic acid 3 mg daily and gabapentin 600 mg at bedtime.  I reviewed her recent labs which showed a normal CBC, CMP, ESR, CRP, C3, C4, double-stranded DNA antibody, urine analysis and urine protein creatinine ratio.     She reports over the past few months she has been seen by her primary care physician multiple times as she continues to experience a sensation like her nose and throat are swollen and irritated.  She has not seen ENT during this timeframe.  She did see an allergist and had allergy testing done which was unremarkable.  She may have received an intramuscular steroid injection in their office which significantly helped with her symptoms but reports she has continued to have recurrent complaints following this.  Most recently she was seen by her PCP and prescribed amoxicillin for 1 week with 2 days remaining.  She reports that this has helped with her symptoms.  She was also prescribed steroids but has not started this since the amoxicillin has helped.  Of note she has not held the methotrexate during her illness.     She has also been dealing with chronic low back pain and stiffness secondary to lumbar degenerative arthritis.  She was seen by spine and pain management and a spinal cord stimulator was discussed with her but she is hesitant to proceed with this.  She is trying to avoid  interventional procedures as [facility] does not use general anesthesia for these procedures and she is anxious about getting them done without anesthesia.      Other than these complaints she reports chronic pain affecting her right elbow which at times can sharply radiate down her forearm.  No additional concerning joint pains.  No swollen joints.  She experiences morning stiffness affecting her diffusely especially in her low back that starts to improve gradually with activities.  She reports chronic redness on her neck which at times can flareup.  Otherwise no fevers, unintentional weight loss, new skin rashes, mouth/nose ulcers, swollen glands or pleuritic chest pain.     She has been tolerating the methotrexate and hydroxychloroquine well and has to check when she had her last annual eye exam.        2/22/2024:  Patient presents for a follow-up of systemic lupus erythematosus and fibromyalgia.  She is currently on hydroxychloroquine 200 mg twice daily, methotrexate 20 mg once weekly with folic acid 3 mg daily and gabapentin 600 mg at bedtime.  I reviewed her recent labs which showed a normal CBC, CMP, ESR, CRP, C3, C4, double-stranded DNA antibody, urine analysis and urine protein creatinine ratio.  I reviewed the x-ray of her right elbow which was normal.     She reports since last office visit she has been well without any new symptoms such as fevers, unintentional weight loss, skin rashes, mouth/nose ulcers, swollen glands, pleuritic chest pain or a flareup and joint pain/swelling/stiffness.  She may experience periodic joint pains which are manageable.  She requested a virtual visit as she was recently diagnosed with the flu and is recovering at home.     She has been tolerating the methotrexate and hydroxychloroquine well and is up-to-date with her biannual eye exams.        8/27/2024:  Patient presents for a follow-up of systemic lupus erythematosus and fibromyalgia.  She is currently on  hydroxychloroquine 200 mg twice daily and methotrexate 20 mg once weekly with folic acid 3 mg daily.  I reviewed her recent labs which showed a normal CBC, CMP, ESR, CRP, C3, C4, double-stranded DNA antibody, urine analysis and urine protein creatinine ratio.      She reports since last office visit she has been well without any new symptoms such as fevers, unintentional weight loss, skin rashes, mouth/nose ulcers, swollen glands, pleuritic chest pain or a flareup and joint pain/swelling/stiffness.  She may experience periodic joint pains which are manageable.  She mentions in the past 1 week she has noticed onset of debilitating fatigue which is unusual to her.  Her symptoms have progressed over the past week.  She denies any associated symptoms or recent illnesses/infections.  She thinks that she snores at night and also does mouth breathing.  She has interrupted sleep due to left sided sciatica.  She has never had a sleep study done.  She does not focus on lifestyle measures such as an exercise regimen or anti-inflammatory diet.     She has been tolerating the methotrexate and hydroxychloroquine well and is up-to-date with her biannual eye exams.      3/6/2025:  Patient presents for a follow-up of systemic lupus erythematosus and fibromyalgia.  She is currently on hydroxychloroquine 200 mg twice daily and methotrexate 20 mg once weekly with folic acid 3 mg daily.  I reviewed her recent labs which showed a normal CBC, CMP (mild decrease in potassium), ESR, CRP, C3, C4, double-stranded DNA antibody, urine analysis and urine protein creatinine ratio.      She reports since last office visit she has been well without any new symptoms such as fevers, unintentional weight loss, skin rashes, mouth/nose ulcers, swollen glands, pleuritic chest pain or a flareup and joint pain/swelling/stiffness.  She still experiences joint aches mostly affecting her hands and left elbow with a burning sensation.  She does have Tylenol  and NSAIDs at home but has not tried anything consistently for this.    She has been tolerating the methotrexate and hydroxychloroquine well and is up-to-date with her biannual eye exams.          Review of Systems  Constitutional: Negative for weight change, fevers, chills, night sweats, fatigue.  ENT/Mouth: Negative for hearing changes, ear pain, nasal congestion, sinus pain, hoarseness, sore throat, rhinorrhea, swallowing difficulty.   Eyes: Negative for pain, redness, discharge, vision changes.   Cardiovascular: Negative for chest pain, SOB, palpitations.   Respiratory: Negative for cough, sputum, wheezing, dyspnea.   Gastrointestinal: Negative for nausea, vomiting, diarrhea, constipation, pain, heartburn.  Genitourinary: Negative for dysuria, urinary frequency, hematuria.   Musculoskeletal: As per HPI.  Skin: Negative for color changes.  Positive for skin rash.  Neuro: Negative for weakness, numbness, tingling, loss of consciousness.   Psych: Negative for anxiety, depression.   Heme/Lymph: Negative for easy bruising, bleeding, lymphadenopathy.      Past Medical History   Past Medical History:   Diagnosis Date    Acquired ankle/foot deformity     last assessed: 8/29/2017    Anxiety     Chronic pain disorder     right foot plantar    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    Dysfunctional uterine bleeding     Last assessed: 1/9/2018    Headache     Lupus     Mass of knee     last assessed: 5/7/2014    Mild acid reflux     Paronychia of finger     last assessed: 11/20/2013    Paronychia of toe     last asssessed: 9/28/2016. right foot    Patellofemoral dysfunction     last assessed: 3/21/2014    PCOS (polycystic ovarian syndrome) 12/15/2007    Pes planus, congenital     last assessed: 8/29/2017    Plantar fasciitis of right foot     Plantar fibromatosis     last assessed: 9/28/2017    PONV (postoperative nausea and vomiting)     Trichomoniasis     last assessed: 1/16/2015    Vaginal candidiasis     last assessed: 11/30/2016     Varicose veins of lower extremity     last assessed: 3/5/2014    Vertigo     last assessed: 3/5/2014    Viral gastroenteritis     last assessed: 2015     Past Surgical History:   Procedure Laterality Date    BACK SURGERY      discectomy lumbar     SECTION      x2 bikini    PILONIDAL CYST EXCISION      NV HYSTEROSCOPY BX ENDOMETRIUM&/POLYPC W/WO D&C N/A 10/18/2017    Procedure: HYSTEROSCOPY AND FRACTIONAL DILATATION AND CURRETTAGE;  Surgeon: Rudy Zaragoza MD;  Location: [facility] MAIN OR;  Service: Gynecology    TUBAL LIGATION  2016    WISDOM TOOTH EXTRACTION       Family History   Problem Relation Age of Onset    Cancer Mother         skin , gallbladder    Liver disease Mother     Hypertension Father     Heart disease Sister     Cancer Brother         skin    No Known Problems Son     No Known Problems Son     No Known Problems Daughter     No Known Problems Maternal Grandmother     No Known Problems Maternal Grandfather     No Known Problems Paternal Grandmother     No Known Problems Paternal Grandfather     No Known Problems Maternal Aunt     Colon cancer Maternal Uncle     No Known Problems Paternal Aunt     No Known Problems Paternal Uncle     Breast cancer Cousin 32    Arthritis Family     Lung cancer Family     Uterine cancer Family     ADD / ADHD Neg Hx     Anesthesia problems Neg Hx     Clotting disorder Neg Hx     Collagen disease Neg Hx     Diabetes Neg Hx     Dislocations Neg Hx     Learning disabilities Neg Hx     Neurological problems Neg Hx     Osteoporosis Neg Hx     Rheumatologic disease Neg Hx     Scoliosis Neg Hx     Vascular Disease Neg Hx       reports that she quit smoking about 15 years ago. Her smoking use included cigarettes. She started smoking about 40 years ago. She has a 12.6 pack-year smoking history. She has never used smokeless tobacco. She reports current alcohol use. She reports that she does not use drugs.  Current Outpatient Medications   Medication Instructions     albuterol (Proventil HFA) 90 mcg/act inhaler 2 puffs, Inhalation, Every 6 hours PRN    albuterol (PROVENTIL HFA,VENTOLIN HFA) 90 mcg/act inhaler 1 puff, Inhalation, Every 6 hours PRN    Biotin 10 MG TABS Take by mouth    cholecalciferol (VITAMIN D3) 1,000 Units, Daily    cyanocobalamin (VITAMIN B-12) 50 mcg, Daily    diazepam (VALIUM) 5 mg tablet     fish oil 1,000 mg, Daily    folic acid (FOLVITE) 3 mg, Oral, Daily    hydroxychloroquine (PLAQUENIL) 200 mg, Oral, 2 times daily with meals    methotrexate 2.5 MG tablet TAKE 8 TABS ONCE WEEKLY.    methylPREDNISolone 4 MG tablet therapy pack Use as directed on package    oxymetazoline (Vicks Sinex 12 Hour Decongest) 0.05 % nasal spray 2 sprays, 2 times daily    promethazine-dextromethorphan (PHENERGAN-DM) 6.25-15 mg/5 mL oral syrup 5 mL, Oral, 4 times daily PRN    SUMAtriptan (IMITREX) 100 mg, Oral, Once as needed     Allergies   Allergen Reactions    Azithromycin GI Intolerance      Current Outpatient Medications on File Prior to Visit   Medication Sig Dispense Refill    albuterol (Proventil HFA) 90 mcg/act inhaler Inhale 2 puffs every 6 (six) hours as needed for wheezing 6.7 g 5    albuterol (PROVENTIL HFA,VENTOLIN HFA) 90 mcg/act inhaler INHALE 1 PUFF EVERY 6 (SIX) HOURS AS NEEDED FOR WHEEZING 6.7 g 5    Biotin 10 MG TABS Take by mouth      cholecalciferol (VITAMIN D3) 1,000 units tablet Take 1,000 Units by mouth daily 5000 unit daily       cyanocobalamin (VITAMIN B-12) 50 MCG tablet Take 50 mcg by mouth daily      folic acid (FOLVITE) 1 mg tablet Take 3 tablets (3 mg total) by mouth daily 270 tablet 3    hydroxychloroquine (PLAQUENIL) 200 mg tablet Take 1 tablet (200 mg total) by mouth 2 (two) times a day with meals 180 tablet 1    methotrexate 2.5 MG tablet TAKE 8 TABS ONCE WEEKLY. 96 tablet 0    methylPREDNISolone 4 MG tablet therapy pack Use as directed on package 21 each 0    Omega-3 Fatty Acids (FISH OIL) 1,000 mg Take 1,000 mg by mouth daily      oxymetazoline  "(Vicks Sinex 12 Hour Decongest) 0.05 % nasal spray 2 sprays by Each Nare route 2 (two) times a day      promethazine-dextromethorphan (PHENERGAN-DM) 6.25-15 mg/5 mL oral syrup Take 5 mL by mouth 4 (four) times a day as needed for cough 240 mL 0    SUMAtriptan (IMITREX) 100 mg tablet Take 1 tablet (100 mg total) by mouth once as needed for migraine for up to 9 doses 9 tablet 2    diazepam (VALIUM) 5 mg tablet  (Patient not taking: Reported on 3/6/2025)       No current facility-administered medications on file prior to visit.      Social History     Tobacco Use    Smoking status: Former     Current packs/day: 0.00     Average packs/day: 0.5 packs/day for 25.3 years (12.6 ttl pk-yrs)     Types: Cigarettes     Start date: 9/23/1984     Quit date: 2010     Years since quitting: 15.1    Smokeless tobacco: Never   Vaping Use    Vaping status: Never Used   Substance and Sexual Activity    Alcohol use: Yes     Comment: occ    Drug use: No    Sexual activity: Yes     Partners: Male        Objective   /70 (BP Location: Right arm, Patient Position: Sitting, Cuff Size: Adult)   Pulse 72   Ht 5' 7\" (1.702 m)   Wt 73.8 kg (162 lb 9.6 oz)   SpO2 100%   BMI 25.47 kg/m²      Physical Exam  General: Well appearing, well nourished, in no distress. Oriented x 3, normal mood and affect.  Ambulating without difficulty.  Skin: Good turgor, no unusual bruising or prominent lesions.  Chronic intermittent erythema on her neck.   Hair: Hair thinning noted.  Nails: Normal color, no deformities.  HEENT:  Head: Normocephalic, atraumatic.  Eyes: Conjunctiva clear, sclera non-icteric, EOM intact.  Nose: No external lesions.  Neck: Supple.  Extremities: No amputations or deformities, cyanosis, edema.  Musculoskeletal:   Hands and left elbow - tender to palpation, no soft tissue swelling noted.  Neurologic: Alert and oriented. No focal neurological deficits appreciated.   Psychiatric: Normal mood and affect.       "

## 2025-03-07 ENCOUNTER — OFFICE VISIT (OUTPATIENT)
Dept: FAMILY MEDICINE CLINIC | Facility: CLINIC | Age: 50
End: 2025-03-07
Payer: COMMERCIAL

## 2025-03-07 VITALS
RESPIRATION RATE: 16 BRPM | OXYGEN SATURATION: 99 % | WEIGHT: 160 LBS | HEIGHT: 67 IN | SYSTOLIC BLOOD PRESSURE: 90 MMHG | BODY MASS INDEX: 25.11 KG/M2 | TEMPERATURE: 98 F | HEART RATE: 70 BPM | DIASTOLIC BLOOD PRESSURE: 58 MMHG

## 2025-03-07 DIAGNOSIS — G43.009 MIGRAINE WITHOUT AURA AND WITHOUT STATUS MIGRAINOSUS, NOT INTRACTABLE: Primary | ICD-10-CM

## 2025-03-07 DIAGNOSIS — M77.8 ELBOW TENDONITIS: ICD-10-CM

## 2025-03-07 DIAGNOSIS — Z12.31 ENCOUNTER FOR SCREENING MAMMOGRAM FOR MALIGNANT NEOPLASM OF BREAST: ICD-10-CM

## 2025-03-07 DIAGNOSIS — R53.82 CHRONIC FATIGUE: ICD-10-CM

## 2025-03-07 DIAGNOSIS — T14.8XXA BRUISE: ICD-10-CM

## 2025-03-07 DIAGNOSIS — E87.6 LOW BLOOD POTASSIUM: ICD-10-CM

## 2025-03-07 DIAGNOSIS — M79.642 LEFT HAND PAIN: ICD-10-CM

## 2025-03-07 PROCEDURE — 99214 OFFICE O/P EST MOD 30 MIN: CPT | Performed by: FAMILY MEDICINE

## 2025-03-07 RX ORDER — SUMATRIPTAN SUCCINATE 100 MG/1
100 TABLET ORAL ONCE AS NEEDED
Qty: 9 TABLET | Refills: 2 | Status: SHIPPED | OUTPATIENT
Start: 2025-03-07

## 2025-03-07 NOTE — ASSESSMENT & PLAN NOTE
Refilled medication today  Orders:  •  SUMAtriptan (IMITREX) 100 mg tablet; Take 1 tablet (100 mg total) by mouth once as needed for migraine for up to 9 doses

## 2025-03-07 NOTE — PROGRESS NOTES
Name: Belén Piper      : 1975      MRN: [id number]  Encounter Provider: Lisbeth Zamudio MD  Encounter Date: 3/7/2025   Encounter department: [facility]    Assessment & Plan  Migraine without aura and without status migrainosus, not intractable  Refilled medication today  Orders:  •  SUMAtriptan (IMITREX) 100 mg tablet; Take 1 tablet (100 mg total) by mouth once as needed for migraine for up to 9 doses    Encounter for screening mammogram for malignant neoplasm of breast    Orders:  •  Mammo screening bilateral w 3d and cad; Future    Chronic fatigue  Secondary to lupus versus vitamin deficiency versus other etiologies we will do a full workup just to be safe  Orders:  •  Basic metabolic panel; Future  •  Vitamin B12; Future  •  Vitamin D 25 hydroxy; Future  •  Fe+TIBC+Junaid; Future    Elbow tendonitis  Encouraged to get elbow brace on [employer] if no improvement will refer patient directly to Ortho         Low blood potassium  Repeat before doing any further actions  Orders:  •  Basic metabolic panel; Future    Left hand pain  Patient was advised by rheumatology that it was not secondary to lupus therefore we will exclude osteoarthritis on x-ray.    Orders:  •  XR hand 3+ vw left; Future    Bruise  Likely secondary to fish oil  CBC seem to be stable.  Will continue to monitor  Has him on the inner thighs            History of Present Illness     HPI  49-year-old female presenting to the office accompanied by her kids.  States that she was advised to follow-up about her potassium that was low.  She had the blood work done by rheumatology.  Patient states that she has joint pains in her hands but was advised that it was not secondary to lupus.  She states that her fatigue is generally worse and unable to function on a daily basis.  States that she is also been having elbow discomfort on the left      Review of Systems   Constitutional:  Positive for fatigue. Negative for  activity change, appetite change, chills and fever.   HENT:  Negative for congestion.    Respiratory:  Negative for cough, chest tightness and shortness of breath.    Cardiovascular:  Negative for chest pain and leg swelling.   Gastrointestinal:  Negative for abdominal distention, abdominal pain, constipation, diarrhea, nausea and vomiting.   Musculoskeletal:  Positive for arthralgias.   All other systems reviewed and are negative.    Past Medical History:   Diagnosis Date   • Acquired ankle/foot deformity     last assessed: 2017   • Anxiety    • Chronic pain disorder     right foot plantar   • Dermatomycosis 2008   • Dysfunctional uterine bleeding     Last assessed: 2018   • Headache    • Lupus    • Mass of knee     last assessed: 2014   • Mild acid reflux    • Paronychia of finger     last assessed: 2013   • Paronychia of toe     last asssessed: 2016. right foot   • Patellofemoral dysfunction     last assessed: 3/21/2014   • PCOS (polycystic ovarian syndrome) 12/15/2007   • Pes planus, congenital     last assessed: 2017   • Plantar fasciitis of right foot    • Plantar fibromatosis     last assessed: 2017   • PONV (postoperative nausea and vomiting)    • Trichomoniasis     last assessed: 2015   • Vaginal candidiasis     last assessed: 2016   • Varicose veins of lower extremity     last assessed: 3/5/2014   • Vertigo     last assessed: 3/5/2014   • Viral gastroenteritis     last assessed: 2015     Past Surgical History:   Procedure Laterality Date   • BACK SURGERY      discectomy lumbar   •  SECTION      x2 bikini   • PILONIDAL CYST EXCISION     • MA HYSTEROSCOPY BX ENDOMETRIUM&/POLYPC W/WO D&C N/A 10/18/2017    Procedure: HYSTEROSCOPY AND FRACTIONAL DILATATION AND CURRETTAGE;  Surgeon: Rudy Zaragoza MD;  Location: [facility] MAIN OR;  Service: Gynecology   • TUBAL LIGATION     • WISDOM TOOTH EXTRACTION       Family History   Problem Relation Age of  Onset   • Cancer Mother         skin , gallbladder   • Liver disease Mother    • Hypertension Father    • Heart disease Sister    • Cancer Brother         skin   • No Known Problems Son    • No Known Problems Son    • No Known Problems Daughter    • No Known Problems Maternal Grandmother    • No Known Problems Maternal Grandfather    • No Known Problems Paternal Grandmother    • No Known Problems Paternal Grandfather    • No Known Problems Maternal Aunt    • Colon cancer Maternal Uncle    • No Known Problems Paternal Aunt    • No Known Problems Paternal Uncle    • Breast cancer Cousin 32   • Arthritis Family    • Lung cancer Family    • Uterine cancer Family    • ADD / ADHD Neg Hx    • Anesthesia problems Neg Hx    • Clotting disorder Neg Hx    • Collagen disease Neg Hx    • Diabetes Neg Hx    • Dislocations Neg Hx    • Learning disabilities Neg Hx    • Neurological problems Neg Hx    • Osteoporosis Neg Hx    • Rheumatologic disease Neg Hx    • Scoliosis Neg Hx    • Vascular Disease Neg Hx      Social History     Tobacco Use   • Smoking status: Former     Current packs/day: 0.00     Average packs/day: 0.5 packs/day for 25.3 years (12.6 ttl pk-yrs)     Types: Cigarettes     Start date: 9/23/1984     Quit date: 2010     Years since quitting: 15.1   • Smokeless tobacco: Never   Vaping Use   • Vaping status: Never Used   Substance and Sexual Activity   • Alcohol use: Yes     Comment: occ   • Drug use: No   • Sexual activity: Yes     Partners: Male     Current Outpatient Medications on File Prior to Visit   Medication Sig   • albuterol (Proventil HFA) 90 mcg/act inhaler Inhale 2 puffs every 6 (six) hours as needed for wheezing   • albuterol (PROVENTIL HFA,VENTOLIN HFA) 90 mcg/act inhaler INHALE 1 PUFF EVERY 6 (SIX) HOURS AS NEEDED FOR WHEEZING   • Biotin 10 MG TABS Take by mouth   • cholecalciferol (VITAMIN D3) 1,000 units tablet Take 1,000 Units by mouth daily 5000 unit daily    • cyanocobalamin (VITAMIN B-12) 50 MCG  "tablet Take 50 mcg by mouth daily   • folic acid (FOLVITE) 1 mg tablet Take 3 tablets (3 mg total) by mouth daily   • hydroxychloroquine (PLAQUENIL) 200 mg tablet Take 1 tablet (200 mg total) by mouth 2 (two) times a day with meals   • methotrexate 2.5 MG tablet TAKE 8 TABS ONCE WEEKLY.   • Omega-3 Fatty Acids (FISH OIL) 1,000 mg Take 1,000 mg by mouth daily   • oxymetazoline (Vicks Sinex 12 Hour Decongest) 0.05 % nasal spray 2 sprays by Each Nare route 2 (two) times a day   • diazepam (VALIUM) 5 mg tablet  (Patient not taking: Reported on 1/22/2025)   • methylPREDNISolone 4 MG tablet therapy pack Use as directed on package (Patient not taking: Reported on 3/7/2025)   • promethazine-dextromethorphan (PHENERGAN-DM) 6.25-15 mg/5 mL oral syrup Take 5 mL by mouth 4 (four) times a day as needed for cough (Patient not taking: Reported on 3/7/2025)     Allergies   Allergen Reactions   • Azithromycin GI Intolerance     Immunization History   Administered Date(s) Administered   • COVID-19 PFIZER VACCINE 0.3 ML IM 05/05/2021, 05/26/2021   • INFLUENZA 10/12/2011, 01/05/2016   • Rho (D) Immune Globulin 12/07/2011   • Tdap 02/23/2012, 04/01/2016   • Tuberculin Skin Test-PPD Intradermal 12/21/2016, 09/04/2018, 09/18/2018, 07/23/2024     Objective   BP 90/58 (BP Location: Left arm, Patient Position: Sitting, Cuff Size: Standard)   Pulse 70   Temp 98 °F (36.7 °C) (Tympanic)   Resp 16   Ht 5' 7\" (1.702 m)   Wt 72.6 kg (160 lb)   LMP 02/21/2025   SpO2 99%   BMI 25.06 kg/m²     Physical Exam  Constitutional:       Appearance: She is well-developed.   HENT:      Head: Normocephalic and atraumatic.   Pulmonary:      Effort: Pulmonary effort is normal.   Musculoskeletal:         General: Normal range of motion.      Comments: Patient complains of tenderness over all DIP and PIP joints of both hands   Skin:     Comments: 2 bruises 1 on each thigh and healing stages yellow to green   Neurological:      Mental Status: She is alert " and oriented to person, place, and time.   Psychiatric:         Behavior: Behavior normal.         Thought Content: Thought content normal.         Judgment: Judgment normal.

## 2025-03-10 ENCOUNTER — APPOINTMENT (OUTPATIENT)
Dept: RADIOLOGY | Facility: CLINIC | Age: 50
End: 2025-03-10
Payer: COMMERCIAL

## 2025-03-10 DIAGNOSIS — M79.642 LEFT HAND PAIN: ICD-10-CM

## 2025-03-10 PROCEDURE — 73130 X-RAY EXAM OF HAND: CPT

## 2025-03-11 LAB
25(OH)D3+25(OH)D2 SERPL-MCNC: 85.7 NG/ML (ref 30–100)
BUN SERPL-MCNC: 12 MG/DL (ref 6–24)
BUN/CREAT SERPL: 11 (ref 9–23)
CALCIUM SERPL-MCNC: 9.5 MG/DL (ref 8.7–10.2)
CHLORIDE SERPL-SCNC: 104 MMOL/L (ref 96–106)
CO2 SERPL-SCNC: 26 MMOL/L (ref 20–29)
CREAT SERPL-MCNC: 1.14 MG/DL (ref 0.57–1)
EGFR: 59 ML/MIN/1.73
FERRITIN SERPL-MCNC: 54 NG/ML (ref 15–150)
GLUCOSE SERPL-MCNC: 69 MG/DL (ref 70–99)
IRON SATN MFR SERPL: 24 % (ref 15–55)
IRON SERPL-MCNC: 74 UG/DL (ref 27–159)
POTASSIUM SERPL-SCNC: 4.3 MMOL/L (ref 3.5–5.2)
SODIUM SERPL-SCNC: 142 MMOL/L (ref 134–144)
TIBC SERPL-MCNC: 308 UG/DL (ref 250–450)
UIBC SERPL-MCNC: 234 UG/DL (ref 131–425)
VIT B12 SERPL-MCNC: 1134 PG/ML (ref 232–1245)

## 2025-03-12 ENCOUNTER — RESULTS FOLLOW-UP (OUTPATIENT)
Dept: FAMILY MEDICINE CLINIC | Facility: CLINIC | Age: 50
End: 2025-03-12

## 2025-03-12 DIAGNOSIS — N17.9 AKI (ACUTE KIDNEY INJURY) (HCC): Primary | ICD-10-CM

## 2025-03-12 NOTE — TELEPHONE ENCOUNTER
----- Message from Lisbeth Zamudio MD sent at 3/12/2025 11:12 AM EDT -----  Please advise the patient that her iron levels and vitamin levels are all within normal limits.  Her glucose though was showing hypoglycemic which can cause patients to have fatigue.  I strongly recommend her to make sure she is eating at least 3 scheduled meals a day to avoid this from dropping throughout the day.  I am not sure why her kidney function has decreased and I would like to repeat this in 1 week just to compare.  For some reason her kidneys just look a little drier than normal.  Please be sure she is drinking at least 64 ounces of fluid    X-ray ironically did not show any signs of osteoarthritis

## 2025-03-12 NOTE — TELEPHONE ENCOUNTER
Pt returned missed call stating she saw the results and message from PCP. NFA required at this time.

## 2025-03-17 ENCOUNTER — APPOINTMENT (OUTPATIENT)
Dept: LAB | Facility: CLINIC | Age: 50
End: 2025-03-17
Payer: COMMERCIAL

## 2025-03-18 ENCOUNTER — RESULTS FOLLOW-UP (OUTPATIENT)
Dept: FAMILY MEDICINE CLINIC | Facility: CLINIC | Age: 50
End: 2025-03-18

## 2025-03-23 DIAGNOSIS — M32.9 SYSTEMIC LUPUS ERYTHEMATOSUS, UNSPECIFIED SLE TYPE, UNSPECIFIED ORGAN INVOLVEMENT STATUS (HCC): ICD-10-CM

## 2025-03-24 RX ORDER — METHOTREXATE 2.5 MG/1
TABLET ORAL
Qty: 96 TABLET | Refills: 0 | Status: SHIPPED | OUTPATIENT
Start: 2025-03-24

## 2025-05-20 ENCOUNTER — OFFICE VISIT (OUTPATIENT)
Dept: FAMILY MEDICINE CLINIC | Facility: CLINIC | Age: 50
End: 2025-05-20
Payer: COMMERCIAL

## 2025-05-20 VITALS
HEART RATE: 68 BPM | TEMPERATURE: 97.6 F | RESPIRATION RATE: 18 BRPM | HEIGHT: 67 IN | WEIGHT: 162.6 LBS | SYSTOLIC BLOOD PRESSURE: 102 MMHG | DIASTOLIC BLOOD PRESSURE: 70 MMHG | OXYGEN SATURATION: 99 % | BODY MASS INDEX: 25.52 KG/M2

## 2025-05-20 DIAGNOSIS — J01.40 ACUTE NON-RECURRENT PANSINUSITIS: Primary | ICD-10-CM

## 2025-05-20 PROCEDURE — 99214 OFFICE O/P EST MOD 30 MIN: CPT | Performed by: NURSE PRACTITIONER

## 2025-05-20 NOTE — PROGRESS NOTES
"Name: Belén Piper      : 1975      MRN: [id number]  Encounter Provider: NIDIA Ag  Encounter Date: 2025   Encounter department: [facility]  :  Assessment & Plan  Acute non-recurrent pansinusitis  Augmentin 875mg twice daily with food for one week  Fluids. Rest. Nasal saline rinses  Symptom management for supportive care such as decongestants, tylenol/motrin as needed for fever or discomfort.  Use a cool mist humidifier at bedtime.    Flonase as directed. Finish antibiotics as prescribed.   Warm compresses to facilitate nasal drainage.   Orders:    amoxicillin-clavulanate (AUGMENTIN) 875-125 mg per tablet; Take 1 tablet by mouth every 12 (twelve) hours for 7 days           History of Present Illness   Here for possible sinus infection   Symptoms started  Severe headache since Saturday 3-4 days.   Is immunocompromised, has lupus.   Pain across forehead.   Nasal congestion. No fever. Left ear pain.   Has been using mucinex, sinus meds,   Slight dry cough.   Congestion started before headache got really bad  Has flonase at home but has not used it.     Sinusitis  Associated symptoms include congestion, coughing, ear pain, headaches and sinus pressure. Pertinent negatives include no shortness of breath or sore throat.   Headache    Review of Systems   Constitutional:  Negative for fever.   HENT:  Positive for congestion, ear pain, sinus pressure and sinus pain. Negative for sore throat.    Respiratory:  Positive for cough. Negative for chest tightness, shortness of breath and wheezing.    Gastrointestinal:  Negative for nausea and vomiting.   Allergic/Immunologic: Positive for immunocompromised state.   Neurological:  Positive for headaches.       Objective   /70   Pulse 68   Temp 97.6 °F (36.4 °C)   Resp 18   Ht 5' 7\" (1.702 m)   Wt 73.8 kg (162 lb 9.6 oz)   SpO2 99%   BMI 25.47 kg/m²      Physical Exam  Vitals reviewed.   Constitutional:       " Appearance: She is ill-appearing.   HENT:      Right Ear: Tympanic membrane and ear canal normal.      Left Ear: Tympanic membrane and ear canal normal.      Nose: Congestion present.      Comments: Turbinates inflamed  Pain with palpation over maxillary and frontal sinus area     Mouth/Throat:      Mouth: Mucous membranes are moist.      Pharynx: Oropharynx is clear. No oropharyngeal exudate or posterior oropharyngeal erythema.     Cardiovascular:      Rate and Rhythm: Normal rate.   Pulmonary:      Effort: Pulmonary effort is normal. No respiratory distress.      Breath sounds: Normal breath sounds. No wheezing.     Skin:     General: Skin is warm and dry.      Coloration: Skin is not jaundiced or pale.     Neurological:      General: No focal deficit present.      Mental Status: She is alert and oriented to person, place, and time.      Cranial Nerves: No cranial nerve deficit.      Sensory: No sensory deficit.     Psychiatric:         Mood and Affect: Mood normal.         Behavior: Behavior normal.         Thought Content: Thought content normal.         Judgment: Judgment normal.

## 2025-05-20 NOTE — PATIENT INSTRUCTIONS
Augmentin 875mg twice daily with food for one week  Fluids. Rest. Nasal saline rinses  Symptom management for supportive care such as decongestants, tylenol/motrin as needed for fever or discomfort.  Use a cool mist humidifier at bedtime.    Flonase as directed. Finish antibiotics as prescribed.   Warm compresses to facilitate nasal drainage.

## 2025-05-27 ENCOUNTER — HOSPITAL ENCOUNTER (OUTPATIENT)
Dept: RADIOLOGY | Facility: HOSPITAL | Age: 50
Discharge: HOME | End: 2025-05-27
Payer: COMMERCIAL

## 2025-05-27 DIAGNOSIS — Z12.31 ENCOUNTER FOR SCREENING MAMMOGRAM FOR MALIGNANT NEOPLASM OF BREAST: ICD-10-CM

## 2025-05-27 PROCEDURE — 77067 SCR MAMMO BI INCL CAD: CPT

## 2025-05-27 PROCEDURE — 77063 BREAST TOMOSYNTHESIS BI: CPT

## 2025-06-16 DIAGNOSIS — M32.9 SYSTEMIC LUPUS ERYTHEMATOSUS, UNSPECIFIED SLE TYPE, UNSPECIFIED ORGAN INVOLVEMENT STATUS (HCC): ICD-10-CM

## 2025-06-16 RX ORDER — HYDROXYCHLOROQUINE SULFATE 200 MG/1
TABLET, FILM COATED ORAL
Qty: 180 TABLET | Refills: 1 | Status: SHIPPED | OUTPATIENT
Start: 2025-06-16 | End: 2026-06-16

## 2025-06-16 RX ORDER — METHOTREXATE 2.5 MG/1
TABLET ORAL
Qty: 96 TABLET | Refills: 1 | Status: SHIPPED | OUTPATIENT
Start: 2025-06-16

## 2025-06-24 ENCOUNTER — OFFICE VISIT (OUTPATIENT)
Age: 50
End: 2025-06-24
Payer: COMMERCIAL

## 2025-06-24 VITALS — BODY MASS INDEX: 25.43 KG/M2 | HEIGHT: 67 IN | WEIGHT: 162 LBS | RESPIRATION RATE: 18 BRPM

## 2025-06-24 DIAGNOSIS — L60.0 INGROWN TOENAIL: ICD-10-CM

## 2025-06-24 DIAGNOSIS — L03.031 PARONYCHIA OF TOENAIL OF RIGHT FOOT: Primary | ICD-10-CM

## 2025-06-24 DIAGNOSIS — M79.671 RIGHT FOOT PAIN: ICD-10-CM

## 2025-06-24 PROCEDURE — 99202 OFFICE O/P NEW SF 15 MIN: CPT | Performed by: PODIATRIST

## 2025-06-24 NOTE — PROGRESS NOTES
Assessment/Plan: Chronic ingrown toenail right hallux with secondary paronychia tibial nail groove.  Pain.    Plan.  Chart reviewed.  PCP notes reviewed.  Patient examined.  At this time patient advised on nail problem.  We recommend nail matrixectomy.  This will be partial of the right hallux.  Patient will schedule.  Watch for signs of infection.  Wound care instruction given.  Return for procedure.         Diagnoses and all orders for this visit:    Paronychia of toenail of right foot    Ingrown toenail    Right foot pain          Subjective: There is chronic pain in the right big toe.  She gets chronic ingrown toenail.    Allergies   Allergen Reactions    Azithromycin GI Intolerance       Current Medications[1]    Problem List[2]       Patient ID: Belén Piper is a 49 y.o. female.    HPI    The following portions of the patient's history were reviewed and updated as appropriate:     family history includes Arthritis in her family; Breast cancer (age of onset: 32) in her cousin; Cancer in her brother and mother; Colon cancer in her maternal uncle; Heart disease in her sister; Hypertension in her father; Liver disease in her mother; Lung cancer in her family; No Known Problems in her daughter, maternal aunt, maternal grandfather, maternal grandmother, paternal aunt, paternal grandfather, paternal grandmother, paternal uncle, son, and son; Uterine cancer in her family.      reports that she quit smoking about 15 years ago. Her smoking use included cigarettes. She started smoking about 40 years ago. She has a 12.6 pack-year smoking history. She has been exposed to tobacco smoke. She has never used smokeless tobacco. She reports current alcohol use. She reports that she does not use drugs.    Vitals:    06/24/25 1301   Resp: 18       Review of Systems      Objective:  Patient's shoes and socks removed.   Foot ExamPhysical Exam         General  General Appearance: appears stated age and healthy   Orientation: alert  and oriented to person, place, and time   Affect: appropriate   Gait: antalgic         Right Foot/Ankle      Inspection and Palpation  Ecchymosis: none  Tenderness: plantar fascia   Swelling: plantar fascia   Arch: pes planus  Hammertoes: fifth toe  Hallux valgus: no  Hallux limitus: yes  Skin Exam: skin intact;      Neurovascular  Dorsalis pedis: 3+  Posterior tibial: 3+  Superficial peroneal nerve sensation: diminished  Deep peroneal nerve sensation: diminished  Achilles reflex: 1+     Muscle Strength  Ankle dorsiflexion: 4+     Tests  PT Tinel's sign: negative    Too many toes: positive      Comments  Pain with palpation heel.  4/5 L5 testing bilateral.     Left Foot/Ankle       Inspection and Palpation  Ecchymosis: none  Tenderness: plantar fascia   Swelling: none   Arch: pes planus  Hammertoes: fifth toe  Hallux valgus: no  Hallux limitus: yes  Skin Exam: skin intact;      Neurovascular  Dorsalis pedis: 3+  Posterior tibial: 3+  Superficial peroneal nerve sensation: diminished  Deep peroneal nerve sensation: diminished  Achilles reflex: 1+     Muscle Strength  Ankle dorsiflexion: 4+     Tests  Too many toes: positive         Physical Exam   Cardiovascular:   Pulses:       Dorsalis pedis pulses are 3+ on the right side, and 3+ on the left side.        Posterior tibial pulses are 3+ on the right side, and 3+ on the left side.   Neurological:   Reflex Scores:       Achilles reflexes are 1+ on the right side and 1+ on the left side.    Right hallux demonstrates wide incurvated toenail.  Tibial aspect demonstrates ingrown aspect with nail callus.  Positive paronychia.  Negative pus.                               [1]   Current Outpatient Medications:     albuterol (Proventil HFA) 90 mcg/act inhaler, Inhale 2 puffs every 6 (six) hours as needed for wheezing, Disp: 6.7 g, Rfl: 5    albuterol (PROVENTIL HFA,VENTOLIN HFA) 90 mcg/act inhaler, INHALE 1 PUFF EVERY 6 (SIX) HOURS AS NEEDED FOR WHEEZING, Disp: 6.7 g, Rfl: 5     Biotin 10 MG TABS, Take by mouth, Disp: , Rfl:     cholecalciferol (VITAMIN D3) 1,000 units tablet, Take 1,000 Units by mouth in the morning. 5000 unit daily., Disp: , Rfl:     cyanocobalamin (VITAMIN B-12) 50 MCG tablet, Take 50 mcg by mouth in the morning., Disp: , Rfl:     folic acid (FOLVITE) 1 mg tablet, Take 3 tablets (3 mg total) by mouth daily, Disp: 270 tablet, Rfl: 3    hydroxychloroquine (PLAQUENIL) 200 mg tablet, TAKE 1 TABLET (200 MG TOTAL) BY MOUTH TWO (TWO) TIMES A DAY WITH MEALS, Disp: 180 tablet, Rfl: 1    methotrexate 2.5 MG tablet, TAKE EIGHT TABS ONCE WEEKLY., Disp: 96 tablet, Rfl: 1    Omega-3 Fatty Acids (FISH OIL) 1,000 mg, Take 1,000 mg by mouth in the morning., Disp: , Rfl:     SUMAtriptan (IMITREX) 100 mg tablet, Take 1 tablet (100 mg total) by mouth once as needed for migraine for up to 9 doses, Disp: 9 tablet, Rfl: 2    diazepam (VALIUM) 5 mg tablet, , Disp: , Rfl:     oxymetazoline (Vicks Sinex 12 Hour Decongest) 0.05 % nasal spray, 2 sprays by Each Nare route 2 (two) times a day (Patient not taking: Reported on 6/24/2025), Disp: , Rfl:   [2]   Patient Active Problem List  Diagnosis    Plantar fasciitis    Pain in both feet    Congenital pes planus of right foot    Congenital pes planus of left foot    Radiculopathy of lumbar region    Difficulty walking    Tarsal tunnel syndrome of right side    Closed nondisplaced fracture of body of right calcaneus    Systemic lupus erythematosus (HCC)    Right foot pain    Common migraine without aura    Generalized anxiety disorder    Vertigo    Imbalance    Neck pain    Bilateral tinnitus    PONV (postoperative nausea and vomiting)    S/P tubal ligation    Gastroesophageal reflux disease    Sore throat    Yeast infection    Body aches    Long-term use of Plaquenil    Methotrexate, long term, current use    Fibromyalgia    Primary generalized (osteo)arthritis

## 2025-06-26 ENCOUNTER — PROCEDURE VISIT (OUTPATIENT)
Age: 50
End: 2025-06-26
Payer: COMMERCIAL

## 2025-06-26 VITALS — WEIGHT: 162 LBS | HEIGHT: 67 IN | RESPIRATION RATE: 17 BRPM | BODY MASS INDEX: 25.43 KG/M2

## 2025-06-26 DIAGNOSIS — M79.671 RIGHT FOOT PAIN: ICD-10-CM

## 2025-06-26 DIAGNOSIS — L03.031 PARONYCHIA OF TOENAIL OF RIGHT FOOT: ICD-10-CM

## 2025-06-26 DIAGNOSIS — L60.0 INGROWN TOENAIL: Primary | ICD-10-CM

## 2025-06-26 PROCEDURE — 11750 EXCISION NAIL&NAIL MATRIX: CPT | Performed by: PODIATRIST

## 2025-06-26 PROCEDURE — 99213 OFFICE O/P EST LOW 20 MIN: CPT | Performed by: PODIATRIST

## 2025-06-26 NOTE — PROGRESS NOTES
"Nail removal    Date/Time: 6/26/2025 11:45 AM    Performed by: Jesse Costello DPM  Authorized by: Jesse Costello DPM    Patient location:  [facility] Protocol:  procedure performed by consultantConsent: Verbal consent obtained. Written consent not obtained  Risks and benefits: risks, benefits and alternatives were discussed  Consent given by: patient  Time out: Immediately prior to procedure a \"time out\" was called to verify the correct patient, procedure, equipment, support staff and site/side marked as required.  Timeout called at: 6/26/2025 11:38 AM.  Patient understanding: patient states understanding of the procedure being performed  Patient identity confirmed: verbally with patient    Location:     Foot:  R big toe (Tibial aspect)  Pre-procedure details:     Skin preparation:  Betadine    Preparation: Patient was prepped and draped in the usual sterile fashion    Nail Removal:     Nail removed:  Partial    Nail side:  Medial    Nail bed sutured: no    Ingrown nail:     Wedge excision of skin: no      Nail matrix removed or ablated:  Partial (Nail matrixectomy affected by application of phenol.  3, 30 seconds application of phenol applied to nail matrix)  Post-procedure details:     Dressing:  4x4 sterile gauze, antibiotic ointment and gauze roll    Patient tolerance of procedure:  Tolerated well, no immediate complications     Foot Exam          General  General Appearance: appears stated age and healthy   Orientation: alert and oriented to person, place, and time   Affect: appropriate   Gait: antalgic         Right Foot/Ankle      Inspection and Palpation  Ecchymosis: none  Tenderness: plantar fascia   Swelling: plantar fascia   Arch: pes planus  Hammertoes: fifth toe  Hallux valgus: no  Hallux limitus: yes  Skin Exam: skin intact;      Neurovascular  Dorsalis pedis: 3+  Posterior tibial: 3+  Superficial peroneal nerve sensation: diminished  Deep peroneal nerve sensation: diminished  Achilles reflex: " 1+     Muscle Strength  Ankle dorsiflexion: 4+     Tests  PT Tinel's sign: negative    Too many toes: positive      Comments  Pain with palpation heel.  4/5 L5 testing bilateral.     Left Foot/Ankle       Inspection and Palpation  Ecchymosis: none  Tenderness: plantar fascia   Swelling: none   Arch: pes planus  Hammertoes: fifth toe  Hallux valgus: no  Hallux limitus: yes  Skin Exam: skin intact;      Neurovascular  Dorsalis pedis: 3+  Posterior tibial: 3+  Superficial peroneal nerve sensation: diminished  Deep peroneal nerve sensation: diminished  Achilles reflex: 1+     Muscle Strength  Ankle dorsiflexion: 4+     Tests  Too many toes: positive         Physical Exam   Cardiovascular:   Pulses:       Dorsalis pedis pulses are 3+ on the right side, and 3+ on the left side.        Posterior tibial pulses are 3+ on the right side, and 3+ on the left side.   Neurological:   Reflex Scores:       Achilles reflexes are 1+ on the right side and 1+ on the left side.     Right hallux demonstrates wide incurvated toenail.  Tibial aspect demonstrates ingrown aspect with nail callus.  Positive paronychia.  Negative pus.

## 2025-07-11 DIAGNOSIS — G43.009 MIGRAINE WITHOUT AURA AND WITHOUT STATUS MIGRAINOSUS, NOT INTRACTABLE: ICD-10-CM

## 2025-07-11 RX ORDER — SUMATRIPTAN SUCCINATE 100 MG/1
TABLET ORAL
Qty: 9 TABLET | Refills: 1 | Status: SHIPPED | OUTPATIENT
Start: 2025-07-11

## 2025-08-08 LAB
ALBUMIN SERPL-MCNC: 4.6 G/DL (ref 3.9–4.9)
ALP SERPL-CCNC: 50 IU/L (ref 44–121)
ALT SERPL-CCNC: 7 IU/L (ref 0–32)
AST SERPL-CCNC: 22 IU/L (ref 0–40)
BASOPHILS # BLD AUTO: 0.1 X10E3/UL (ref 0–0.2)
BASOPHILS NFR BLD AUTO: 1 %
BILIRUB SERPL-MCNC: 0.4 MG/DL (ref 0–1.2)
BUN SERPL-MCNC: 10 MG/DL (ref 6–24)
BUN/CREAT SERPL: 13 (ref 9–23)
CALCIUM SERPL-MCNC: 9.5 MG/DL (ref 8.7–10.2)
CHLORIDE SERPL-SCNC: 99 MMOL/L (ref 96–106)
CO2 SERPL-SCNC: 23 MMOL/L (ref 20–29)
CREAT SERPL-MCNC: 0.77 MG/DL (ref 0.57–1)
EGFR: 94 ML/MIN/1.73
EOSINOPHIL # BLD AUTO: 0.1 X10E3/UL (ref 0–0.4)
EOSINOPHIL NFR BLD AUTO: 1 %
ERYTHROCYTE [DISTWIDTH] IN BLOOD BY AUTOMATED COUNT: 13.4 % (ref 11.7–15.4)
ERYTHROCYTE [SEDIMENTATION RATE] IN BLOOD BY WESTERGREN METHOD: 3 MM/HR (ref 0–40)
GLOBULIN SER-MCNC: 2.8 G/DL (ref 1.5–4.5)
GLUCOSE SERPL-MCNC: 77 MG/DL (ref 70–99)
HCT VFR BLD AUTO: 40.8 % (ref 34–46.6)
HGB BLD-MCNC: 13.3 G/DL (ref 11.1–15.9)
IMM GRANULOCYTES # BLD: 0 X10E3/UL (ref 0–0.1)
IMM GRANULOCYTES NFR BLD: 0 %
LYMPHOCYTES # BLD AUTO: 1.4 X10E3/UL (ref 0.7–3.1)
LYMPHOCYTES NFR BLD AUTO: 27 %
MCH RBC QN AUTO: 30.2 PG (ref 26.6–33)
MCHC RBC AUTO-ENTMCNC: 32.6 G/DL (ref 31.5–35.7)
MCV RBC AUTO: 93 FL (ref 79–97)
MONOCYTES # BLD AUTO: 0.4 X10E3/UL (ref 0.1–0.9)
MONOCYTES NFR BLD AUTO: 7 %
NEUTROPHILS # BLD AUTO: 3.3 X10E3/UL (ref 1.4–7)
NEUTROPHILS NFR BLD AUTO: 64 %
PLATELET # BLD AUTO: 235 X10E3/UL (ref 150–450)
POTASSIUM SERPL-SCNC: 3.9 MMOL/L (ref 3.5–5.2)
PROT SERPL-MCNC: 7.4 G/DL (ref 6–8.5)
RBC # BLD AUTO: 4.4 X10E6/UL (ref 3.77–5.28)
SODIUM SERPL-SCNC: 138 MMOL/L (ref 134–144)
WBC # BLD AUTO: 5.2 X10E3/UL (ref 3.4–10.8)

## 2025-08-09 LAB
BACTERIA URNS QL MICRO: ABNORMAL
C3 SERPL-MCNC: 112 MG/DL (ref 82–167)
C4 SERPL-MCNC: 16 MG/DL (ref 12–38)
CASTS URNS QL MICRO: ABNORMAL /LPF
CREAT UR-MCNC: 201.4 MG/DL
CRP SERPL-MCNC: <1 MG/L (ref 0–10)
DSDNA AB SER-ACNC: <1 IU/ML (ref 0–9)
EPI CELLS #/AREA URNS HPF: >10 /HPF (ref 0–10)
MICRO URNS: NORMAL
PROT UR-MCNC: 29.6 MG/DL
PROT/CREAT UR: 147 MG/G CREAT (ref 0–200)
RBC #/AREA URNS HPF: ABNORMAL /HPF (ref 0–2)
WBC #/AREA URNS HPF: ABNORMAL /HPF (ref 0–5)

## (undated) DEVICE — TELFA NON-ADHERENT ABSORBENT DRESSING: Brand: TELFA

## (undated) DEVICE — STANDARD SURGICAL GOWN, L: Brand: CONVERTORS

## (undated) DEVICE — MINI SUCTION TUBE 5300-20-000

## (undated) DEVICE — SCD SEQUENTIAL COMPRESSION COMFORT SLEEVE LARGE KNEE LENGTH: Brand: KENDALL SCD

## (undated) DEVICE — MAXI PAD5.51 X 13.78 IN. (14.0 X 35.0 CM)HEAVYCONTOUREDUNSCENTED: Brand: CURITY

## (undated) DEVICE — LUBRICANT SURGILUBE TUBE 4 OZ  FLIP TOP

## (undated) DEVICE — SPONGE RAYTEX

## (undated) DEVICE — SKIN MARKER DUAL TIP WITH RULER CAP, FLEXIBLE RULER AND LABELS: Brand: DEVON

## (undated) DEVICE — CYSTO TUBING SINGLE IRRIGATION

## (undated) DEVICE — BASIC DOUBLE BASIN 2-LF: Brand: MEDLINE INDUSTRIES, INC.

## (undated) DEVICE — GLOVE SRG BIOGEL 7

## (undated) DEVICE — PREP PAD BNS: Brand: CONVERTORS

## (undated) DEVICE — PERI/GYN PACK: Brand: CONVERTORS

## (undated) DEVICE — LIGHT GLOVE GREEN